# Patient Record
Sex: MALE | Race: WHITE | Employment: OTHER | URBAN - METROPOLITAN AREA
[De-identification: names, ages, dates, MRNs, and addresses within clinical notes are randomized per-mention and may not be internally consistent; named-entity substitution may affect disease eponyms.]

---

## 2017-02-06 ENCOUNTER — OFFICE VISIT (OUTPATIENT)
Dept: PEDIATRICS | Facility: CLINIC | Age: 67
End: 2017-02-06
Payer: COMMERCIAL

## 2017-02-06 VITALS
HEART RATE: 59 BPM | OXYGEN SATURATION: 99 % | DIASTOLIC BLOOD PRESSURE: 64 MMHG | BODY MASS INDEX: 25.09 KG/M2 | TEMPERATURE: 97.5 F | HEIGHT: 71 IN | SYSTOLIC BLOOD PRESSURE: 116 MMHG | WEIGHT: 179.2 LBS

## 2017-02-06 DIAGNOSIS — Z00.00 ROUTINE GENERAL MEDICAL EXAMINATION AT A HEALTH CARE FACILITY: Primary | ICD-10-CM

## 2017-02-06 DIAGNOSIS — I83.93 VARICOSE VEINS OF BOTH LOWER EXTREMITIES: ICD-10-CM

## 2017-02-06 LAB
ANION GAP SERPL CALCULATED.3IONS-SCNC: 9 MMOL/L (ref 3–14)
BUN SERPL-MCNC: 17 MG/DL (ref 7–30)
CALCIUM SERPL-MCNC: 9 MG/DL (ref 8.5–10.1)
CHLORIDE SERPL-SCNC: 105 MMOL/L (ref 94–109)
CHOLEST SERPL-MCNC: 196 MG/DL
CO2 SERPL-SCNC: 26 MMOL/L (ref 20–32)
CREAT SERPL-MCNC: 0.89 MG/DL (ref 0.66–1.25)
GFR SERPL CREATININE-BSD FRML MDRD: 85 ML/MIN/1.7M2
GLUCOSE SERPL-MCNC: 89 MG/DL (ref 70–99)
HCV AB SERPL QL IA: NORMAL
HDLC SERPL-MCNC: 48 MG/DL
LDLC SERPL CALC-MCNC: 136 MG/DL
NONHDLC SERPL-MCNC: 148 MG/DL
POTASSIUM SERPL-SCNC: 4.2 MMOL/L (ref 3.4–5.3)
PSA SERPL-ACNC: 0.89 UG/L (ref 0–4)
SODIUM SERPL-SCNC: 140 MMOL/L (ref 133–144)
TRIGL SERPL-MCNC: 61 MG/DL

## 2017-02-06 PROCEDURE — G0103 PSA SCREENING: HCPCS | Performed by: INTERNAL MEDICINE

## 2017-02-06 PROCEDURE — 36415 COLL VENOUS BLD VENIPUNCTURE: CPT | Performed by: INTERNAL MEDICINE

## 2017-02-06 PROCEDURE — 86803 HEPATITIS C AB TEST: CPT | Performed by: INTERNAL MEDICINE

## 2017-02-06 PROCEDURE — 99397 PER PM REEVAL EST PAT 65+ YR: CPT | Performed by: INTERNAL MEDICINE

## 2017-02-06 PROCEDURE — 80061 LIPID PANEL: CPT | Performed by: INTERNAL MEDICINE

## 2017-02-06 PROCEDURE — 80048 BASIC METABOLIC PNL TOTAL CA: CPT | Performed by: INTERNAL MEDICINE

## 2017-02-06 NOTE — PATIENT INSTRUCTIONS
Preventive Health Recommendations:     Yearly exam:             See your health care provider every year in order to  o   Review health changes.   o   Discuss preventive care.      Every year, have a diabetes test (fasting glucose).    Every 1-2 years, have a cholesterol test.     Have a colonoscopy again in 2021.  Shots:     Get a flu shot each year.     Get a tetanus shot every 10 years.   Nutrition:     Eat at least 5 servings of fruits and vegetables each day.     Eat whole-grain bread, whole-wheat pasta and brown rice instead of white grains and rice.     Get adequate Calcium and Vitamin D.   Lifestyle    Exercise for at least 150 minutes a week (30 minutes a day, 5 days a week). This will help you control your weight and prevent disease.     Limit alcohol to one drink per day.     No smoking.     Wear sunscreen to prevent skin cancer.     See your dentist every six months for an exam and cleaning.     See your eye doctor every 1 to 2 years to screen for conditions such as glaucoma, macular degeneration and cataracts.

## 2017-02-06 NOTE — PROGRESS NOTES
SUBJECTIVE:                                                            Keith Kevin is a 66 year old male who presents for Preventive Visit.  Are you in the first 12 months of your Medicare Part B coverage?  No    Healthy Habits:    Do you get at least three servings of calcium containing foods daily (dairy, green leafy vegetables, etc.)? yes    Amount of exercise or daily activities, outside of work: 76 day(s) per week    Problems taking medications regularly No    Medication side effects: No    Have you had an eye exam in the past two years? yes    Do you see a dentist twice per year? yes    Do you have sleep apnea, excessive snoring or daytime drowsiness?no    COGNITIVE SCREEN  1) Repeat 3 items (Banana, Sunrise, Chair)    2) Clock draw: NORMAL  3) 3 item recall: Recalls 3 objects  Results: NORMAL clock, 3 items recalled: COGNITIVE IMPAIRMENT LESS LIKELY    Mini-CogTM Copyright [employer]. Licensed by the author for use in [facility]; reprinted with permission (kelton@[facility]). All rights reserved.      Hx of performance anxiety. Not much of an issue currently. Has used propranolol in the past prn. Does not need a refill today.    Varicose veins, bilateral LE. Starting to cause sx. Plans to f/u with Dr. Huynh.     All Histories reviewed and updated in [facility] as appropriate.  Social History   Substance Use Topics     Smoking status: Never Smoker      Smokeless tobacco: Never Used     Alcohol Use: 0.0 oz/week     0 Standard drinks or equivalent per week      Comment: 1-2 beers per week       The patient does not drink >3 drinks per day nor >7 drinks per week.    Today's PHQ-2 Score:   PHQ-2 ( 1999 Pfizer) 2/6/2017 2/1/2016   Q1: Little interest or pleasure in doing things 0 0   Q2: Feeling down, depressed or hopeless 0 0   PHQ-2 Score 0 0       Do you feel safe in your environment - Yes    Do you have a Health Care Directive?: Yes: Patient states has Advance Directive and will bring in a copy to  "clinic.    Current providers sharing in care for this patient include:   Patient Care Team:  Abram Villanueva MD as PCP - General      Hearing impairment: No - wears hearing aids & feels they are good    Ability to successfully perform activities of daily living: Yes, no assistance needed     Fall risk:  Fallen 2 or more times in the past year?: No  Any fall with injury in the past year?: No    Home safety:  throw rugs in the hallway      The following health maintenance items are reviewed in Epic and correct as of today:  Health Maintenance   Topic Date Due     HEPATITIS C SCREENING  07/21/1968     INFLUENZA VACCINE (SYSTEM ASSIGNED)  09/01/2016     FALL RISK ASSESSMENT  02/01/2017     ADVANCE DIRECTIVE PLANNING Q5 YRS (NO INBASKET)  02/17/2017     TETANUS Q10 YR  12/03/2017     LIPID MONITORING Q5 YEARS (NO INBASKET)  02/01/2021     COLONOSCOPY Q10 YR INBASKET MESSAGE  04/18/2021     PNEUMOCOCCAL  Completed     AORTIC ANEURYSM SCREENING (SYSTEM ASSIGNED)  Completed             ROS:  Constitutional, HEENT, cardiovascular, pulmonary, gi and gu systems are negative, except as otherwise noted.    Problem list, Medication list, Allergies, and Medical/Social/Surgical histories reviewed in [facility] and updated as appropriate.  Labs reviewed in EPIC  OBJECTIVE:                                                            /64 mmHg  Pulse 59  Temp(Src) 97.5  F (36.4  C) (Tympanic)  Ht 5' 10.5\" (1.791 m)  Wt 179 lb 3.2 oz (81.285 kg)  BMI 25.34 kg/m2  SpO2 99% Estimated body mass index is 25.34 kg/(m^2) as calculated from the following:    Height as of this encounter: 5' 10.5\" (1.791 m).    Weight as of this encounter: 179 lb 3.2 oz (81.285 kg).  EXAM:   GENERAL: healthy, alert and no distress  EYES: Eyes grossly normal to inspection, PERRL and conjunctivae and sclerae normal  HENT: ear canals and TM's normal, nose and mouth without ulcers or lesions  NECK: no adenopathy, no asymmetry, masses, or scars and thyroid " "normal to palpation  RESP: lungs clear to auscultation - no rales, rhonchi or wheezes  CV: regular rate and rhythm, normal S1 S2, no S3 or S4, no murmur, click or rub, no peripheral edema and peripheral pulses strong  ABDOMEN: soft, nontender, no hepatosplenomegaly, no masses and bowel sounds normal  MS: no gross musculoskeletal defects noted, no edema. Varicose veins bilateral LE.   SKIN: no suspicious lesions or rashes  NEURO: Normal strength and tone, mentation intact and speech normal  PSYCH: mentation appears normal, affect normal/bright    ASSESSMENT / PLAN:                                                                ICD-10-CM    1. Routine general medical examination at a health care facility Z00.00 Lipid Profile (Chol, Trig, HDL, LDL calc)     Basic metabolic panel     PSA, screen     Hepatitis C Screen Reflex to HCV RNA Quant and Genotype   2. Varicose veins of both lower extremities I83.93        End of Life Planning:  Patient currently has an advanced directive: Yes.  Practitioner is supportive of decision.    COUNSELING:  Reviewed preventive health counseling, as reflected in patient instructions        Estimated body mass index is 25.34 kg/(m^2) as calculated from the following:    Height as of this encounter: 5' 10.5\" (1.791 m).    Weight as of this encounter: 179 lb 3.2 oz (81.285 kg).     reports that he has never smoked. He has never used smokeless tobacco.      Appropriate preventive services were discussed with this patient, including applicable screening as appropriate for cardiovascular disease, diabetes, osteopenia/osteoporosis, and glaucoma.  As appropriate for age/gender, discussed screening for colorectal cancer, prostate cancer. Checklist reviewing preventive services available has been given to the patient.    Reviewed patients plan of care and provided an AVS. The Basic Care Plan (routine screening as documented in Health Maintenance) for Keith meets the Care Plan requirement. This " Care Plan has been established and reviewed with the Patient.    Counseling Resources:  ATP IV Guidelines  Pooled Cohorts Equation Calculator  Breast Cancer Risk Calculator  FRAX Risk Assessment  ICSI Preventive Guidelines  Dietary Guidelines for Americans, 2010  USDA's MyPlate  ASA Prophylaxis  Lung CA Screening    Abram Villanueva MD  [facility]

## 2017-02-06 NOTE — MR AVS SNAPSHOT
After Visit Summary   2/6/2017    Keith Kevin    MRN: [id number]           Patient Information     Date Of Birth          1950        Visit Information        Provider Department      2/6/2017 8:10 AM Abram Villanueva MD [facility]        Today's Diagnoses     Routine general medical examination at a health care facility    -  1     Varicose veins of both lower extremities         Acute reaction to stress           Care Instructions      Preventive Health Recommendations:     Yearly exam:             See your health care provider every year in order to  o   Review health changes.   o   Discuss preventive care.      Every year, have a diabetes test (fasting glucose).    Every 1-2 years, have a cholesterol test.     Have a colonoscopy again in 2021.  Shots:     Get a flu shot each year.     Get a tetanus shot every 10 years.   Nutrition:     Eat at least 5 servings of fruits and vegetables each day.     Eat whole-grain bread, whole-wheat pasta and brown rice instead of white grains and rice.     Get adequate Calcium and Vitamin D.   Lifestyle    Exercise for at least 150 minutes a week (30 minutes a day, 5 days a week). This will help you control your weight and prevent disease.     Limit alcohol to one drink per day.     No smoking.     Wear sunscreen to prevent skin cancer.     See your dentist every six months for an exam and cleaning.     See your eye doctor every 1 to 2 years to screen for conditions such as glaucoma, macular degeneration and cataracts.        Follow-ups after your visit        Who to contact     If you have questions or need follow up information about today's clinic visit or your schedule please contact [facility] directly at [phone].  Normal or non-critical lab and imaging results will be communicated to you by MyChart, letter or phone within 4 business days after the clinic has received the results. If you do not hear from us within 7 days,  "please contact the clinic through [employer] or phone. If you have a critical or abnormal lab result, we will notify you by phone as soon as possible.  Submit refill requests through [employer] or call your pharmacy and they will forward the refill request to us. Please allow 3 business days for your refill to be completed.          Additional Information About Your Visit        [employer] Information     [employer] gives you secure access to your electronic health record. If you see a primary care provider, you can also send messages to your care team and make appointments. If you have questions, please call your primary care clinic.  If you do not have a primary care provider, please call [phone] and they will assist you.        Care EveryWhere ID     This is your Care EveryWhere ID. This could be used by other organizations to access your [place] medical records  [id number]        Your Vitals Were     Pulse Temperature Height BMI (Body Mass Index) Pulse Oximetry       59 97.5  F (36.4  C) (Tympanic) 5' 10.5\" (1.791 m) 25.34 kg/m2 99%        Blood Pressure from Last 3 Encounters:   02/06/17 116/64   02/01/16 110/60   01/16/15 106/64    Weight from Last 3 Encounters:   02/06/17 179 lb 3.2 oz (81.285 kg)   02/01/16 180 lb (81.647 kg)   01/16/15 175 lb 12.8 oz (79.742 kg)              We Performed the Following     Basic metabolic panel     Lipid Profile (Chol, Trig, HDL, LDL calc)     PSA, screen        Primary Care Provider Office Phone # Fax #    Abram Villanueva [phone]       [facility] [address] [place] [address] [address] [place] [address]        Thank you!     Thank you for choosing [facility]  for your care. Our goal is always to provide you with excellent care. Hearing back from our patients is one way we can continue to improve our services. Please take a few minutes to complete the written survey that you may receive in the mail after your visit with us. Thank you!           "   Your Updated Medication List - Protect others around you: Learn how to safely use, store and throw away your medicines at www.disposemymeds.org.          This list is accurate as of: 2/6/17  8:43 AM.  Always use your most recent med list.                   Brand Name Dispense Instructions for use    aspirin 325 MG EC tablet      1 tab po QD (Once per day)       BL CALCIUM-MAGNESIUM-ZINC 333-133-5 MG Tabs   Generic drug:  Calcium-Magnesium-Zinc      Take  by mouth.       calcium carbonate 500 MG chewable tablet    TUMS    100 tablet    Take 1 tablet by mouth daily.       Fish Oil 1200 MG Caps      None Entered       glucosamine 500 MG Caps      1500 QD       hydrocortisone 2.5 % cream     30 g    Apply  topically 2 times daily as needed.       propranolol 10 MG tablet    INDERAL    30 tablet    Take 1 tablet (10 mg) by mouth 3 times daily as needed       vitamin D 1000 UNITS capsule     100 capsule    Take 1 capsule by mouth daily.

## 2017-02-06 NOTE — NURSING NOTE
"Chief Complaint   Patient presents with     Medicare Visit       Initial /64 mmHg  Pulse 59  Temp(Src) 97.5  F (36.4  C) (Tympanic)  Ht 5' 10.5\" (1.791 m)  Wt 179 lb 3.2 oz (81.285 kg)  BMI 25.34 kg/m2  SpO2 99% Estimated body mass index is 25.34 kg/(m^2) as calculated from the following:    Height as of this encounter: 5' 10.5\" (1.791 m).    Weight as of this encounter: 179 lb 3.2 oz (81.285 kg).  Medication Reconciliation: complete   Rachel Mcmahan, MARICEL      "

## 2017-03-06 ENCOUNTER — TRANSFERRED RECORDS (OUTPATIENT)
Dept: HEALTH INFORMATION MANAGEMENT | Facility: CLINIC | Age: 67
End: 2017-03-06

## 2017-03-06 ENCOUNTER — APPOINTMENT (OUTPATIENT)
Dept: VASCULAR SURGERY | Facility: CLINIC | Age: 67
End: 2017-03-06
Payer: COMMERCIAL

## 2017-03-06 PROCEDURE — 99213 OFFICE O/P EST LOW 20 MIN: CPT | Performed by: SURGERY

## 2017-06-14 ENCOUNTER — DOCUMENTATION ONLY (OUTPATIENT)
Dept: OTHER | Facility: CLINIC | Age: 67
End: 2017-06-14

## 2017-06-14 DIAGNOSIS — Z71.89 ACP (ADVANCE CARE PLANNING): Chronic | ICD-10-CM

## 2017-06-24 DIAGNOSIS — F43.0 ACUTE REACTION TO STRESS: ICD-10-CM

## 2017-06-26 NOTE — TELEPHONE ENCOUNTER
propranolol (INDERAL) 10 MG tablet      Last Written Prescription Date: 1/16/2015  Last Fill Quantity: 30, # refills: 11    Last Office Visit with [facility], [facility] or [facility] prescribing provider:  2/6/2017   Future Office Visit:        BP Readings from Last 3 Encounters:   02/06/17 116/64   02/01/16 110/60   01/16/15 106/64

## 2017-06-28 RX ORDER — PROPRANOLOL HYDROCHLORIDE 10 MG/1
TABLET ORAL
Qty: 30 TABLET | Refills: 11 | Status: SHIPPED | OUTPATIENT
Start: 2017-06-28 | End: 2019-02-12

## 2017-06-28 NOTE — TELEPHONE ENCOUNTER
Routing refill request to provider for review/approval because:  A break in medication, last refill 2015. Please sign if ok.  Uses for anxiety prn.     Keisha Abel, GENEVIEVE  Triage Nurse

## 2017-11-08 ENCOUNTER — TRANSFERRED RECORDS (OUTPATIENT)
Dept: HEALTH INFORMATION MANAGEMENT | Facility: CLINIC | Age: 67
End: 2017-11-08

## 2018-02-07 ENCOUNTER — OFFICE VISIT (OUTPATIENT)
Dept: PEDIATRICS | Facility: CLINIC | Age: 68
End: 2018-02-07
Payer: COMMERCIAL

## 2018-02-07 VITALS
WEIGHT: 180.2 LBS | SYSTOLIC BLOOD PRESSURE: 108 MMHG | OXYGEN SATURATION: 97 % | DIASTOLIC BLOOD PRESSURE: 60 MMHG | HEIGHT: 71 IN | TEMPERATURE: 97.6 F | BODY MASS INDEX: 25.23 KG/M2 | HEART RATE: 59 BPM

## 2018-02-07 DIAGNOSIS — Z12.5 SCREENING FOR PROSTATE CANCER: ICD-10-CM

## 2018-02-07 DIAGNOSIS — Z00.00 ROUTINE GENERAL MEDICAL EXAMINATION AT A HEALTH CARE FACILITY: Primary | ICD-10-CM

## 2018-02-07 LAB
CHOLEST SERPL-MCNC: 218 MG/DL
GLUCOSE SERPL-MCNC: 87 MG/DL (ref 70–99)
HDLC SERPL-MCNC: 67 MG/DL
LDLC SERPL CALC-MCNC: 135 MG/DL
NONHDLC SERPL-MCNC: 151 MG/DL
PSA SERPL-ACNC: 0.75 UG/L (ref 0–4)
TRIGL SERPL-MCNC: 80 MG/DL

## 2018-02-07 PROCEDURE — G0103 PSA SCREENING: HCPCS | Performed by: INTERNAL MEDICINE

## 2018-02-07 PROCEDURE — 36415 COLL VENOUS BLD VENIPUNCTURE: CPT | Performed by: INTERNAL MEDICINE

## 2018-02-07 PROCEDURE — 82947 ASSAY GLUCOSE BLOOD QUANT: CPT | Performed by: INTERNAL MEDICINE

## 2018-02-07 PROCEDURE — G0439 PPPS, SUBSEQ VISIT: HCPCS | Performed by: INTERNAL MEDICINE

## 2018-02-07 PROCEDURE — 80061 LIPID PANEL: CPT | Performed by: INTERNAL MEDICINE

## 2018-02-07 ASSESSMENT — ACTIVITIES OF DAILY LIVING (ADL)
CURRENT_FUNCTION: NO ASSISTANCE NEEDED
I_NEED_ASSISTANCE_FOR_THE_FOLLOWING_DAILY_ACTIVITIES:: NO ASSISTANCE IS NEEDED

## 2018-02-07 NOTE — NURSING NOTE
"Chief Complaint   Patient presents with     Physical       Initial /60  Pulse 59  Temp 97.6  F (36.4  C) (Oral)  Ht 5' 10.87\" (1.8 m)  Wt 180 lb 3.2 oz (81.7 kg)  SpO2 97%  BMI 25.23 kg/m2 Estimated body mass index is 25.23 kg/(m^2) as calculated from the following:    Height as of this encounter: 5' 10.87\" (1.8 m).    Weight as of this encounter: 180 lb 3.2 oz (81.7 kg).  Medication Reconciliation: complete   Sarita Castaneda MA// February 7, 2018 9:43 AM          "

## 2018-02-07 NOTE — PROGRESS NOTES
SUBJECTIVE:   Keith Kevin is a 67 year old male who presents for Preventive Visit.    Are you in the first 12 months of your Medicare coverage?  No    Physical   Annual:     Getting at least 3 servings of Calcium per day::  Yes    Bi-annual eye exam::  Yes    Dental care twice a year::  Yes    Sleep apnea or symptoms of sleep apnea::  None    Diet::  Regular (no restrictions)    Frequency of exercise::  6-7 days/week    Duration of exercise::  Greater than 60 minutes    Taking medications regularly::  Yes    Medication side effects::  Not applicable    Additional concerns today::  YES    Ability to successfully perform activities of daily living: no assistance needed  Home Safety:  Lack of grab bars in the bathroom  Hearing Impairment: difficulty following a conversation in a noisy restaurant or crowded room, feel that people are mumbling or not speaking clearly, difficulty following dialogue in the theater, difficult to understand a speaker at a public meeting or [employer] service, need to ask people to speak up or repeat themselves, find that men's voices are easier to understand than woman's, difficulty understanding soft or whispered speech and difficulty understanding speech on the telephone  - Has hearing aids already, they do help in these situations    Fall risk:  Fallen 2 or more times in the past year?: No  Any fall with injury in the past year?: No    COGNITIVE SCREEN  1) Repeat 3 items (Banana, Sunrise, Chair)    2) Clock draw: NORMAL  3) 3 item recall: Recalls 3 objects  Results: 3 items recalled: COGNITIVE IMPAIRMENT LESS LIKELY    Mini-CogTM Copyright [employer]. Licensed by the author for use in [facility]; reprinted with permission ([email or web address]). All rights reserved.      Has propranolol, uses prn. 30 last at least 1 year.    Reviewed and updated as needed this visit by Provider  Tobacco  Allergies  Meds  Problems  Med Hx  Surg Hx  Fam Hx  Soc Hx         Social History  "  Substance Use Topics     Smoking status: Never Smoker     Smokeless tobacco: Never Used     Alcohol use 0.0 oz/week     0 Standard drinks or equivalent per week      Comment: 1-2 beers per week       Alcohol Use 2/7/2018   If you drink alcohol, do you typically have greater than 3 drinks per day OR greater than 7 drinks per week?   No   No flowsheet data found.      Today's PHQ-2 Score:   PHQ-2 ( 1999 Pfizer) 2/7/2018   Q1: Little interest or pleasure in doing things 0   Q2: Feeling down, depressed or hopeless 0   PHQ-2 Score 0   Q1: Little interest or pleasure in doing things Not at all   Q2: Feeling down, depressed or hopeless Not at all   PHQ-2 Score 0       Do you feel safe in your environment - Yes    Do you have a Health Care Directive?: Yes: Advance Directive has been received and scanned.    Current providers sharing in care for this patient include:   Patient Care Team:  Abram Villanueva MD as PCP - General    The following health maintenance items are reviewed in Epic and correct as of today:  Health Maintenance   Topic Date Due     INFLUENZA VACCINE (SYSTEM ASSIGNED)  09/01/2017     TETANUS Q10 YR  12/03/2017     FALL RISK ASSESSMENT  02/06/2018     COLONOSCOPY Q10 YR  04/18/2021     LIPID MONITORING Q5 YEARS  02/06/2022     ADVANCE DIRECTIVE PLANNING Q5 YRS  06/14/2022     PNEUMOCOCCAL  Completed     AORTIC ANEURYSM SCREENING (SYSTEM ASSIGNED)  Completed     HEPATITIS C SCREENING  Completed     Labs reviewed in EPIC      Review of Systems  Constitutional, HEENT, cardiovascular, pulmonary, gi and gu systems are negative, except as otherwise noted.    OBJECTIVE:   /60  Pulse 59  Temp 97.6  F (36.4  C) (Oral)  Ht 5' 10.87\" (1.8 m)  Wt 180 lb 3.2 oz (81.7 kg)  SpO2 97%  BMI 25.23 kg/m2 Estimated body mass index is 25.23 kg/(m^2) as calculated from the following:    Height as of this encounter: 5' 10.87\" (1.8 m).    Weight as of this encounter: 180 lb 3.2 oz (81.7 kg).  Physical Exam  GENERAL: " "healthy, alert and no distress  EYES: Eyes grossly normal to inspection, PERRL and conjunctivae and sclerae normal  HENT: ear canals and TM's normal, nose and mouth without ulcers or lesions  NECK: no adenopathy, no asymmetry, masses, or scars and thyroid normal to palpation  RESP: lungs clear to auscultation - no rales, rhonchi or wheezes  CV: regular rate and rhythm, normal S1 S2, no S3 or S4, no murmur, click or rub, no peripheral edema and peripheral pulses strong  ABDOMEN: soft, nontender, no hepatosplenomegaly, no masses and bowel sounds normal  MS: no gross musculoskeletal defects noted, no edema  SKIN: no suspicious lesions or rashes  NEURO: Normal strength and tone, mentation intact and speech normal  PSYCH: mentation appears normal, affect normal/bright    ASSESSMENT / PLAN:       ICD-10-CM    1. Routine general medical examination at a health care facility Z00.00 Lipid Profile (Chol, Trig, HDL, LDL calc)     Glucose   2. Screening for prostate cancer Z12.5 PSA, screen       End of Life Planning:  Patient currently has an advanced directive: Yes.  Practitioner is supportive of decision.    COUNSELING:  Reviewed preventive health counseling, as reflected in patient instructions    Estimated body mass index is 25.35 kg/(m^2) as calculated from the following:    Height as of 2/6/17: 5' 10.5\" (1.791 m).    Weight as of 2/6/17: 179 lb 3.2 oz (81.3 kg).     reports that he has never smoked. He has never used smokeless tobacco.      Appropriate preventive services were discussed with this patient, including applicable screening as appropriate for cardiovascular disease, diabetes, osteopenia/osteoporosis, and glaucoma.  As appropriate for age/gender, discussed screening for colorectal cancer, prostate cancer, breast cancer, and cervical cancer. Checklist reviewing preventive services available has been given to the patient.    Reviewed patients plan of care and provided an AVS. The Basic Care Plan (routine " screening as documented in Health Maintenance) for Keith meets the Care Plan requirement. This Care Plan has been established and reviewed with the Patient.    Counseling Resources:  ATP IV Guidelines  Pooled Cohorts Equation Calculator  Breast Cancer Risk Calculator  FRAX Risk Assessment  ICSI Preventive Guidelines  Dietary Guidelines for Americans, 2010  USDA's MyPlate  ASA Prophylaxis  Lung CA Screening    Abram Villanueva MD  [facility]

## 2018-02-07 NOTE — PATIENT INSTRUCTIONS
Preventive Health Recommendations:       Yearly exam:             See your health care provider every year in order to  o   Review health changes.   o   Discuss preventive care.      o   Review your medicines .    Every 1-2 years, have a diabetes test (fasting glucose)..    Every 1-2 years, have a cholesterol test.    Have a colonoscopy again in 2021.    Shots:     Get a flu shot each year.     Get a tetanus shot every 10 years.     Talk to your pharmacist about a shingles vaccine.   Nutrition:     Eat at least 5 servings of fruits and vegetables each day.     Eat whole-grain bread, whole-wheat pasta and brown rice instead of white grains and rice.     Get adequate Calcium and Vitamin D.   Lifestyle    Exercise for at least 150 minutes a week (30 minutes a day, 5 days a week). This will help you control your weight and prevent disease.     Limit alcohol to one drink per day.     No smoking.     Wear sunscreen to prevent skin cancer.     See your dentist every six months for an exam and cleaning.     See your eye doctor every 1 to 2 years to screen for conditions such as glaucoma, macular degeneration, cataracts, etc

## 2018-02-07 NOTE — MR AVS SNAPSHOT
After Visit Summary   2/7/2018    Keith eKvin    MRN: [id number]           Patient Information     Date Of Birth          1950        Visit Information        Provider Department      2/7/2018 9:20 AM Abram Villanueva MD [facility]        Today's Diagnoses     Routine general medical examination at a health care facility    -  1    Screening for prostate cancer          Care Instructions      Preventive Health Recommendations:       Yearly exam:             See your health care provider every year in order to  o   Review health changes.   o   Discuss preventive care.      o   Review your medicines .    Every 1-2 years, have a diabetes test (fasting glucose)..    Every 1-2 years, have a cholesterol test.    Have a colonoscopy again in 2021.    Shots:     Get a flu shot each year.     Get a tetanus shot every 10 years.     Talk to your pharmacist about a shingles vaccine.   Nutrition:     Eat at least 5 servings of fruits and vegetables each day.     Eat whole-grain bread, whole-wheat pasta and brown rice instead of white grains and rice.     Get adequate Calcium and Vitamin D.   Lifestyle    Exercise for at least 150 minutes a week (30 minutes a day, 5 days a week). This will help you control your weight and prevent disease.     Limit alcohol to one drink per day.     No smoking.     Wear sunscreen to prevent skin cancer.     See your dentist every six months for an exam and cleaning.     See your eye doctor every 1 to 2 years to screen for conditions such as glaucoma, macular degeneration, cataracts, etc           Follow-ups after your visit        Who to contact     If you have questions or need follow up information about today's clinic visit or your schedule please contact [facility] directly at [phone].  Normal or non-critical lab and imaging results will be communicated to you by MyChart, letter or phone within 4 business days after the clinic has received the  "results. If you do not hear from us within 7 days, please contact the clinic through [employer] or phone. If you have a critical or abnormal lab result, we will notify you by phone as soon as possible.  Submit refill requests through [employer] or call your pharmacy and they will forward the refill request to us. Please allow 3 business days for your refill to be completed.          Additional Information About Your Visit        [employer] Information     [employer] gives you secure access to your electronic health record. If you see a primary care provider, you can also send messages to your care team and make appointments. If you have questions, please call your primary care clinic.  If you do not have a primary care provider, please call [phone] and they will assist you.        Care EveryWhere ID     This is your Care EveryWhere ID. This could be used by other organizations to access your [place] medical records  [id number]        Your Vitals Were     Pulse Temperature Height Pulse Oximetry BMI (Body Mass Index)       59 97.6  F (36.4  C) (Oral) 5' 10.87\" (1.8 m) 97% 25.23 kg/m2        Blood Pressure from Last 3 Encounters:   02/07/18 108/60   02/06/17 116/64   02/01/16 110/60    Weight from Last 3 Encounters:   02/07/18 180 lb 3.2 oz (81.7 kg)   02/06/17 179 lb 3.2 oz (81.3 kg)   02/01/16 180 lb (81.6 kg)              We Performed the Following     Glucose     Lipid Profile (Chol, Trig, HDL, LDL calc)     PSA, screen        Primary Care Provider Office Phone # Fax #    Abram Villanueva [phone] [address] [place] [address]        Equal Access to Services     [facility] AH: Hadii abbey Sanchez, wafaizada luqadaha, qaybta mindaaleliz dominique. So [facility] [phone].    ATENCIÓN: Si habla español, tiene a ochoa disposición servicios gratuitos de asistencia lingüística. Llame al [phone].    We comply with applicable federal civil " rights laws and [place] laws. We do not discriminate on the basis of race, color, national origin, age, disability, sex, sexual orientation, or gender identity.            Thank you!     Thank you for choosing [facility]  for your care. Our goal is always to provide you with excellent care. Hearing back from our patients is one way we can continue to improve our services. Please take a few minutes to complete the written survey that you may receive in the mail after your visit with us. Thank you!             Your Updated Medication List - Protect others around you: Learn how to safely use, store and throw away your medicines at www.disposemymeds.org.          This list is accurate as of 2/7/18 10:02 AM.  Always use your most recent med list.                   Brand Name Dispense Instructions for use Diagnosis    aspirin 325 MG EC tablet      1 tab po QD (Once per day)        BL CALCIUM-MAGNESIUM-ZINC 333-133-5 MG Tabs per tablet   Generic drug:  Calcium-Magnesium-Zinc      Take  by mouth.        calcium carbonate 500 MG chewable tablet    TUMS    100 tablet    Take 1 tablet by mouth daily.        fish Oil 1200 MG capsule      None Entered        glucosamine 500 MG Caps      1500 QD    Routine general medical examination at a health care facility       hydrocortisone 2.5 % cream     30 g    Apply  topically 2 times daily as needed.    Rash and other nonspecific skin eruption       propranolol 10 MG tablet    INDERAL    30 tablet    TAKE ONE TABLET BY MOUTH THREE TIMES A DAY AS NEEDED    Acute reaction to stress       vitamin D 1000 UNITS capsule     100 capsule    Take 1 capsule by mouth daily.

## 2019-02-12 ENCOUNTER — OFFICE VISIT (OUTPATIENT)
Dept: PEDIATRICS | Facility: CLINIC | Age: 69
End: 2019-02-12
Payer: COMMERCIAL

## 2019-02-12 VITALS
TEMPERATURE: 97.3 F | HEIGHT: 71 IN | BODY MASS INDEX: 25.2 KG/M2 | RESPIRATION RATE: 16 BRPM | HEART RATE: 60 BPM | WEIGHT: 180 LBS | DIASTOLIC BLOOD PRESSURE: 54 MMHG | OXYGEN SATURATION: 97 % | SYSTOLIC BLOOD PRESSURE: 112 MMHG

## 2019-02-12 DIAGNOSIS — Z12.5 SCREENING FOR PROSTATE CANCER: ICD-10-CM

## 2019-02-12 DIAGNOSIS — F43.0 ACUTE REACTION TO STRESS: ICD-10-CM

## 2019-02-12 DIAGNOSIS — Z00.00 ROUTINE GENERAL MEDICAL EXAMINATION AT A HEALTH CARE FACILITY: Primary | ICD-10-CM

## 2019-02-12 LAB
CHOLEST SERPL-MCNC: 203 MG/DL
GLUCOSE SERPL-MCNC: 96 MG/DL (ref 70–99)
HDLC SERPL-MCNC: 54 MG/DL
LDLC SERPL CALC-MCNC: 131 MG/DL
NONHDLC SERPL-MCNC: 149 MG/DL
PSA SERPL-ACNC: 0.83 UG/L (ref 0–4)
TRIGL SERPL-MCNC: 88 MG/DL

## 2019-02-12 PROCEDURE — G0103 PSA SCREENING: HCPCS | Performed by: INTERNAL MEDICINE

## 2019-02-12 PROCEDURE — 82947 ASSAY GLUCOSE BLOOD QUANT: CPT | Performed by: INTERNAL MEDICINE

## 2019-02-12 PROCEDURE — 36415 COLL VENOUS BLD VENIPUNCTURE: CPT | Performed by: INTERNAL MEDICINE

## 2019-02-12 PROCEDURE — G0439 PPPS, SUBSEQ VISIT: HCPCS | Performed by: INTERNAL MEDICINE

## 2019-02-12 PROCEDURE — 80061 LIPID PANEL: CPT | Performed by: INTERNAL MEDICINE

## 2019-02-12 RX ORDER — PROPRANOLOL HYDROCHLORIDE 10 MG/1
TABLET ORAL
Qty: 30 TABLET | Refills: 11 | Status: SHIPPED | OUTPATIENT
Start: 2019-02-12 | End: 2020-02-28

## 2019-02-12 ASSESSMENT — ENCOUNTER SYMPTOMS
SORE THROAT: 0
NAUSEA: 0
COUGH: 0
HEADACHES: 0
HEARTBURN: 0
MYALGIAS: 0
ABDOMINAL PAIN: 0
FREQUENCY: 0
SHORTNESS OF BREATH: 0
ARTHRALGIAS: 0
PARESTHESIAS: 0
CHILLS: 0
CONSTIPATION: 0
HEMATURIA: 0
HEMATOCHEZIA: 0
NERVOUS/ANXIOUS: 0
EYE PAIN: 0
FEVER: 0
WEAKNESS: 0
DIARRHEA: 0
PALPITATIONS: 0
JOINT SWELLING: 0
DIZZINESS: 0

## 2019-02-12 ASSESSMENT — ACTIVITIES OF DAILY LIVING (ADL): CURRENT_FUNCTION: NO ASSISTANCE NEEDED

## 2019-02-12 ASSESSMENT — MIFFLIN-ST. JEOR: SCORE: 1599.91

## 2019-02-12 NOTE — PATIENT INSTRUCTIONS
Preventive Health Recommendations:     See your health care provider every year to    Review health changes.     Discuss preventive care.      Review your medicines.      Every 1-2 years, have a diabetes test (fasting glucose).     At least every 5 years, have a cholesterol test.     Have a colonoscopy again in 2021.    Shots:     Get a flu shot each year.     Get a tetanus shot every 10 years.     Talk to your pharmacist about a shingles vaccine.   Nutrition:     Eat at least 5 servings of fruits and vegetables each day.     Eat whole-grain bread, whole-wheat pasta and brown rice instead of white grains and rice.     Get adequate Calcium and Vitamin D.   Lifestyle    Exercise for at least 150 minutes a week (30 minutes a day, 5 days a week). This will help you control your weight and prevent disease.     Limit alcohol to one drink per day.     No smoking.     Wear sunscreen to prevent skin cancer.    See your dentist every six months for an exam and cleaning.    See your eye doctor every 1 to 2 years to screen for conditions such as glaucoma, macular degeneration, cataracts, etc.    Personalized Prevention Plan  You are due for the preventive services outlined below.  Your care team is available to assist you in scheduling these services.  If you have already completed any of these items, please share that information with your care team to update in your medical record.  Health Maintenance Due   Topic Date Due     Zoster (Shingles) Vaccine (1 of 2) 07/21/1999     FALL RISK ASSESSMENT  02/07/2019     Depression Assessment 2 - yearly  02/07/2019

## 2019-04-29 ENCOUNTER — MYC MEDICAL ADVICE (OUTPATIENT)
Dept: PEDIATRICS | Facility: CLINIC | Age: 69
End: 2019-04-29

## 2019-04-29 ENCOUNTER — TELEPHONE (OUTPATIENT)
Dept: PEDIATRICS | Facility: CLINIC | Age: 69
End: 2019-04-29

## 2019-04-29 DIAGNOSIS — M79.644 PAIN OF FINGER OF RIGHT HAND: Primary | ICD-10-CM

## 2019-04-29 NOTE — TELEPHONE ENCOUNTER
LOV was on 2/12/19. No notes in chart regarding R hand pain/injury.     So, called pt back to get details. He has pain in his R middle finger off & on for couple of years. Planted 100 plats in one day couple of years back which started the pain. Lately he is waking up with pain in that middle finger lasting all day long. Denies swelling in joint, redness, warm to touch, weakness or trouble bending finger. Denies any other injury/work comp.     Requesting us to place a referral to [facility] in [place], there is a head therapist who is specialist in working with musicians' hand therapy.    Placed the referral in place. Advised pt to check with insurance for coverage details. Pt agrees.    Caden, RN  Triage Nurse

## 2019-04-29 NOTE — TELEPHONE ENCOUNTER
Reason for Call: Request for an order or referral:    Order or referral being requested: Hand Therapy - right    Date needed: as soon as possible    Has the patient been seen by the PCP for this problem? YES    Additional comments: Please add referral for hand therapy or call to schedule apt if required    Phone number Patient can be reached at:  Cell number on file:    Telephone Information:   Mobile [phone]       Best Time:      Can we leave a detailed message on this number?  YES    Call taken on 4/29/2019 at 1:00 PM by Olive Gonzalez

## 2019-05-01 ENCOUNTER — THERAPY VISIT (OUTPATIENT)
Dept: OCCUPATIONAL THERAPY | Facility: CLINIC | Age: 69
End: 2019-05-01
Payer: COMMERCIAL

## 2019-05-01 DIAGNOSIS — M79.644 PAIN OF FINGER OF RIGHT HAND: ICD-10-CM

## 2019-05-01 PROCEDURE — 97760 ORTHOTIC MGMT&TRAING 1ST ENC: CPT | Mod: GO | Performed by: OCCUPATIONAL THERAPIST

## 2019-05-01 PROCEDURE — 97110 THERAPEUTIC EXERCISES: CPT | Mod: GO | Performed by: OCCUPATIONAL THERAPIST

## 2019-05-01 PROCEDURE — 97165 OT EVAL LOW COMPLEX 30 MIN: CPT | Mod: GO | Performed by: OCCUPATIONAL THERAPIST

## 2019-05-01 NOTE — PROGRESS NOTES
Hand Therapy Initial Evaluation  Current Date:  5/1/2019    Subjective:  Keith Kevin is a 68 year old right hand dominant male.    Diagnosis:R long finger pain  DOI: July 2017 (MD order date 4/29/19)    Patient reports symptoms of pain of the right long finger which occurred due to digging up plants for landscapping. Since onset symptoms are gradually getting worse. Special tests:  none.  Previous treatment: motrin or advil PRN, exercises found online. General health as reported by patient is excellent.  Pertinent medical history includes: varicose veins.  Medical allergies: none.  Surgical history: none.  Medication history: None.    Occupational Profile Information:  Current occupation is retired pharmacist  Prior functional level:  no limitations  Barriers include:none  Mobility: No difficulty  Transportation: drives  Leisure activities/hobbies: plays oboe    Upper Extremity Functional Index Score:  SCORE:   Column Totals: /80: 71   (A lower score indicates greater disability.)    O:  Pain Level (Scale 0-10):   5/1/2019   At Rest 0   With Use 4     Pain Description:  Date 5/1/2019   Location Dorsal MP joint and P1 and radiates to DIP   Pain Quality Achy in morning, throbbing   Frequency intermittent     Pain is worst  in the morning   Exacerbated by  gripping and downward pressure, snapping fingers, a little pain with oboe playing   Relieved by None, comes and goes randomly, heat or cold helps   Progression Gradually worsening     Sensation:  WNL per pt report    Edema:  Circumference (measured in cm)  Long   Date 5/1/2019 5/1/2019   Side R L   P1 6.9 6.4   IP 6.6 6.2   P2 5.9 5.5     AROM of Fingers AROM (PROM): WNL, but pain present with full composite flexion    Special Tests:  Date 5/1/2019    Side R    Tenderness with palpation of MP of long finger 1-2/10 dorsally; non radially, ulnarly or volarly     Tenderness with palpation of P1 on long finger 0/10    Intrinsic tightness +    PROM MP flexion  Significant tightness felt with limitation of ~75% of full motion    Observation of flexion Slight ulnar shift of extensor tendon ulnarly over MP joint with full flexion      STRENGTH: (Measured in pounds, pain scale 0-10/10)    Date 5/1/2019        Trials Left Right Left Right Left Right Left Right Left Right Left Right   1 80 75             2               3               Avg               Pain                 3 Point Pinch  Date 5/1/2019        Trials Left Right Left Right Left Right Left Right Left Right Left Right   1 22 23             2               3               Avg               Pain                 Lateral Pinch  Date 5/1/2019        Trials Left Right Left Right Left Right Left Right Left Right Left Right   1 20 22             2               3               Avg               Pain                 Assessment/Plan:  Patient presents with symptoms consistent with diagnosis of right long finger pain, with conservative intervention.     Patient's limitations or Problem List includes:  Pain, Increased edema and Weakness of the right long finger which interferes with the patient's ability to perform Self Care Tasks (dressing), Recreational Activities and Household Chores as compared to previous level of function.    Rehab Potential:  Excellent - Return to full activity, no limitations    Patient will benefit from skilled Occupational Therapy to increase flexibility and overall strength and decrease pain and edema to return to previous activity level and resume normal daily tasks and to reach their rehab potential.    Barriers to Learning:  No barrier    Communication Issues:  Patient appears to be able to clearly communicate and understand verbal and written communication and follow directions correctly.    Assessment of Occupational Performance:  3-5 Performance Deficits  Identified Performance Deficits: dressing, home establishment and management and leisure activities      Clinical Decision Making  (Complexity): Low complexity    Treatment Explanation:  The following has been discussed with the patient:  RX ordered/plan of care  Anticipated outcomes  Possible risks and side effects    P: Frequency:  1 X week, once daily  Duration:  for 6 weeks    Treatment Plan:  Modalities:  US and Paraffin  Therapeutic Exercise:  AROM, AAROM, PROM, Tendon Gliding, Blocking, Reverse Blocking, Place and Hold, Extensor Tracking, Isotonics and Isometrics  Neuromuscular re-education:  Nerve Gliding, Coordination/Dexterity, Sensory re-education and Desensitization  Manual Techniques:  Coordination/Dexterity, Joint mobilization, Myofascial release and Manual edema mobilization  Orthotic Fabrication:  Static orthosis     Discharge Plan:  Achieve all LTG.  Independent in home treatment program.  Reach maximal therapeutic benefit.    Home Exercise Program:  Relative motion orthosis  Tendon glides  Passive Hook fist    Next Visit:  Check fit of orthosis  MFR  A/PROM

## 2019-05-06 PROBLEM — M79.644 PAIN OF FINGER OF RIGHT HAND: Status: ACTIVE | Noted: 2019-05-06

## 2019-05-13 ENCOUNTER — THERAPY VISIT (OUTPATIENT)
Dept: OCCUPATIONAL THERAPY | Facility: CLINIC | Age: 69
End: 2019-05-13
Payer: COMMERCIAL

## 2019-05-13 DIAGNOSIS — M79.644 PAIN OF FINGER OF RIGHT HAND: ICD-10-CM

## 2019-05-13 PROCEDURE — 97140 MANUAL THERAPY 1/> REGIONS: CPT | Mod: GO | Performed by: OCCUPATIONAL THERAPIST

## 2019-05-13 PROCEDURE — 97110 THERAPEUTIC EXERCISES: CPT | Mod: GO | Performed by: OCCUPATIONAL THERAPIST

## 2019-05-20 ENCOUNTER — THERAPY VISIT (OUTPATIENT)
Dept: OCCUPATIONAL THERAPY | Facility: CLINIC | Age: 69
End: 2019-05-20
Payer: COMMERCIAL

## 2019-05-20 DIAGNOSIS — M79.644 PAIN OF FINGER OF RIGHT HAND: ICD-10-CM

## 2019-05-20 PROCEDURE — 97140 MANUAL THERAPY 1/> REGIONS: CPT | Mod: GO | Performed by: OCCUPATIONAL THERAPIST

## 2019-05-20 PROCEDURE — 97110 THERAPEUTIC EXERCISES: CPT | Mod: GO | Performed by: OCCUPATIONAL THERAPIST

## 2019-05-28 ENCOUNTER — THERAPY VISIT (OUTPATIENT)
Dept: OCCUPATIONAL THERAPY | Facility: CLINIC | Age: 69
End: 2019-05-28
Payer: COMMERCIAL

## 2019-05-28 DIAGNOSIS — M79.644 PAIN OF FINGER OF RIGHT HAND: ICD-10-CM

## 2019-05-28 PROCEDURE — 97110 THERAPEUTIC EXERCISES: CPT | Mod: GO | Performed by: OCCUPATIONAL THERAPIST

## 2019-05-28 PROCEDURE — 97140 MANUAL THERAPY 1/> REGIONS: CPT | Mod: GO | Performed by: OCCUPATIONAL THERAPIST

## 2019-05-28 NOTE — PROGRESS NOTES
Hand Therapy Progress Note  Current Date:  5/28/2019  Reporting Period: 5/1/19 to 5/28/2019    Subjective:  Keith Kevin is a 68 year old right hand dominant male.    Diagnosis:R long finger pain  DOI: July 2017 (MD order date 4/29/19)    S:  Subjective changes as noted by patient: It's really siff and sore in the morning--wearing the brace vs not wearing it doesn't make a difference.  There are times where it gets really sore during the day.  I can tell I have better ROM.    Functional changes noted by patient: able to do most things     Response to previous treatment:  good  Patient has noted adverse reaction to:   None      O:  Pain Level (Scale 0-10):   5/1/2019 5/28/19   At Rest 0 0   With Use 4 4     Pain Description:  Date 5/1/2019   Location Dorsal MP joint and P1 and radiates to DIP   Pain Quality Achy in morning, throbbing   Frequency intermittent     Pain is worst  in the morning   Exacerbated by  gripping and downward pressure, snapping fingers, a little pain with oboe playing   Relieved by None, comes and goes randomly, heat or cold helps   Progression Gradually worsening     Sensation:  WNL per pt report    Edema:  Circumference (measured in cm)  Long   Date 5/1/2019 5/1/2019 5/28/19   Side R L R   P1 6.9 6.4 6.7   IP 6.6 6.2 6.5   P2 5.9 5.5 5.9     AROM of Fingers AROM (PROM): WNL, but pain present with full composite flexion    Special Tests:  Date 5/1/2019 5/28/19   Side R R   Tenderness with palpation of MP of long finger 1-2/10 dorsally; non radially, ulnarly or volarly  1/10 dorsally   Tenderness with palpation of P1 on long finger 0/10 NT   Intrinsic tightness + +, but lessened   PROM MP flexion Significant tightness felt with limitation of ~75% of full motion WNL   Observation of flexion Slight ulnar shift of extensor tendon ulnarly over MP joint with full flexion WNL     STRENGTH: (Measured in pounds, pain scale 0-10/10)    Date 5/1/2019 5/28/19       Trials Left Right Left Right Left  Right Left Right Left Right Left Right   1 80 75 68 61           2   74 71           3               Avg               Pain                 3 Point Pinch  Date 5/1/2019 5/28/19       Trials Left Right Left Right Left Right Left Right Left Right Left Right   1 22 23 22 21           2               3               Avg               Pain                 Lateral Pinch  Date 5/1/2019        Trials Left Right Left Right Left Right Left Right Left Right Left Right   1 20 22             2               3               Avg               Pain                 Assessment:  Response to therapy has been improvement to:  Flexibility:  tendon gliding is improved, improved excursion of involved muscles and less tightness in involved muscles  Strength:   and pinch  Edema:  edema is more mobile  Pain:  frequency is less, intensity of pain is decreased, duration of pain is decreased and less tender over affected area    Overall Assessment:  Patient's symptoms are resolving.  Patient is ready to progress to more complex exercises.  Patient is ready to progress to next level of protocol.  Patient is becoming more independent in home exercise program  Patient would benefit from continued therapy to achieve rehab potential  STG/LTG:  STGoals have been reviewed;  see goal sheet for details and updates.  LTGoals have been reviewed;  see goal sheet for details and updates.    I have re-evaluated this patient and find that the nature, scope, duration and intensity of the therapy is appropriate for the medical condition of the patient.    P: Frequency:  1 X every other week, once daily  Duration:  for 4 weeks    Treatment Plan:  Modalities:  US and Paraffin  Therapeutic Exercise:  AROM, AAROM, PROM, Tendon Gliding, Blocking, Reverse Blocking, Place and Hold, Extensor Tracking, Isotonics and Isometrics  Neuromuscular re-education:  Nerve Gliding, Coordination/Dexterity, Sensory re-education and Desensitization  Manual Techniques:   Coordination/Dexterity, Joint mobilization, Myofascial release and Manual edema mobilization  Orthotic Fabrication:  Static orthosis     Discharge Plan:  Achieve all LTG.  Independent in home treatment program.  Reach maximal therapeutic benefit.    Home Exercise Program:  Relative motion orthosis  Tendon glides  Passive Hook fist    Pinch    Next Visit:  Check fit of orthosis  MFR  A/PROM

## 2019-06-11 ENCOUNTER — THERAPY VISIT (OUTPATIENT)
Dept: OCCUPATIONAL THERAPY | Facility: CLINIC | Age: 69
End: 2019-06-11
Payer: COMMERCIAL

## 2019-06-11 DIAGNOSIS — M79.644 PAIN OF FINGER OF RIGHT HAND: ICD-10-CM

## 2019-06-11 PROCEDURE — 97140 MANUAL THERAPY 1/> REGIONS: CPT | Mod: GO | Performed by: OCCUPATIONAL THERAPIST

## 2019-06-11 PROCEDURE — 97110 THERAPEUTIC EXERCISES: CPT | Mod: GO | Performed by: OCCUPATIONAL THERAPIST

## 2019-07-02 ENCOUNTER — THERAPY VISIT (OUTPATIENT)
Dept: OCCUPATIONAL THERAPY | Facility: CLINIC | Age: 69
End: 2019-07-02
Payer: COMMERCIAL

## 2019-07-02 DIAGNOSIS — M79.644 PAIN OF FINGER OF RIGHT HAND: ICD-10-CM

## 2019-07-02 PROCEDURE — 97140 MANUAL THERAPY 1/> REGIONS: CPT | Mod: GO | Performed by: OCCUPATIONAL THERAPIST

## 2019-07-02 PROCEDURE — 97110 THERAPEUTIC EXERCISES: CPT | Mod: GO | Performed by: OCCUPATIONAL THERAPIST

## 2019-07-02 NOTE — PROGRESS NOTES
Hand Therapy Progress Note  Current Date:  7/2/2019  Reporting Period: 5/28/19 to 7/2/2019    Subjective:  Keith Kevin is a 68 year old right hand dominant male.    Diagnosis:R long finger pain  DOI: July 2017 (MD order date 4/29/19)    S:  Subjective changes as noted by patient: The top of my finger (P1) doesn't hurt anymore, but it does hurt here (just proximal to MP joint and just distal to PIP joint).  Functional changes noted by patient: able to do most things, able to play oboe.     Response to previous treatment:  good  Patient has noted adverse reaction to:   None      O:  Pain Level (Scale 0-10):   5/1/2019 5/28/19 7/2/19   At Rest 0 0 0   With Use 4 4 4-5     Pain Description:  Date 5/1/2019 7/2/19   Location Dorsal MP joint and P1 and radiates to DIP Just proximal to MP joint and just distal to PIP joint   Pain Quality Achy in morning, throbbing Achy, throbbing   Frequency intermittent   intermittent   Pain is worst  in the morning In the morning   Exacerbated by  Gripping and downward pressure, snapping fingers, a little pain with oboe playing Snapping fingers   Relieved by None, comes and goes randomly, heat or cold helps nothing   Progression Gradually worsening Gradually improving     Sensation:  WNL per pt report    Edema:  Circumference (measured in cm)  Long   Date 5/1/2019 5/1/2019 5/28/19 7/2/19   Side R L R R   P1 6.9 6.4 6.7 6.6   IP 6.6 6.2 6.5 6.5   P2 5.9 5.5 5.9 5.9     AROM of Fingers AROM (PROM): WNL, but pain present with full composite flexion    Special Tests:  Date 5/1/2019 5/28/19 7/2/19   Side R R R   Tenderness with palpation of MP of long finger 1-2/10 dorsally; non radially, ulnarly or volarly  1/10 dorsally 1/10   Tenderness with palpation of P1 on long finger 0/10 NT NT   Intrinsic tightness + +, but lessened +   PROM MP flexion Significant tightness felt with limitation of ~75% of full motion WNL NT   Observation of flexion Slight ulnar shift of extensor tendon ulnarly  over MP joint with full flexion WNL NT     STRENGTH: (Measured in pounds, pain scale 0-10/10)    Date 5/1/2019 5/28/19 7/2/19      Trials Left Right Left Right Left Right Left Right Left Right Left Right   1 80 75 68 61 79 72         2   74 71           3               Avg               Pain                 3 Point Pinch  Date 5/1/2019 5/28/19 7/2/19      Trials Left Right Left Right Left Right Left Right Left Right Left Right   1 22 23 22 21 24 20         2               3               Avg               Pain                 Lateral Pinch  Date 5/1/2019        Trials Left Right Left Right Left Right Left Right Left Right Left Right   1 20 22             2               3               Avg               Pain                 Assessment:  Response to therapy has been improvement to:  Flexibility:  tendon gliding is improved, improved excursion of involved muscles and less tightness in involved muscles  Strength:   and pinch  Edema:  edema is more mobile  Pain:  frequency is less, intensity of pain is decreased, duration of pain is decreased and less tender over affected area    Overall Assessment:  Patient's symptoms are resolving.  Patient is ready to progress to more complex exercises.  Patient is ready to progress to next level of protocol.  Patient is becoming more independent in home exercise program  Patient would benefit from continued therapy to achieve rehab potential  STG/LTG:  STGoals have been reviewed;  see goal sheet for details and updates.  LTGoals have been reviewed;  see goal sheet for details and updates.    I have re-evaluated this patient and find that the nature, scope, duration and intensity of the therapy is appropriate for the medical condition of the patient.    P: Frequency:  1 X every other week, once daily  Duration:  for 4 weeks    Treatment Plan:  Modalities:  US and Paraffin  Therapeutic Exercise:  AROM, AAROM, PROM, Tendon Gliding, Blocking, Reverse Blocking, Place and Hold,  Extensor Tracking, Isotonics and Isometrics  Neuromuscular re-education:  Nerve Gliding, Coordination/Dexterity, Sensory re-education and Desensitization  Manual Techniques:  Coordination/Dexterity, Joint mobilization, Myofascial release and Manual edema mobilization  Orthotic Fabrication:  Static orthosis     Discharge Plan:  Achieve all LTG.  Independent in home treatment program.  Reach maximal therapeutic benefit.    Home Exercise Program:  Relative motion orthosis  Tendon glides  Passive Hook fist    Pinch    Next Visit:  Check fit of orthosis  MFR  A/PROM

## 2019-07-16 ENCOUNTER — THERAPY VISIT (OUTPATIENT)
Dept: OCCUPATIONAL THERAPY | Facility: CLINIC | Age: 69
End: 2019-07-16
Payer: COMMERCIAL

## 2019-07-16 DIAGNOSIS — M79.644 PAIN OF FINGER OF RIGHT HAND: ICD-10-CM

## 2019-07-16 PROCEDURE — 97110 THERAPEUTIC EXERCISES: CPT | Mod: GO | Performed by: OCCUPATIONAL THERAPIST

## 2019-07-16 PROCEDURE — 97140 MANUAL THERAPY 1/> REGIONS: CPT | Mod: GO | Performed by: OCCUPATIONAL THERAPIST

## 2019-07-17 PROBLEM — M79.644 PAIN OF FINGER OF RIGHT HAND: Status: RESOLVED | Noted: 2019-05-06 | Resolved: 2019-07-17

## 2019-07-17 NOTE — PROGRESS NOTES
Hand Therapy Discharge Note  Current Date:  7/16/2019  Reporting Period: 7/2/19 to 7/16/2019    Subjective:  Keith Kevin is a 68 year old right hand dominant male.    Diagnosis:R long finger pain  DOI: July 2017 (MD order date 4/29/19)    S:  Subjective changes as noted by patient:  It is noticeably better than when I started.  I feel like I can manage it now.  This exercise (passive hook) I would like to review.  The pain is pretty much gone.    Functional changes noted by patient: able to do all daily tasks     Response to previous treatment:  good  Patient has noted adverse reaction to:   None      O:  All objective data taken at last visit, so did not re-assess  Pain Level (Scale 0-10):   5/1/2019 5/28/19 7/2/19 7/16/19   At Rest 0 0 0 0   With Use 4 4 4-5 0-1     Pain Description:  Date 5/1/2019 7/2/19   Location Dorsal MP joint and P1 and radiates to DIP Just proximal to MP joint and just distal to PIP joint   Pain Quality Achy in morning, throbbing Achy, throbbing   Frequency intermittent   intermittent   Pain is worst  in the morning In the morning   Exacerbated by  Gripping and downward pressure, snapping fingers, a little pain with oboe playing Snapping fingers   Relieved by None, comes and goes randomly, heat or cold helps nothing   Progression Gradually worsening Gradually improving     Sensation:  WNL per pt report    Edema:  Circumference (measured in cm)  Long   Date 5/1/2019 5/1/2019 5/28/19 7/2/19   Side R L R R   P1 6.9 6.4 6.7 6.6   IP 6.6 6.2 6.5 6.5   P2 5.9 5.5 5.9 5.9     AROM of Fingers AROM (PROM): WNL, but pain present with full composite flexion    Special Tests:  Date 5/1/2019 5/28/19 7/2/19   Side R R R   Tenderness with palpation of MP of long finger 1-2/10 dorsally; non radially, ulnarly or volarly  1/10 dorsally 1/10   Tenderness with palpation of P1 on long finger 0/10 NT NT   Intrinsic tightness + +, but lessened +   PROM MP flexion Significant tightness felt with limitation of  ~75% of full motion WNL NT   Observation of flexion Slight ulnar shift of extensor tendon ulnarly over MP joint with full flexion WNL NT     STRENGTH: (Measured in pounds, pain scale 0-10/10)    Date 5/1/2019 5/28/19 7/2/19      Trials Left Right Left Right Left Right Left Right Left Right Left Right   1 80 75 68 61 79 72         2   74 71           3               Avg               Pain                 3 Point Pinch  Date 5/1/2019 5/28/19 7/2/19      Trials Left Right Left Right Left Right Left Right Left Right Left Right   1 22 23 22 21 24 20         2               3               Avg               Pain                 Lateral Pinch  Date 5/1/2019        Trials Left Right Left Right Left Right Left Right Left Right Left Right   1 20 22             2               3               Avg               Pain                 A: Patient's symptoms are resolving.  Patient is progressing well.  Patient is independent in home exercise program.  Patient has met Short and Long Term Treatment Goals.  Patient is ready to be discharged from therapy and continue their home treatment program.    P:  Discharge from Hand Therapy; continue home program.  Patient to contact MD for tx orders should further issues arise.    Discharge Plan:  Achieve all LTG.  Independent in home treatment program.  Reach maximal therapeutic benefit.    Home Exercise Program:  Relative motion orthosis  Tendon glides  Passive Hook fist    Pinch

## 2020-02-27 ENCOUNTER — PRE VISIT (OUTPATIENT)
Dept: PEDIATRICS | Facility: CLINIC | Age: 70
End: 2020-02-27

## 2020-02-27 NOTE — TELEPHONE ENCOUNTER
Pre-Visit Planning     Future Appointments   Date Time Provider Department Center   2/28/2020 10:05 AM Abram Villanueva MD [facility]     Arrival Time for this Appointment:    Appointment Notes for this encounter:   Data Unavailable    Questionnaires Reviewed/Assigned  No additional questionnaires are needed        Patient preferred phone number: [phone]    Unable to reach. Left message with spouse confirming appointment.

## 2020-02-28 ENCOUNTER — OFFICE VISIT (OUTPATIENT)
Dept: PEDIATRICS | Facility: CLINIC | Age: 70
End: 2020-02-28
Payer: COMMERCIAL

## 2020-02-28 VITALS
BODY MASS INDEX: 24.92 KG/M2 | RESPIRATION RATE: 16 BRPM | WEIGHT: 178 LBS | HEART RATE: 59 BPM | OXYGEN SATURATION: 99 % | TEMPERATURE: 96.5 F | HEIGHT: 71 IN | DIASTOLIC BLOOD PRESSURE: 60 MMHG | SYSTOLIC BLOOD PRESSURE: 124 MMHG

## 2020-02-28 DIAGNOSIS — F43.0 ACUTE REACTION TO STRESS: ICD-10-CM

## 2020-02-28 DIAGNOSIS — I83.93 VARICOSE VEINS OF BOTH LOWER EXTREMITIES, UNSPECIFIED WHETHER COMPLICATED: ICD-10-CM

## 2020-02-28 DIAGNOSIS — Z00.00 ENCOUNTER FOR MEDICARE ANNUAL WELLNESS EXAM: Primary | ICD-10-CM

## 2020-02-28 DIAGNOSIS — Z12.5 SCREENING FOR PROSTATE CANCER: ICD-10-CM

## 2020-02-28 LAB
CHOLEST SERPL-MCNC: 229 MG/DL
GLUCOSE SERPL-MCNC: 86 MG/DL (ref 70–99)
HDLC SERPL-MCNC: 62 MG/DL
LDLC SERPL CALC-MCNC: 152 MG/DL
NONHDLC SERPL-MCNC: 167 MG/DL
PSA SERPL-ACNC: 1.04 UG/L (ref 0–4)
TRIGL SERPL-MCNC: 74 MG/DL

## 2020-02-28 PROCEDURE — 36415 COLL VENOUS BLD VENIPUNCTURE: CPT | Performed by: INTERNAL MEDICINE

## 2020-02-28 PROCEDURE — 82947 ASSAY GLUCOSE BLOOD QUANT: CPT | Performed by: INTERNAL MEDICINE

## 2020-02-28 PROCEDURE — 80061 LIPID PANEL: CPT | Performed by: INTERNAL MEDICINE

## 2020-02-28 PROCEDURE — 99397 PER PM REEVAL EST PAT 65+ YR: CPT | Performed by: INTERNAL MEDICINE

## 2020-02-28 PROCEDURE — G0103 PSA SCREENING: HCPCS | Performed by: INTERNAL MEDICINE

## 2020-02-28 RX ORDER — PROPRANOLOL HYDROCHLORIDE 10 MG/1
TABLET ORAL
Qty: 30 TABLET | Refills: 11 | Status: SHIPPED | OUTPATIENT
Start: 2020-02-28 | End: 2021-04-26

## 2020-02-28 SDOH — HEALTH STABILITY: MENTAL HEALTH: HOW OFTEN DO YOU HAVE A DRINK CONTAINING ALCOHOL?: MONTHLY OR LESS

## 2020-02-28 SDOH — HEALTH STABILITY: MENTAL HEALTH: HOW MANY STANDARD DRINKS CONTAINING ALCOHOL DO YOU HAVE ON A TYPICAL DAY?: 1 OR 2

## 2020-02-28 SDOH — HEALTH STABILITY: MENTAL HEALTH: HOW OFTEN DO YOU HAVE SIX OR MORE DRINKS ON ONE OCCASION?: NEVER

## 2020-02-28 SDOH — HEALTH STABILITY: MENTAL HEALTH: HOW MANY DRINKS CONTAINING ALCOHOL DO YOU HAVE ON A TYPICAL DAY WHEN YOU ARE DRINKING?: 1 OR 2

## 2020-02-28 SDOH — HEALTH STABILITY: MENTAL HEALTH: HOW OFTEN DO YOU HAVE 6 OR MORE DRINKS ON ONE OCCASION?: NEVER

## 2020-02-28 ASSESSMENT — ENCOUNTER SYMPTOMS
WEAKNESS: 0
ABDOMINAL PAIN: 0
PARESTHESIAS: 0
FREQUENCY: 0
FEVER: 0
EYE PAIN: 0
CONSTIPATION: 0
HEMATOCHEZIA: 0
HEADACHES: 0
DIARRHEA: 0
JOINT SWELLING: 0
HEARTBURN: 0
DIZZINESS: 0
ARTHRALGIAS: 0
PALPITATIONS: 0
NAUSEA: 0
HEMATURIA: 0
SHORTNESS OF BREATH: 0
COUGH: 0
SORE THROAT: 0
NERVOUS/ANXIOUS: 0
CHILLS: 0
DYSURIA: 0
MYALGIAS: 0

## 2020-02-28 ASSESSMENT — ACTIVITIES OF DAILY LIVING (ADL): CURRENT_FUNCTION: NO ASSISTANCE NEEDED

## 2020-02-28 ASSESSMENT — MIFFLIN-ST. JEOR: SCORE: 1589.53

## 2020-02-28 NOTE — PROGRESS NOTES
"SUBJECTIVE:   Keith Kevin is a 69 year old male who presents for Preventive Visit.    Are you in the first 12 months of your Medicare coverage?  No    Healthy Habits:     In general, how would you rate your overall health?  Excellent    Frequency of exercise:  6-7 days/week    Duration of exercise:  Greater than 60 minutes    Do you usually eat at least 4 servings of fruit and vegetables a day, include whole grains    & fiber and avoid regularly eating high fat or \"junk\" foods?  Yes    Taking medications regularly:  Yes    Medication side effects:  None    Ability to successfully perform activities of daily living:  No assistance needed    Home Safety:  Lack of grab bars in the bathroom    Hearing Impairment:  Difficulty following a conversation in a noisy restaurant or crowded room, feel that people are mumbling or not speaking clearly, difficulty following dialogue in the theater, difficult to understand a speaker at a public meeting or [employer] service, need to ask people to speak up or repeat themselves, find that men's voices are easier to understand than woman's, difficulty understanding soft or whispered speech and difficulty understanding speech on the telephone    In the past 6 months, have you been bothered by leaking of urine?  No    In general, how would you rate your overall mental or emotional health?  Excellent      PHQ-2 Total Score: 0    Additional concerns today:  No    Do you feel safe in your environment? Yes    Have you ever done Advance Care Planning? (For example, a Health Directive, POLST, or a discussion with a medical provider or your loved ones about your wishes): Yes, advance care planning is on file.      Fall risk  Fallen 2 or more times in the past year?: No  Any fall with injury in the past year?: No    Cognitive Screening   1) Repeat 3 items (Leader, Season, Table)    2) Clock draw: NORMAL  3) 3 item recall: Recalls 3 objects  Results: 3 items recalled: COGNITIVE IMPAIRMENT " LESS LIKELY    Mini-CogTM Copyright FAUSTINO Read. Licensed by the author for use in [facility]; reprinted with permission (kelton@.[facility]). All rights reserved.      Do you have sleep apnea, excessive snoring or daytime drowsiness?: no    Reviewed and updated as needed this visit by Provider  Tobacco  Allergies  Meds  Problems  Med Hx  Surg Hx  Fam Hx        Social History     Tobacco Use     Smoking status: Never Smoker     Smokeless tobacco: Never Used   Substance Use Topics     Alcohol use: Yes     Alcohol/week: 0.0 standard drinks     Frequency: Monthly or less     Drinks per session: 1 or 2     Binge frequency: Never     If you drink alcohol do you typically have >3 drinks per day or >7 drinks per week? No    Alcohol Use 2/28/2020   Prescreen: >3 drinks/day or >7 drinks/week? No   Prescreen: >3 drinks/day or >7 drinks/week? -       Current providers sharing in care for this patient include:   Patient Care Team:  Abram Villanueva MD as PCP - General  Abram Villanueva MD as Assigned PCP    The following health maintenance items are reviewed in Epic and correct as of today:  Health Maintenance   Topic Date Due     ANNUAL REVIEW OF HM ORDERS  1950     ZOSTER IMMUNIZATION (1 of 2) 07/21/1999     PHQ-2  01/01/2020     FALL RISK ASSESSMENT  02/12/2020     MEDICARE ANNUAL WELLNESS VISIT  02/12/2020     COLONOSCOPY  04/18/2021     ADVANCE CARE PLANNING  06/14/2022     LIPID  02/12/2024     DTAP/TDAP/TD IMMUNIZATION (3 - Td) 02/07/2028     HEPATITIS C SCREENING  Completed     INFLUENZA VACCINE  Completed     PNEUMOCOCCAL IMMUNIZATION 65+ LOW/MEDIUM RISK  Completed     AORTIC ANEURYSM SCREENING (SYSTEM ASSIGNED)  Completed     IPV IMMUNIZATION  Aged Out     MENINGITIS IMMUNIZATION  Aged Out       Review of Systems   Constitutional: Negative for chills and fever.   HENT: Negative for congestion, ear pain, hearing loss and sore throat.    Eyes: Negative for pain and visual disturbance.   Respiratory:  "Negative for cough and shortness of breath.    Cardiovascular: Negative for chest pain, palpitations and peripheral edema.   Gastrointestinal: Negative for abdominal pain, constipation, diarrhea, heartburn, hematochezia and nausea.   Genitourinary: Negative for discharge, dysuria, frequency, genital sores, hematuria, impotence and urgency.   Musculoskeletal: Negative for arthralgias, joint swelling and myalgias.   Skin: Negative for rash.   Neurological: Negative for dizziness, weakness, headaches and paresthesias.   Psychiatric/Behavioral: Negative for mood changes. The patient is not nervous/anxious.        OBJECTIVE:   /60 (BP Location: Right arm, Patient Position: Sitting, Cuff Size: Adult Regular)   Pulse 59   Temp 96.5  F (35.8  C) (Tympanic)   Resp 16   Ht 1.803 m (5' 11\")   Wt 80.7 kg (178 lb)   SpO2 99%   BMI 24.83 kg/m   Estimated body mass index is 24.83 kg/m  as calculated from the following:    Height as of this encounter: 1.803 m (5' 11\").    Weight as of this encounter: 80.7 kg (178 lb).  Physical Exam  GENERAL: healthy, alert and no distress  EYES: Eyes grossly normal to inspection, PERRL and conjunctivae and sclerae normal  HENT: ear canals and TM's normal, nose and mouth without ulcers or lesions  NECK: no adenopathy, no asymmetry, masses, or scars and thyroid normal to palpation  RESP: lungs clear to auscultation - no rales, rhonchi or wheezes  CV: regular rate and rhythm, normal S1 S2, no S3 or S4, no murmur, click or rub, no peripheral edema and peripheral pulses strong  ABDOMEN: soft, nontender, no hepatosplenomegaly, no masses and bowel sounds normal  MS: no gross musculoskeletal defects noted, no edema. Varicose veins car legs.   SKIN: no suspicious lesions or rashes  NEURO: Normal strength and tone, mentation intact and speech normal  PSYCH: mentation appears normal, affect normal/bright    ASSESSMENT / PLAN:       ICD-10-CM    1. Encounter for Medicare annual wellness exam " "Z00.00 Glucose     Lipid Profile   2. Acute reaction to stress F43.0 propranolol (INDERAL) 10 MG tablet    uses Propranolol prn   3. Screening for prostate cancer Z12.5 Prostate spec antigen screen   4. Varicose veins of both lower extremities, unspecified whether complicated I83.93        COUNSELING:  Reviewed preventive health counseling, as reflected in patient instructions    Estimated body mass index is 24.83 kg/m  as calculated from the following:    Height as of this encounter: 1.803 m (5' 11\").    Weight as of this encounter: 80.7 kg (178 lb).     reports that he has never smoked. He has never used smokeless tobacco.    Appropriate preventive services were discussed with this patient, including applicable screening as appropriate for cardiovascular disease, diabetes, osteopenia/osteoporosis, and glaucoma.  As appropriate for age/gender, discussed screening for colorectal cancer, prostate cancer. Checklist reviewing preventive services available has been given to the patient.    Reviewed patients plan of care and provided an AVS. The Basic Care Plan (routine screening as documented in Health Maintenance) for Keith meets the Care Plan requirement. This Care Plan has been established and reviewed with the Patient.    Counseling Resources:  ATP IV Guidelines  Pooled Cohorts Equation Calculator  Breast Cancer Risk Calculator  FRAX Risk Assessment  ICSI Preventive Guidelines  Dietary Guidelines for Americans, 2010  AAMPP's MyPlate  ASA Prophylaxis  Lung CA Screening    Abram Villanueva MD  [facility]    Identified Health Risks:  "

## 2020-02-28 NOTE — PATIENT INSTRUCTIONS
Patient Education   Personalized Prevention Plan  You are due for the preventive services outlined below.  Your care team is available to assist you in scheduling these services.  If you have already completed any of these items, please share that information with your care team to update in your medical record.  Health Maintenance Due   Topic Date Due     ANNUAL REVIEW OF HM ORDERS  1950     Zoster (Shingles) Vaccine (1 of 2) 07/21/1999     PHQ-2  01/01/2020     FALL RISK ASSESSMENT  02/12/2020     Annual Wellness Visit  02/12/2020

## 2020-07-27 ENCOUNTER — TELEPHONE (OUTPATIENT)
Dept: SURGERY | Facility: CLINIC | Age: 70
End: 2020-07-27

## 2020-07-27 ENCOUNTER — OFFICE VISIT (OUTPATIENT)
Dept: SURGERY | Facility: CLINIC | Age: 70
End: 2020-07-27
Payer: COMMERCIAL

## 2020-07-27 ENCOUNTER — PREP FOR PROCEDURE (OUTPATIENT)
Dept: SURGERY | Facility: CLINIC | Age: 70
End: 2020-07-27

## 2020-07-27 VITALS
HEIGHT: 71 IN | SYSTOLIC BLOOD PRESSURE: 118 MMHG | DIASTOLIC BLOOD PRESSURE: 64 MMHG | WEIGHT: 175 LBS | OXYGEN SATURATION: 98 % | RESPIRATION RATE: 16 BRPM | HEART RATE: 72 BPM | BODY MASS INDEX: 24.5 KG/M2

## 2020-07-27 DIAGNOSIS — Z11.59 ENCOUNTER FOR SCREENING FOR OTHER VIRAL DISEASES: Primary | ICD-10-CM

## 2020-07-27 DIAGNOSIS — K40.90 LEFT INGUINAL HERNIA: Primary | ICD-10-CM

## 2020-07-27 PROCEDURE — 99203 OFFICE O/P NEW LOW 30 MIN: CPT | Performed by: SURGERY

## 2020-07-27 RX ORDER — TAMSULOSIN HYDROCHLORIDE 0.4 MG/1
0.4 CAPSULE ORAL DAILY
Qty: 7 CAPSULE | Refills: 0 | Status: SHIPPED | OUTPATIENT
Start: 2020-07-27 | End: 2020-08-12

## 2020-07-27 ASSESSMENT — MIFFLIN-ST. JEOR: SCORE: 1570.92

## 2020-07-27 NOTE — PROGRESS NOTES
HPI:  Keith is a 70 year old male who presents for evaluation of left groin bulging.  The patient is known to me from a right inguinal hernia repair done in 2002.  Over the last several weeks, the patient has noticed bulging in the left groin.  He notices occasional twinges of discomfort on the right, but no obvious bulge.  The patient does have significant known varicose veins on the left.  He reports that he has had multiple procedures for that.  He also reports having had an appendectomy as a young adult through a paramedian incision.    Past Medical History:   has a past medical history of Varicose veins of lower extremities with inflammation.    Past Surgical History:  Past Surgical History:   Procedure Laterality Date     APPENDECTOMY  Age 20     C LAP,HERNIA REPAIR PROC,UNLIST  2002     C NONSPECIFIC PROCEDURE  2001    vein stripping     LIGATE VEIN      Varicose veins, several times     TONSILLECTOMY & ADENOIDECTOMY  Approx age 10        Social History:  Social History     Socioeconomic History     Marital status:      Spouse name: Mary     Number of children: 2     Years of education: Not on file     Highest education level: Not on file   Occupational History     Employer: RETIRED     Comment: Worked as a pharmacist   Social Needs     Financial resource strain: Not on file     Food insecurity     Worry: Not on file     Inability: Not on file     Transportation needs     Medical: Not on file     Non-medical: Not on file   Tobacco Use     Smoking status: Never Smoker     Smokeless tobacco: Never Used   Substance and Sexual Activity     Alcohol use: Yes     Alcohol/week: 0.0 standard drinks     Frequency: Monthly or less     Drinks per session: 1 or 2     Binge frequency: Never     Drug use: No     Sexual activity: Yes     Partners: Female   Lifestyle     Physical activity     Days per week: Not on file     Minutes per session: Not on file     Stress: Not on file   Relationships     Social  connections     Talks on phone: Not on file     Gets together: Not on file     Attends Hinduism service: Not on file     Active member of club or organization: Not on file     Attends meetings of clubs or organizations: Not on file     Relationship status: Not on file     Intimate partner violence     Fear of current or ex partner: Not on file     Emotionally abused: Not on file     Physically abused: Not on file     Forced sexual activity: Not on file   Other Topics Concern      Service Not Asked     Blood Transfusions Not Asked     Caffeine Concern Not Asked     Occupational Exposure Not Asked     Hobby Hazards Not Asked     Sleep Concern Not Asked     Stress Concern Not Asked     Weight Concern Not Asked     Special Diet Not Asked     Back Care No     Exercise Yes     Comment: daily walk and ride bike     Bike Helmet Yes     Seat Belt Yes     Self-Exams Not Asked     Parent/sibling w/ CABG, MI or angioplasty before 65F 55M? Not Asked   Social History Narrative     Not on file        Family History:  Family History   Problem Relation Age of Onset     Eye Disorder Mother         glaucoma     Cancer Sister         breast Ca, diagnosed age 49     Cardiovascular Father         Abdominal aortic aneurysm     C.A.D. No family hx of      Cancer - colorectal No family hx of      Prostate Cancer No family hx of          ROS:  The 10 point review of systems is negative other than noted in the HPI and above.    PE:    General- Well-developed, well-nourished, patient able to get up on table without difficulty.  HEENT- Normocephalic and atraumatic. Pupils equal and round.  Mucous membranes moist.  Sclera are nonicteric.  Neck- No lymphadenopathy or masses   Respirations- are regular and non labored  Abdomen is abdomen is soft without significant tenderness, masses, organomegaly or guarding  Hernia- Left inguinal hernia is present with valsalva.  This is easily reducible.              Right inguinal hernia is not  present with valsalva   Umbilical hernia is not present.              External genitalia are normal               Assessment: left inguinal hernia    Plan: I have recommended robotic assisted repair of the patient's left inguinal hernia with mesh.  We discussed the possibility that is previous surgery might require conversion to an open technique.  We will also take a look at the right side and be sure there is no obvious recurrence there.  We have discussed observation, reduction techniques and importance, incarceration and strangulation signs, symptoms and importance as well as need to seek emergency treatment.    We have discussed surgery in detail, including risk, benefits, complications, mesh, infection, nerve and cord damage and their sequelae including chronic pain and testicular loss, lifting and activity limits after surgery. He has been given literature to review. We will schedule surgery at patient's convenience.      Keshav Mehta MD    Please route or send letter to:  Primary Care Provider (PCP)

## 2020-07-27 NOTE — TELEPHONE ENCOUNTER
Type of surgery: ROBOTIC ASSISTED LEFT INGUINAL HERNIA REPAIR WITH MESH   Location of surgery: [facility]  Date and time of surgery: 8-18-20, 10 AM   Surgeon: DR. MATTHEWS   Pre-Op Appt Date: PATIENT TO SCHEDULE   Post-Op Appt Date: PATIENT TO SCHEDULE    Packet sent out: GIVEN TO PATIENT   Pre-cert/Authorization completed:  Not Applicable  Date: 7-27-20       ROBOTIC ASSISTED LEFT INGUINAL HERNIA REPAIR WITH MESH   GENERAL   PT INST TO HAVE H&P WITH DR. OMER  90 MINS REQ  PA ASSIST DFB   ALW

## 2020-07-28 ENCOUNTER — TELEPHONE (OUTPATIENT)
Dept: PEDIATRICS | Facility: CLINIC | Age: 70
End: 2020-07-28

## 2020-07-28 NOTE — TELEPHONE ENCOUNTER
Called and spoke with patient.  Patient is scheduled with Dr. Villanueva 08/12/20 at 1:00 pm.    Madyson Domingo

## 2020-07-28 NOTE — TELEPHONE ENCOUNTER
General Call:   Who is calling:  Harshil  Reason for Call:  appointment  What are your questions or concerns:  Pre op 08/18/20 Hernia  Date of last appointment with provider: selene  Okay to leave a detailed message:Yes at Cell number on file:    Telephone Information:   Mobile [phone]

## 2020-08-12 ENCOUNTER — OFFICE VISIT (OUTPATIENT)
Dept: PEDIATRICS | Facility: CLINIC | Age: 70
End: 2020-08-12
Payer: COMMERCIAL

## 2020-08-12 VITALS
OXYGEN SATURATION: 99 % | DIASTOLIC BLOOD PRESSURE: 52 MMHG | SYSTOLIC BLOOD PRESSURE: 120 MMHG | WEIGHT: 177 LBS | RESPIRATION RATE: 16 BRPM | HEART RATE: 69 BPM | TEMPERATURE: 97.4 F | BODY MASS INDEX: 24.78 KG/M2 | HEIGHT: 71 IN

## 2020-08-12 DIAGNOSIS — Z01.818 PREOP GENERAL PHYSICAL EXAM: Primary | ICD-10-CM

## 2020-08-12 DIAGNOSIS — K40.90 LEFT INGUINAL HERNIA: ICD-10-CM

## 2020-08-12 DIAGNOSIS — N52.9 ERECTILE DYSFUNCTION, UNSPECIFIED ERECTILE DYSFUNCTION TYPE: ICD-10-CM

## 2020-08-12 PROCEDURE — 93000 ELECTROCARDIOGRAM COMPLETE: CPT | Performed by: INTERNAL MEDICINE

## 2020-08-12 PROCEDURE — 99214 OFFICE O/P EST MOD 30 MIN: CPT | Performed by: INTERNAL MEDICINE

## 2020-08-12 RX ORDER — SILDENAFIL 100 MG/1
100 TABLET, FILM COATED ORAL DAILY PRN
Qty: 6 TABLET | Refills: 12 | Status: SHIPPED | OUTPATIENT
Start: 2020-08-12 | End: 2021-11-30

## 2020-08-12 ASSESSMENT — MIFFLIN-ST. JEOR: SCORE: 1576.6

## 2020-08-12 NOTE — PATIENT INSTRUCTIONS
Call your surgeon if there is any change in your health. This includes signs of a cold or flu (such as a sore throat, runny nose, cough, rash or fever).    Do not smoke, drink alcohol or take over the counter medicine (unless your surgeon or primary care doctor tells you to) for the 24 hours before and after surgery.    Stop all routine medications through the time of your surgery    Eating and drinking prior to surgery: follow the instructions from your surgeon

## 2020-08-12 NOTE — PROGRESS NOTES
[facility]  [address]  [address]  [place] [id number] [phone]    PRE-OP EVALUATION:  Today's date: 2020    Keith Kevin (: 1950) presents for pre-operative evaluation assessment as requested by Dr. Mehta.  He requires evaluation and anesthesia risk assessment prior to undergoing surgery/procedure for treatment of inguinal hernia.    Proposed Surgery/ Procedure: inguinal hernia repair   Date of Surgery/ Procedure: 2020  Time of Surgery/ Procedure: 10:00  Hospital/Surgical Facility: [facility]  Primary Physician: Abram Villanueva  Type of Anesthesia Anticipated: General    Preoperative Questionnaire:   No - Have you ever had a heart attack or stroke?  yes - Have you ever had surgery on your heart or blood vessels, such as a stent, coronary (heart) bypass, or surgery on an artery in the head, neck, heart, or legs?  No - Do you have chest pain when you are physically active?  No - Do you have a history of heart failure?  No - Do you currently have a cold, bronchitis, or symptoms of other respiratory (head and chest) infections?  No - Do you have a cough, shortness of breath, or wheezing?  No - Do you or anyone in your family have a history of blood clots?  No - Do you or anyone in your family have a serious bleeding problem, such as long-lasting bleeding after surgeries or cuts?  No - Have you ever had anemia or been told to take iron pills?  No - Have you had any abnormal blood loss such as black, tarry or bloody stools, or abnormal vaginal bleeding?  No - Have you ever had a blood transfusion?  Yes - Are you willing to have a blood transfusion if it is medically needed before, during, or after your surgery?  No - Have you or anyone in your family ever had problems with anesthesia (sedation for surgery)?  No - Do you have sleep apnea, excessive snoring, or daytime drowsiness?   No - Do you have any artifical heart valves or other implanted medical  devices, such as a pacemaker, defibrillator, or continuous glucose monitor?  No - Do you have any artifical joints?  No - Are you allergic to latex?  No - Is there any chance that you may be pregnant?    Patient has a Health Care Directive or Living Will:  YES     HPI:     HPI related to upcoming procedure: Left inguinal hernia. Plan for surgical closure.    ED. New dx. Interested in medications to help. Discussed options.     MEDICAL HISTORY:     Patient Active Problem List    Diagnosis Date Noted     Left inguinal hernia 07/27/2020     Acute reaction to stress 02/25/2013     uses propranolol prn       External hemorrhoids 02/09/2011     Varicose veins of both lower extremities 02/09/2011     Hyperlipidemia LDL goal <130 11/17/2008      Past Medical History:   Diagnosis Date     Varicose veins of lower extremities with inflammation      Past Surgical History:   Procedure Laterality Date     APPENDECTOMY  Age 20     C LAP,HERNIA REPAIR PROC,UNLIST  2002     C NONSPECIFIC PROCEDURE  2001    vein stripping     LIGATE VEIN      Varicose veins, several times     TONSILLECTOMY & ADENOIDECTOMY  Approx age 10     Current Outpatient Medications   Medication Sig Dispense Refill     ASPIRIN 325 MG OR TBEC 1 tab po QD (Once per day)       calcium carbonate (TUMS) 500 MG chewable tablet Take 1 tablet by mouth daily. 100 tablet 3     Calcium-Magnesium-Zinc (BL CALCIUM-MAGNESIUM-ZINC) 333-133-5 MG TABS Take by mouth daily        Cholecalciferol (VITAMIN D) 1000 UNIT capsule Take 1 capsule by mouth daily. 100 capsule 12     FISH OIL 1200 MG OR CAPS Take by mouth daily        GLUCOSAMINE 500 MG OR CAPS 1500 QD  0     propranolol (INDERAL) 10 MG tablet TAKE ONE TABLET BY MOUTH THREE TIMES A DAY AS NEEDED 30 tablet 11     tamsulosin (FLOMAX) 0.4 MG capsule Take 1 capsule (0.4 mg) by mouth daily for 7 days Begin 4 days before surgery, including morning of surgery. 7 capsule 0       Allergies   Allergen Reactions     No Known Drug  "Allergies       Latex Allergy: NO    Social History     Tobacco Use     Smoking status: Never Smoker     Smokeless tobacco: Never Used   Substance Use Topics     Alcohol use: Yes     Alcohol/week: 0.0 standard drinks     Frequency: Monthly or less     Drinks per session: 1 or 2     Binge frequency: Never     Comment: 1 drink per week     History   Drug Use No       REVIEW OF SYSTEMS:   Constitutional, neuro, ENT, endocrine, pulmonary, cardiac, gastrointestinal, genitourinary, musculoskeletal, integument and psychiatric systems are negative, except as otherwise noted.    EXAM:   /52 (BP Location: Right arm, Patient Position: Sitting, Cuff Size: Adult Regular)   Pulse 69   Temp 97.4  F (36.3  C) (Tympanic)   Resp 16   Ht 1.798 m (5' 10.79\")   Wt 80.3 kg (177 lb)   SpO2 99%   BMI 24.84 kg/m      GENERAL APPEARANCE: healthy, alert and no distress     EYES: EOMI,  PERRL     HENT: ear canals and TM's normal      NECK: no adenopathy, no asymmetry, masses, or scars and thyroid normal to palpation     RESP: lungs clear to auscultation - no rales, rhonchi or wheezes     CV: regular rates and rhythm, normal S1 S2, no S3 or S4 and no murmur, click or rub     ABDOMEN:  soft, nontender,bowel sounds normal     MS: extremities normal- no gross deformities noted, no evidence of inflammation in joints, FROM in all extremities.     SKIN: no suspicious lesions or rashes     NEURO: Normal strength and tone, sensory exam grossly normal, mentation intact and speech normal     PSYCH: mentation appears normal. and affect normal/bright     LYMPHATICS: No cervical adenopathy    DIAGNOSTICS:   EKG: Normal Sinus Rhythm, normal axis, normal intervals, no acute ST/T changes c/w ischemia, no LVH by voltage criteria, unchanged from previous tracings    Recent Labs   Lab Test 02/06/17  0845 02/01/16  0853    142   POTASSIUM 4.2 4.1   CR 0.89 0.83        IMPRESSION:   Reason for surgery/procedure: Left inguinal hernia     The " proposed surgical procedure is considered LOW risk.    REVISED CARDIAC RISK INDEX  The patient has the following serious cardiovascular risks for perioperative complications such as (MI, PE, VFib and 3  AV Block):  No serious cardiac risks      ICD-10-CM    1. Preop general physical exam  Z01.818 EKG 12-lead complete w/read - Clinics   2. Left inguinal hernia  K40.90 EKG 12-lead complete w/read - Clinics   3. Erectile dysfunction, unspecified erectile dysfunction type  N52.9 sildenafil (VIAGRA) 100 MG tablet       RECOMMENDATIONS:     APPROVAL GIVEN to proceed with proposed procedure, without further diagnostic evaluation       Signed Electronically by: Abram Villanueva MD

## 2020-08-15 DIAGNOSIS — Z11.59 ENCOUNTER FOR SCREENING FOR OTHER VIRAL DISEASES: ICD-10-CM

## 2020-08-15 LAB
SARS-COV-2 RNA SPEC QL NAA+PROBE: NORMAL
SPECIMEN SOURCE: NORMAL

## 2020-08-15 PROCEDURE — U0003 INFECTIOUS AGENT DETECTION BY NUCLEIC ACID (DNA OR RNA); SEVERE ACUTE RESPIRATORY SYNDROME CORONAVIRUS 2 (SARS-COV-2) (CORONAVIRUS DISEASE [COVID-19]), AMPLIFIED PROBE TECHNIQUE, MAKING USE OF HIGH THROUGHPUT TECHNOLOGIES AS DESCRIBED BY CMS-2020-01-R: HCPCS | Performed by: FAMILY MEDICINE

## 2020-08-16 LAB
LABORATORY COMMENT REPORT: NORMAL
SARS-COV-2 RNA SPEC QL NAA+PROBE: NEGATIVE
SPECIMEN SOURCE: NORMAL

## 2020-08-18 ENCOUNTER — ANESTHESIA EVENT (OUTPATIENT)
Dept: SURGERY | Facility: CLINIC | Age: 70
End: 2020-08-18
Payer: COMMERCIAL

## 2020-08-18 ENCOUNTER — APPOINTMENT (OUTPATIENT)
Dept: SURGERY | Facility: PHYSICIAN GROUP | Age: 70
End: 2020-08-18
Payer: COMMERCIAL

## 2020-08-18 ENCOUNTER — HOSPITAL ENCOUNTER (OUTPATIENT)
Facility: CLINIC | Age: 70
Discharge: HOME | End: 2020-08-18
Attending: SURGERY | Admitting: SURGERY
Payer: COMMERCIAL

## 2020-08-18 ENCOUNTER — SURGERY (OUTPATIENT)
Age: 70
End: 2020-08-18
Payer: COMMERCIAL

## 2020-08-18 ENCOUNTER — ANESTHESIA (OUTPATIENT)
Dept: SURGERY | Facility: CLINIC | Age: 70
End: 2020-08-18
Payer: COMMERCIAL

## 2020-08-18 VITALS
TEMPERATURE: 97.2 F | SYSTOLIC BLOOD PRESSURE: 163 MMHG | HEART RATE: 76 BPM | WEIGHT: 174.8 LBS | HEIGHT: 71 IN | BODY MASS INDEX: 24.47 KG/M2 | DIASTOLIC BLOOD PRESSURE: 77 MMHG | RESPIRATION RATE: 16 BRPM | OXYGEN SATURATION: 100 %

## 2020-08-18 DIAGNOSIS — K40.90 LEFT INGUINAL HERNIA: ICD-10-CM

## 2020-08-18 PROCEDURE — C1781 MESH (IMPLANTABLE): HCPCS | Performed by: SURGERY

## 2020-08-18 PROCEDURE — 25000128 H RX IP 250 OP 636: Performed by: SURGERY

## 2020-08-18 PROCEDURE — 25000125 ZZHC RX 250: Performed by: SURGERY

## 2020-08-18 PROCEDURE — 37000008 ZZH ANESTHESIA TECHNICAL FEE, 1ST 30 MIN: Performed by: SURGERY

## 2020-08-18 PROCEDURE — S2900 ROBOTIC SURGICAL SYSTEM: HCPCS | Performed by: PHYSICIAN ASSISTANT

## 2020-08-18 PROCEDURE — 25000128 H RX IP 250 OP 636: Performed by: NURSE ANESTHETIST, CERTIFIED REGISTERED

## 2020-08-18 PROCEDURE — 36000087 ZZH SURGERY LEVEL 8 EA 15 ADDTL MIN: Performed by: SURGERY

## 2020-08-18 PROCEDURE — 37000009 ZZH ANESTHESIA TECHNICAL FEE, EACH ADDTL 15 MIN: Performed by: SURGERY

## 2020-08-18 PROCEDURE — 49650 LAP ING HERNIA REPAIR INIT: CPT | Mod: LT | Performed by: SURGERY

## 2020-08-18 PROCEDURE — 27210794 ZZH OR GENERAL SUPPLY STERILE: Performed by: SURGERY

## 2020-08-18 PROCEDURE — 40000306 ZZH STATISTIC PRE PROC ASSESS II: Performed by: SURGERY

## 2020-08-18 PROCEDURE — 36000085 ZZH SURGERY LEVEL 8 1ST 30 MIN: Performed by: SURGERY

## 2020-08-18 PROCEDURE — S2900 ROBOTIC SURGICAL SYSTEM: HCPCS | Performed by: SURGERY

## 2020-08-18 PROCEDURE — 71000012 ZZH RECOVERY PHASE 1 LEVEL 1 FIRST HR: Performed by: SURGERY

## 2020-08-18 PROCEDURE — 71000027 ZZH RECOVERY PHASE 2 EACH 15 MINS: Performed by: SURGERY

## 2020-08-18 PROCEDURE — 25000128 H RX IP 250 OP 636: Performed by: ANESTHESIOLOGY

## 2020-08-18 PROCEDURE — 25000132 ZZH RX MED GY IP 250 OP 250 PS 637: Performed by: SURGERY

## 2020-08-18 PROCEDURE — 25800030 ZZH RX IP 258 OP 636: Performed by: ANESTHESIOLOGY

## 2020-08-18 PROCEDURE — 71000013 ZZH RECOVERY PHASE 1 LEVEL 1 EA ADDTL HR: Performed by: SURGERY

## 2020-08-18 PROCEDURE — 25000125 ZZHC RX 250: Performed by: NURSE ANESTHETIST, CERTIFIED REGISTERED

## 2020-08-18 PROCEDURE — 49650 LAP ING HERNIA REPAIR INIT: CPT | Mod: LT | Performed by: PHYSICIAN ASSISTANT

## 2020-08-18 DEVICE — MESH PROGRIP LAPAROSCOPIC 5.9X3.9" PARIETEX SELF-FIX LPG1510: Type: IMPLANTABLE DEVICE | Site: ABDOMEN | Status: FUNCTIONAL

## 2020-08-18 RX ORDER — LABETALOL 20 MG/4 ML (5 MG/ML) INTRAVENOUS SYRINGE
10
Status: DISCONTINUED | OUTPATIENT
Start: 2020-08-18 | End: 2020-08-18 | Stop reason: HOSPADM

## 2020-08-18 RX ORDER — FENTANYL CITRATE 50 UG/ML
25-50 INJECTION, SOLUTION INTRAMUSCULAR; INTRAVENOUS
Status: DISCONTINUED | OUTPATIENT
Start: 2020-08-18 | End: 2020-08-18 | Stop reason: HOSPADM

## 2020-08-18 RX ORDER — OXYCODONE HYDROCHLORIDE 5 MG/1
5-10 TABLET ORAL EVERY 4 HOURS PRN
Qty: 12 TABLET | Refills: 0 | Status: SHIPPED | OUTPATIENT
Start: 2020-08-18 | End: 2021-08-03

## 2020-08-18 RX ORDER — OXYCODONE HYDROCHLORIDE 5 MG/1
5 TABLET ORAL
Status: COMPLETED | OUTPATIENT
Start: 2020-08-18 | End: 2020-08-18

## 2020-08-18 RX ORDER — SODIUM CHLORIDE, SODIUM LACTATE, POTASSIUM CHLORIDE, CALCIUM CHLORIDE 600; 310; 30; 20 MG/100ML; MG/100ML; MG/100ML; MG/100ML
INJECTION, SOLUTION INTRAVENOUS CONTINUOUS
Status: DISCONTINUED | OUTPATIENT
Start: 2020-08-18 | End: 2020-08-18 | Stop reason: HOSPADM

## 2020-08-18 RX ORDER — PROPOFOL 10 MG/ML
INJECTION, EMULSION INTRAVENOUS CONTINUOUS PRN
Status: DISCONTINUED | OUTPATIENT
Start: 2020-08-18 | End: 2020-08-18

## 2020-08-18 RX ORDER — MEPERIDINE HYDROCHLORIDE 25 MG/ML
12.5 INJECTION INTRAMUSCULAR; INTRAVENOUS; SUBCUTANEOUS
Status: DISCONTINUED | OUTPATIENT
Start: 2020-08-18 | End: 2020-08-18 | Stop reason: HOSPADM

## 2020-08-18 RX ORDER — HYDRALAZINE HYDROCHLORIDE 20 MG/ML
2.5-5 INJECTION INTRAMUSCULAR; INTRAVENOUS EVERY 10 MIN PRN
Status: DISCONTINUED | OUTPATIENT
Start: 2020-08-18 | End: 2020-08-18 | Stop reason: HOSPADM

## 2020-08-18 RX ORDER — ONDANSETRON 4 MG/1
4 TABLET, ORALLY DISINTEGRATING ORAL EVERY 30 MIN PRN
Status: DISCONTINUED | OUTPATIENT
Start: 2020-08-18 | End: 2020-08-18 | Stop reason: HOSPADM

## 2020-08-18 RX ORDER — BUPIVACAINE HYDROCHLORIDE AND EPINEPHRINE 5; 5 MG/ML; UG/ML
INJECTION, SOLUTION EPIDURAL; INTRACAUDAL; PERINEURAL PRN
Status: DISCONTINUED | OUTPATIENT
Start: 2020-08-18 | End: 2020-08-18 | Stop reason: HOSPADM

## 2020-08-18 RX ORDER — ONDANSETRON 2 MG/ML
INJECTION INTRAMUSCULAR; INTRAVENOUS PRN
Status: DISCONTINUED | OUTPATIENT
Start: 2020-08-18 | End: 2020-08-18

## 2020-08-18 RX ORDER — TAMSULOSIN HYDROCHLORIDE 0.4 MG/1
0.4 CAPSULE ORAL
Status: DISCONTINUED | OUTPATIENT
Start: 2020-08-18 | End: 2020-08-18 | Stop reason: HOSPADM

## 2020-08-18 RX ORDER — LIDOCAINE HYDROCHLORIDE 10 MG/ML
INJECTION, SOLUTION INFILTRATION; PERINEURAL PRN
Status: DISCONTINUED | OUTPATIENT
Start: 2020-08-18 | End: 2020-08-18

## 2020-08-18 RX ORDER — LABETALOL 20 MG/4 ML (5 MG/ML) INTRAVENOUS SYRINGE
PRN
Status: DISCONTINUED | OUTPATIENT
Start: 2020-08-18 | End: 2020-08-18

## 2020-08-18 RX ORDER — NEOSTIGMINE METHYLSULFATE 1 MG/ML
VIAL (ML) INJECTION PRN
Status: DISCONTINUED | OUTPATIENT
Start: 2020-08-18 | End: 2020-08-18

## 2020-08-18 RX ORDER — CEFAZOLIN SODIUM 2 G/100ML
2 INJECTION, SOLUTION INTRAVENOUS
Status: COMPLETED | OUTPATIENT
Start: 2020-08-18 | End: 2020-08-18

## 2020-08-18 RX ORDER — HYDROMORPHONE HYDROCHLORIDE 1 MG/ML
.3-.5 INJECTION, SOLUTION INTRAMUSCULAR; INTRAVENOUS; SUBCUTANEOUS EVERY 10 MIN PRN
Status: DISCONTINUED | OUTPATIENT
Start: 2020-08-18 | End: 2020-08-18 | Stop reason: HOSPADM

## 2020-08-18 RX ORDER — GLYCOPYRROLATE 0.2 MG/ML
INJECTION, SOLUTION INTRAMUSCULAR; INTRAVENOUS PRN
Status: DISCONTINUED | OUTPATIENT
Start: 2020-08-18 | End: 2020-08-18

## 2020-08-18 RX ORDER — NALOXONE HYDROCHLORIDE 0.4 MG/ML
.1-.4 INJECTION, SOLUTION INTRAMUSCULAR; INTRAVENOUS; SUBCUTANEOUS
Status: DISCONTINUED | OUTPATIENT
Start: 2020-08-18 | End: 2020-08-18 | Stop reason: HOSPADM

## 2020-08-18 RX ORDER — CEFAZOLIN SODIUM 1 G/3ML
1 INJECTION, POWDER, FOR SOLUTION INTRAMUSCULAR; INTRAVENOUS SEE ADMIN INSTRUCTIONS
Status: DISCONTINUED | OUTPATIENT
Start: 2020-08-18 | End: 2020-08-18 | Stop reason: HOSPADM

## 2020-08-18 RX ORDER — DIMENHYDRINATE 50 MG/ML
25 INJECTION, SOLUTION INTRAMUSCULAR; INTRAVENOUS
Status: DISCONTINUED | OUTPATIENT
Start: 2020-08-18 | End: 2020-08-18 | Stop reason: HOSPADM

## 2020-08-18 RX ORDER — PROPOFOL 10 MG/ML
INJECTION, EMULSION INTRAVENOUS PRN
Status: DISCONTINUED | OUTPATIENT
Start: 2020-08-18 | End: 2020-08-18

## 2020-08-18 RX ORDER — ONDANSETRON 2 MG/ML
4 INJECTION INTRAMUSCULAR; INTRAVENOUS EVERY 30 MIN PRN
Status: DISCONTINUED | OUTPATIENT
Start: 2020-08-18 | End: 2020-08-18 | Stop reason: HOSPADM

## 2020-08-18 RX ORDER — DEXAMETHASONE SODIUM PHOSPHATE 4 MG/ML
INJECTION, SOLUTION INTRA-ARTICULAR; INTRALESIONAL; INTRAMUSCULAR; INTRAVENOUS; SOFT TISSUE PRN
Status: DISCONTINUED | OUTPATIENT
Start: 2020-08-18 | End: 2020-08-18

## 2020-08-18 RX ORDER — FENTANYL CITRATE 50 UG/ML
INJECTION, SOLUTION INTRAMUSCULAR; INTRAVENOUS PRN
Status: DISCONTINUED | OUTPATIENT
Start: 2020-08-18 | End: 2020-08-18

## 2020-08-18 RX ORDER — LIDOCAINE 40 MG/G
CREAM TOPICAL
Status: DISCONTINUED | OUTPATIENT
Start: 2020-08-18 | End: 2020-08-18 | Stop reason: HOSPADM

## 2020-08-18 RX ADMIN — OXYCODONE HYDROCHLORIDE 5 MG: 5 TABLET ORAL at 12:52

## 2020-08-18 RX ADMIN — HYDROMORPHONE HYDROCHLORIDE 0.5 MG: 1 INJECTION, SOLUTION INTRAMUSCULAR; INTRAVENOUS; SUBCUTANEOUS at 10:52

## 2020-08-18 RX ADMIN — HYDROMORPHONE HYDROCHLORIDE 0.5 MG: 1 INJECTION, SOLUTION INTRAMUSCULAR; INTRAVENOUS; SUBCUTANEOUS at 10:42

## 2020-08-18 RX ADMIN — BUPIVACAINE HYDROCHLORIDE AND EPINEPHRINE BITARTRATE 15 ML: 5; .005 INJECTION, SOLUTION EPIDURAL; INTRACAUDAL; PERINEURAL at 11:34

## 2020-08-18 RX ADMIN — GLYCOPYRROLATE 0.4 MG: 0.2 INJECTION, SOLUTION INTRAMUSCULAR; INTRAVENOUS at 11:27

## 2020-08-18 RX ADMIN — PROPOFOL 75 MCG/KG/MIN: 10 INJECTION, EMULSION INTRAVENOUS at 10:30

## 2020-08-18 RX ADMIN — CEFAZOLIN SODIUM 2 G: 2 INJECTION, SOLUTION INTRAVENOUS at 10:33

## 2020-08-18 RX ADMIN — ROCURONIUM BROMIDE 35 MG: 10 INJECTION INTRAVENOUS at 10:26

## 2020-08-18 RX ADMIN — FENTANYL CITRATE 100 MCG: 50 INJECTION, SOLUTION INTRAMUSCULAR; INTRAVENOUS at 10:26

## 2020-08-18 RX ADMIN — GLYCOPYRROLATE 0.2 MG: 0.2 INJECTION, SOLUTION INTRAMUSCULAR; INTRAVENOUS at 10:26

## 2020-08-18 RX ADMIN — ONDANSETRON HYDROCHLORIDE 4 MG: 2 INJECTION, SOLUTION INTRAVENOUS at 10:26

## 2020-08-18 RX ADMIN — FENTANYL CITRATE 50 MCG: 50 INJECTION, SOLUTION INTRAMUSCULAR; INTRAVENOUS at 12:22

## 2020-08-18 RX ADMIN — ROCURONIUM BROMIDE 15 MG: 10 INJECTION INTRAVENOUS at 10:43

## 2020-08-18 RX ADMIN — DEXAMETHASONE SODIUM PHOSPHATE 4 MG: 4 INJECTION, SOLUTION INTRA-ARTICULAR; INTRALESIONAL; INTRAMUSCULAR; INTRAVENOUS; SOFT TISSUE at 10:26

## 2020-08-18 RX ADMIN — ONDANSETRON 4 MG: 2 INJECTION INTRAMUSCULAR; INTRAVENOUS at 12:23

## 2020-08-18 RX ADMIN — Medication 3 MG: at 11:27

## 2020-08-18 RX ADMIN — PROPOFOL 200 MG: 10 INJECTION, EMULSION INTRAVENOUS at 10:26

## 2020-08-18 RX ADMIN — LIDOCAINE HYDROCHLORIDE 50 MG: 10 INJECTION, SOLUTION INFILTRATION; PERINEURAL at 10:26

## 2020-08-18 RX ADMIN — LABETALOL 20 MG/4 ML (5 MG/ML) INTRAVENOUS SYRINGE 5 MG: at 10:58

## 2020-08-18 RX ADMIN — FENTANYL CITRATE 50 MCG: 50 INJECTION, SOLUTION INTRAMUSCULAR; INTRAVENOUS at 12:38

## 2020-08-18 RX ADMIN — SODIUM CHLORIDE, POTASSIUM CHLORIDE, SODIUM LACTATE AND CALCIUM CHLORIDE: 600; 310; 30; 20 INJECTION, SOLUTION INTRAVENOUS at 09:30

## 2020-08-18 RX ADMIN — SODIUM CHLORIDE, POTASSIUM CHLORIDE, SODIUM LACTATE AND CALCIUM CHLORIDE: 600; 310; 30; 20 INJECTION, SOLUTION INTRAVENOUS at 12:21

## 2020-08-18 ASSESSMENT — LIFESTYLE VARIABLES: TOBACCO_USE: 0

## 2020-08-18 ASSESSMENT — ENCOUNTER SYMPTOMS
DYSRHYTHMIAS: 0
SEIZURES: 0
STRIDOR: 0

## 2020-08-18 ASSESSMENT — COPD QUESTIONNAIRES: COPD: 0

## 2020-08-18 ASSESSMENT — MIFFLIN-ST. JEOR: SCORE: 1570.02

## 2020-08-18 NOTE — DISCHARGE INSTRUCTIONS
HOME CARE FOLLOWING ABDOMINAL SURGERY  BRAYDEN Johnson, MEY Kulkarni R. O'Donnell &  ULYSSES Ashley      INCISIONAL CARE:  Replace the bandage over your incision (or incisions) until all drainage stops, or if more comfortable to have in place.  If present, leave the steri-strips (white paper tapes) in place for 14 days after surgery.  If you have staples in your incision at the time of discharge, they will be removed at your follow-up appointment.  If Dermabond (a type of skin glue) is present, leave in place until it wears/flakes off.     BATHING:  Avoid baths for 1 week after surgery.  Showers are okay.  You may wash your hair at any time.  Gently pat your incision dry after bathing.    ACTIVITY:  Light Activity -- you may immediately be up and about as tolerated.  Driving -- you may drive when comfortable and off narcotic pain medications.  Light Work -- resume when comfortable off pain medications.  (If you can drive, you probably can work.)  Strenuous Work/Activity -- limit lifting to 20 pounds for 4 weeks.  Then, progressively increase with time.  Active Sports (running, biking, etc.) -- cautiously resume after 6 weeks.    DISCOMFORT:  Use pain medications as prescribed by your surgeon.  Take the pain medication with some food, when possible, to minimize side effects.  Expect gradual improvement.    DIET:  Return to diet you were on before surgery, unless you are given specific diet instructions.  Drink plenty of fluids.  While taking pain medications, increase dietary fiber or add a fiber supplementation like Metamucil or Citrucel to help prevent constipation - a possible side effect of pain medications.      NAUSEA:  If nauseated from the anesthetic/pain meds; rest in bed, get up cautiously with assistance, and drink clear liquids (juice, tea, broth).    RETURN APPOINTMENT:  Schedule a follow-up visit 2-3 weeks after discharge from the hospital.  Office Phone:   [phone]    CONTACT US IF THE FOLLOWING DEVELOPS:  1.  A fever that is above 101    2. If there is a large amount of drainage, bleeding, or swelling.  3.  Severe pain that is not relieved by your prescription.  4.  Drainage that is thick, cloudy, yellow, green or white.  5. Any other questions not answered by  Frequently Asked Questions  sheet.       FREQUENTLY ASKED QUESTIONS AFTER SURGERY  Deven Mehta, BRAYDEN Johnson, NAOMIE Jacome, MEY Moncada, AARON Chun  & ULYSSES Ashley      Q:  How should my incision look?    A:  Normally your incision will appear slightly swollen with light redness directly along the incision itself as it heals.  It may feel like a bump or ridge as the healing/scarring happens, and over time (3-4 months) this bump or ridge feeling should slowly go away.  In general, clear or pink watery drainage can be normal at first as your incision heals, but should decrease over time.    Q:  How do I know if my incision is infected?  A:  Look at your incision for signs of infection, like redness around the incision spreading to surrounding skin, or drainage of cloudy or foul-smelling drainage.  If you feel warm, check your temperature to see if you are running a fever.    **If any of these things occur, please notify the nurse at our office.  We may need you to come into the office for an incision check.      Q:  How do I take care of my incision?  A:  If you have a dressing in place - Starting the day after surgery, replace the dressing 1-2 times a day until there is no further drainage from the incision.  At that time, a dressing is no longer needed.  Try to minimize tape on the skin if irritation is occurring at the tape sites.  If you have significant irritation from tape on the skin, please call the office to discuss other method of dressing your incision.    Small pieces of tape called  steri-strips  may be present directly overlying your incision; these may be removed 10 days after surgery unless  otherwise specified by your surgeon.  If these tapes start to loosen at the ends, you may trim them back until they fall off or are removed.    A:  If you had  Dermabond  tissue glue used as a dressing (this causes your incision to look shiny with a clear covering over it) - This type of dressing wears off with time and does not require more dressings over the top unless it is draining around the glue as it wears off.  Do not apply ointments or lotions over the incisions until the glue has completely worn off.    Q:  There is a piece of tape or a sticky  lead  still on my skin.  Can I remove this?  A:  Sometimes the sticky  leads  used for monitoring during surgery or for evaluation in the emergency department are not all removed while you are in the hospital.  These sometimes have a tab or metal dot on them.  You can easily remove these on your own, like taking off a band-aid.  If there is a gel substance under the  lead , simply wipe/clean it off with a washcloth or paper towel.      Q:  What can I do to minimize constipation (very hard stools, or lack of stools)?  A:  Stay well hydrated.  Increase your dietary fiber intake or take a fiber supplement -with plenty of water.  Walk around frequently.  You may consider an over-the-counter stool-softener.  Your Pharmacist can assist you with choosing one that is stocked at your pharmacy.  Constipation is also one of the most common side effects of pain medication.  If you are using pain medication, be pro-active and try to PREVENT problems with constipation by taking the steps above BEFORE constipation becomes a problem.    Q:  What do I do if I need more pain medications?  A:  Call the office to receive refills.  Be aware that certain pain meds cannot be called into a pharmacy and actually require a paper prescription.  A change may be made in your pain med as you progress thru your recovery period or if you have side effects to certain meds.    --Pain meds are NOT  refilled after 5pm on weekdays, and NOT AT ALL on the weekends, so please look ahead to prevent problems.                  Q:  Why am I having a hard time sleeping now that I am at home?  A:  Many medications you receive while you are in the hospital can impact your sleep for a number of days after your surgery/hospitalization.  Decreased level of activity and naps during the day may also make sleeping at night difficult.  Try to minimize day-time naps, and get up frequently during the day to walk around your home during your recovery time.  Sleep aides may be of some help, but are not recommended for long-term use.      Q:  I am having some back discomfort.  What should I do?  A:  This may be related to certain positioning that was required for your surgery, extended periods of time in bed, or other changes in your overall activity level.  You may try ice, heat, acetaminophen, or ibuprofen to treat this temporarily.  Note that many pain medications have acetaminophen in them and would state this on the prescription bottle.  Be sure not to exceed the maximum of 4000mg per day of acetaminophen.     **If the pain you are having does not resolve, is severe, or is a flare of back pain you have had on other occasions prior to surgery, please contact your primary physician for further recommendations or for an appointment to be examined at their office.    Q:  Why am I having headaches?  A:  Headaches can be caused by many things:  caffeine withdrawal, use of pain meds, dehydration, high blood pressure, lack of sleep, over-activity/exhaustion, flare-up of usual migraine headaches.  If you feel this is related to muscle tension (a band-like feeling around the head, or a pressure at the low-back of the head) you may try ice or heat to this area.  You may need to drink more fluids (try electrolyte drink like Gatorade), rest, or take your usual migraine medications.   **If your headaches do not resolve, worsen, are  accompanied by other symptoms, or if your blood pressure is high, please call your primary physician for recommendation and/or examination.    Q:  I am unable to urinate.  What do I do?  A:  A small percentage of people can have difficulty urinating initially after surgery.  This includes being able to urinate only a very small amount at a time and feeling discomfort or pressure in the very low abdomen.  This is called  urinary retention , and is actually an urgent situation.  Proceed to your nearest Emergency department for evaluation (not an Urgent Care Center).  Sometimes the bladder does not work correctly after certain medications you receive during surgery, or related to certain procedures.  You may need to have a catheter placed until your bladder recovers.  When planning to go to an Emergency department, it may help to call the ER to let them know you are coming in for this problem after a surgery.  This may help you get in quicker to be evaluated.  **If you have symptoms of a urinary tract infection, please contact your primary physician for the proper evaluation and treatment.              If you have other questions, please call the office Monday thru Friday between 8am and 5pm to discuss with the nurse or physician assistant.  #[phone]    There is a surgeon ON CALL on weekday evenings and over the weekend in case of urgent need only, and may be contacted at the same number.    If you are having an emergency, call 911 or proceed to your nearest emergency department.      GENERAL ANESTHESIA OR SEDATION ADULT DISCHARGE INSTRUCTIONS   SPECIAL PRECAUTIONS FOR 24 HOURS AFTER SURGERY    IT IS NOT UNUSUAL TO FEEL LIGHT-HEADED OR FAINT, UP TO 24 HOURS AFTER SURGERY OR WHILE TAKING PAIN MEDICATION.  IF YOU HAVE THESE SYMPTOMS; SIT FOR A FEW MINUTES BEFORE STANDING AND HAVE SOMEONE ASSIST YOU WHEN YOU GET UP TO WALK OR USE THE BATHROOM.    YOU SHOULD REST AND RELAX FOR THE NEXT 24 HOURS AND YOU MUST MAKE  ARRANGEMENTS TO HAVE SOMEONE STAY WITH YOU FOR AT LEAST 24 HOURS AFTER YOUR DISCHARGE.  AVOID HAZARDOUS AND STRENUOUS ACTIVITIES.  DO NOT MAKE IMPORTANT DECISIONS FOR 24 HOURS.    DO NOT DRIVE ANY VEHICLE OR OPERATE MECHANICAL EQUIPMENT FOR 24 HOURS FOLLOWING THE END OF YOUR SURGERY.  EVEN THOUGH YOU MAY FEEL NORMAL, YOUR REACTIONS MAY BE AFFECTED BY THE MEDICATION YOU HAVE RECEIVED.    DO NOT DRINK ALCOHOLIC BEVERAGES FOR 24 HOURS FOLLOWING YOUR SURGERY.    DRINK CLEAR LIQUIDS (APPLE JUICE, GINGER ALE, 7-UP, BROTH, ETC.).  PROGRESS TO YOUR REGULAR DIET AS YOU FEEL ABLE.    YOU MAY HAVE A DRY MOUTH, A SORE THROAT, MUSCLES ACHES OR TROUBLE SLEEPING.  THESE SHOULD GO AWAY AFTER 24 HOURS.    CALL YOUR DOCTOR FOR ANY OF THE FOLLOWING:  SIGNS OF INFECTION (FEVER, GROWING TENDERNESS AT THE SURGERY SITE, A LARGE AMOUNT OF DRAINAGE OR BLEEDING, SEVERE PAIN, FOUL-SMELLING DRAINAGE, REDNESS OR SWELLING.    IT HAS BEEN OVER 8 TO 10 HOURS SINCE SURGERY AND YOU ARE STILL NOT ABLE TO URINATE (PASS WATER).

## 2020-08-18 NOTE — ANESTHESIA PREPROCEDURE EVALUATION
Anesthesia Pre-Procedure Evaluation    Patient: Keith Kevin   MRN: [id number] : 1950          Preoperative Diagnosis: Left inguinal hernia [K40.90]    Procedure(s):  ROBOTIC ASSISTED LEFT INGUINAL HERNIA REPAIR WITH MESH    Past Medical History:   Diagnosis Date     Varicose veins of lower extremities with inflammation      Past Surgical History:   Procedure Laterality Date     APPENDECTOMY  Age 20     C LAP,HERNIA REPAIR PROC,UNLIST       C NONSPECIFIC PROCEDURE      vein stripping     LIGATE VEIN      Varicose veins, several times     TONSILLECTOMY & ADENOIDECTOMY  Approx age 10     Anesthesia Evaluation     . Pt has had prior anesthetic. Type: General    No history of anesthetic complications          ROS/MED HX    ENT/Pulmonary:  - neg pulmonary ROS    (-) tobacco use, asthma, COPD, DIANNA risk factors and recent URI   Neurologic:  - neg neurologic ROS    (-) seizures and CVA   Cardiovascular:     (+) Dyslipidemia, ----. : . . . :. . Previous cardiac testing date:results:date: results:ECG reviewed date: results:NSR date: results:         (-) hypertension, CAD, arrhythmias and valvular problems/murmurs   METS/Exercise Tolerance:     Hematologic: Comments: No lab results found.   Lab Test        02/28/20     02/12/19     02/07/18     02/06/17     02/01/16     01/16/15                       1010          0821          1000          0845          0853          0929          NA            --           --           --          140          142          140           POTASSIUM     --           --           --          4.2          4.1          3.9           CHLORIDE      --           --           --          105          108          105           CO2           --           --           --          26           27           29            BUN           --           --           --          17           17           19            CR            --           --           --          0.89         0.83          0.90          ANIONGAP      --           --           --          9            7            6             STEPHEN           --           --           --          9.0          9.3          9.0           GLC          86           96           87           89           88           88            - neg hematologic  ROS       Musculoskeletal:  - neg musculoskeletal ROS       GI/Hepatic:  - neg GI/hepatic ROS      (-) GERD   Renal/Genitourinary:  - ROS Renal section negative       Endo:  - neg endo ROS    (-) Type I DM, Type II DM, thyroid disease, chronic steroid usage and obesity   Psychiatric:  - neg psychiatric ROS       Infectious Disease:  - neg infectious disease ROS       Malignancy:      - no malignancy   Other:    - neg other ROS                      Physical Exam  Normal systems: cardiovascular, pulmonary and dental    Airway   Mallampati: II  TM distance: >3 FB  Neck ROM: full    Dental     Cardiovascular   Rhythm and rate: regular and normal  (-) no friction rub, no systolic click and no murmur    Pulmonary    breath sounds clear to auscultation(-) no rhonchi, no decreased breath sounds, no wheezes, no rales and no stridor            Lab Results   Component Value Date    HGB 14.9 11/09/2005     02/06/2017    POTASSIUM 4.2 02/06/2017    CHLORIDE 105 02/06/2017    CO2 26 02/06/2017    BUN 17 02/06/2017    CR 0.89 02/06/2017    GLC 86 02/28/2020    STEPHEN 9.0 02/06/2017    ALBUMIN 3.9 02/08/2010    PROTTOTAL 6.9 02/08/2010    ALT 18 02/08/2010    AST 32 02/08/2010    ALKPHOS 56 02/08/2010    BILITOTAL 0.9 02/08/2010       Preop Vitals  BP Readings from Last 3 Encounters:   08/12/20 120/52   07/27/20 118/64   02/28/20 124/60    Pulse Readings from Last 3 Encounters:   08/12/20 69   07/27/20 72   02/28/20 59      Resp Readings from Last 3 Encounters:   08/12/20 16   07/27/20 16   02/28/20 16    SpO2 Readings from Last 3 Encounters:   08/12/20 99%   07/27/20 98%   02/28/20 99%      Temp Readings from Last 1  "Encounters:   08/12/20 97.4  F (36.3  C) (Tympanic)    Ht Readings from Last 1 Encounters:   08/12/20 1.798 m (5' 10.79\")      Wt Readings from Last 1 Encounters:   08/12/20 80.3 kg (177 lb)    Estimated body mass index is 24.84 kg/m  as calculated from the following:    Height as of 8/12/20: 1.798 m (5' 10.79\").    Weight as of 8/12/20: 80.3 kg (177 lb).       Anesthesia Plan      History & Physical Review  History and physical reviewed and following examination; no interval change.    ASA Status:  2 .    NPO Status:  > 8 hours    Plan for General with Intravenous induction. Maintenance will be Balanced.    PONV prophylaxis:  Ondansetron (or other 5HT-3) and Dexamethasone or Solumedrol         Postoperative Care  Postoperative pain management:  IV analgesics.      Consents  Anesthetic plan, risks, benefits and alternatives discussed with:  Patient or representative, Patient and Spouse..                 Sebas Browne MD                    .  "

## 2020-08-18 NOTE — ANESTHESIA CARE TRANSFER NOTE
Patient: Keith Kevin    Procedure(s):  ROBOTIC ASSISTED LEFT INGUINAL HERNIA REPAIR WITH MESH    Diagnosis: Left inguinal hernia [K40.90]  Diagnosis Additional Information: No value filed.    Anesthesia Type:   General     Note:  Airway :Face Mask  Patient transferred to:PACU  Comments: Patient oral suctioned. Patient with spontaneous respirations and adequate tidal volumes. Patient awake and responsive. Extubated in OR to 6L facemask. To PACU ventilating well. VSS. Report given.Handoff Report: Identifed the Patient, Identified the Reponsible Provider, Reviewed the pertinent medical history, Discussed the surgical course, Reviewed Intra-OP anesthesia mangement and issues during anesthesia, Set expectations for post-procedure period and Allowed opportunity for questions and acknowledgement of understanding      Vitals: (Last set prior to Anesthesia Care Transfer)    CRNA VITALS  8/18/2020 1114 - 8/18/2020 1152      8/18/2020             Pulse:  71    SpO2:  100 %                Electronically Signed By: CLYDE Quarles CRNA  August 18, 2020  11:52 AM

## 2020-08-18 NOTE — ANESTHESIA POSTPROCEDURE EVALUATION
Patient: Keith Kevin    Procedure(s):  ROBOTIC ASSISTED LEFT INGUINAL HERNIA REPAIR WITH MESH    Diagnosis:Left inguinal hernia [K40.90]  Diagnosis Additional Information: No value filed.    Anesthesia Type:  General    Note:  Anesthesia Post Evaluation    Patient location during evaluation: PACU  Patient participation: Able to fully participate in evaluation  Level of consciousness: awake  Pain management: adequate  Airway patency: patent  Cardiovascular status: acceptable  Respiratory status: acceptable  Hydration status: acceptable  PONV: controlled     Anesthetic complications: None          Last vitals:  Vitals:    08/18/20 1300 08/18/20 1333 08/18/20 1427   BP: (!) 143/74 (!) 176/89 (!) 154/72   Pulse: 76     Resp: 14 14 18   Temp: 97.1  F (36.2  C) 96.2  F (35.7  C)    SpO2: 100% 100% 100%         Electronically Signed By: Sebas Browne MD  August 18, 2020  2:33 PM

## 2020-08-18 NOTE — OP NOTE
General Surgery Operative Note    Pre-operative diagnosis:  Left inguinal hernia [K40.90]   Post-operative diagnosis:  Same   Procedure:  Robotic assisted left inguinal hernia repair   Surgeon: Keshav Mehta MD   Assistant(s): Radha Velázquez PA-C - the physician assistant was medically necessary to assist in prepping, positioning, camera operation, retraction/exposure and instrument exchange.   Anesthesia: General    Estimated blood loss: 5 cc's   Drains placed: None   Complications:  None   Findings:   Indirect left inguinal hernia.  Repair was achieved using preperitoneal pro- mesh.  There was no evidence of right sided recurrence.     Indications for operation: This is a 70-year-old gentleman who presented with a left inguinal hernia.  He had previously had the right side repaired.  I recommended a robotic approach, we discussed the procedure, along with its risks and complications, in detail.  The patient agreed to proceed.    Details of the operation: After informed consent, the patient was taken to the operating room where he underwent satisfactory induction of general anesthesia.  The patient was sterilely prepped and draped and a supraumbilical skin incision was made.  Dissection was carried bluntly down to the fascia, which was opened very slightly using electrocautery.  The peritoneum was entered bluntly and the robotic camera port was placed.  Pneumoperitoneum was achieved using CO2 insufflation and, under direct visualization, 2 robotic 8 mm ports were placed, one on either side of the abdomen.  Some adhesions near the patient's old paramedian incision was taken down using blunt dissection and electrocautery.  Inspection of the inguinal regions revealed no evidence of a right-sided recurrence.  The left side revealed an obvious indirect hernia.  The peritoneum was scored above the level of the ASIS and the preperitoneal space was then entered and developed.  The hernia sac was completely  reduced, as was a very small cord lipoma.  Dissection was carried well down below the pubis.  Once the space was fully developed and the hernia reduced, a piece of pro- mesh was brought onto the field.  This was deployed in the preperitoneal space so that it lay smoothly.  The mesh was centered over the internal ring.  The peritoneum was now closed using a running 3-0 V lock suture.  Pneumoperitoneum was then released and the trochars were removed.  The supraumbilical fascia was closed using interrupted 0 Vicryl sutures.  Skin incisions were closed using 4-0 subcuticular Vicryl followed by Steri-Strips.    The patient tolerated the procedure well and was transferred to the recovery room in satisfactory condition.  Sponge and needle counts were correct at the close of the case.    Specimens: * No specimens in log *        Keshav Mehta MD

## 2020-09-08 ENCOUNTER — MYC MEDICAL ADVICE (OUTPATIENT)
Dept: SURGERY | Facility: CLINIC | Age: 70
End: 2020-09-08

## 2020-09-08 NOTE — TELEPHONE ENCOUNTER
Attempted to call patient for post op check.  No answer.  Message was left for patient to call back if they had any questions of concerns. [employer] message was sent as well.    Radha Velázquez PA-C

## 2020-11-14 ENCOUNTER — HEALTH MAINTENANCE LETTER (OUTPATIENT)
Age: 70
End: 2020-11-14

## 2021-03-28 ENCOUNTER — HEALTH MAINTENANCE LETTER (OUTPATIENT)
Age: 71
End: 2021-03-28

## 2021-04-24 DIAGNOSIS — F43.0 ACUTE REACTION TO STRESS: ICD-10-CM

## 2021-04-26 RX ORDER — PROPRANOLOL HYDROCHLORIDE 10 MG/1
TABLET ORAL
Qty: 30 TABLET | Refills: 5 | Status: SHIPPED | OUTPATIENT
Start: 2021-04-26 | End: 2021-08-03

## 2021-04-26 NOTE — TELEPHONE ENCOUNTER
Routing refill request to provider for review/approval because:  BP not in parameters for [facility] protocol for RN to refill

## 2021-07-07 ENCOUNTER — TELEPHONE (OUTPATIENT)
Dept: PEDIATRICS | Facility: CLINIC | Age: 71
End: 2021-07-07

## 2021-07-07 NOTE — TELEPHONE ENCOUNTER
Received call from pt  He had an EKG today right before his colonoscopy  Pt was told that he has A-fib, so they were unable to complete his colonoscopy    Pt has a copy of his EKG  Pt does not having any symptoms- no chest pain, SOB   Appt made for 7/7/21 with PCP    Advised to pt that if her develops CP or SOB he needs to be seen in the ER    Pt verbalized understanding and agrees to the plan    Thank you  Heather Fisher RN on 7/7/2021 at 10:44 AM

## 2021-07-08 ENCOUNTER — OFFICE VISIT (OUTPATIENT)
Dept: PEDIATRICS | Facility: CLINIC | Age: 71
End: 2021-07-08
Payer: COMMERCIAL

## 2021-07-08 VITALS
WEIGHT: 172.3 LBS | TEMPERATURE: 97.7 F | BODY MASS INDEX: 24.12 KG/M2 | SYSTOLIC BLOOD PRESSURE: 124 MMHG | DIASTOLIC BLOOD PRESSURE: 60 MMHG | RESPIRATION RATE: 14 BRPM | HEIGHT: 71 IN | HEART RATE: 80 BPM

## 2021-07-08 DIAGNOSIS — I48.91 ATRIAL FIBRILLATION, UNSPECIFIED TYPE (H): Primary | ICD-10-CM

## 2021-07-08 LAB
ERYTHROCYTE [DISTWIDTH] IN BLOOD BY AUTOMATED COUNT: 12.9 % (ref 10–15)
HCT VFR BLD AUTO: 46 % (ref 40–53)
HGB BLD-MCNC: 15.7 G/DL (ref 13.3–17.7)
MCH RBC QN AUTO: 32.7 PG (ref 26.5–33)
MCHC RBC AUTO-ENTMCNC: 34.1 G/DL (ref 31.5–36.5)
MCV RBC AUTO: 96 FL (ref 78–100)
PLATELET # BLD AUTO: 255 10E9/L (ref 150–450)
RBC # BLD AUTO: 4.8 10E12/L (ref 4.4–5.9)
WBC # BLD AUTO: 7.1 10E9/L (ref 4–11)

## 2021-07-08 PROCEDURE — 85027 COMPLETE CBC AUTOMATED: CPT | Performed by: INTERNAL MEDICINE

## 2021-07-08 PROCEDURE — 99214 OFFICE O/P EST MOD 30 MIN: CPT | Performed by: INTERNAL MEDICINE

## 2021-07-08 PROCEDURE — 80048 BASIC METABOLIC PNL TOTAL CA: CPT | Performed by: INTERNAL MEDICINE

## 2021-07-08 PROCEDURE — 84443 ASSAY THYROID STIM HORMONE: CPT | Performed by: INTERNAL MEDICINE

## 2021-07-08 PROCEDURE — 36415 COLL VENOUS BLD VENIPUNCTURE: CPT | Performed by: INTERNAL MEDICINE

## 2021-07-08 PROCEDURE — 93000 ELECTROCARDIOGRAM COMPLETE: CPT | Performed by: INTERNAL MEDICINE

## 2021-07-08 RX ORDER — PROPRANOLOL HCL 60 MG
60 CAPSULE, EXTENDED RELEASE 24HR ORAL DAILY
Qty: 30 CAPSULE | Refills: 2 | Status: SHIPPED | OUTPATIENT
Start: 2021-07-08 | End: 2021-08-16

## 2021-07-08 ASSESSMENT — MIFFLIN-ST. JEOR: SCORE: 1550.74

## 2021-07-08 NOTE — PROGRESS NOTES
Assessment & Plan       ICD-10-CM    1. Atrial fibrillation, unspecified type (H)  I48.91 Basic metabolic panel     TSH with free T4 reflex     CBC with platelets     EKG 12-lead complete w/read - Clinics     propranolol ER (INDERAL LA) 60 MG 24 hr capsule     Echocardiogram Complete     CARDIOLOGY EVAL ADULT REFERRAL     New diagnosis of atrial fibrillation, initially made at his colonoscopy appointment this week.  Overall not having symptoms related to a-fib - no palpitations, chest pains, dyspnea.  Unclear what duration he has been in a-fib.  Is currently rate controlled. Taking  mg daily.   Discussed management options.    Will start Inderal 60 mg daily (has taken propranolol 10 mg prn in the past for anxiety treatment and has tolerated well).  Check labwork as noted above.  Echocardiogram ordered.  Discussed the option of anticoagulation vs continue with ASA. For the near future, will continue with ASA. Likely will need full anticoagulation, but pt is also interested in cardiology consultation.     Abram Villanueva MD  [facility] ARIELA Chua is a 70 year old who presents for the following health issues       HPI     F/U on abnormal EKG per [facility]  Pt was at [facility] yesterday for a scheduled colonoscopy.  As part of his monitoring, found to be in atrial fibrillation.  No prior dx of a-fib.  Had EKG 8/12/20 with sinus rhythm.  No cardiac sx such as CP, palpitations, PND, orthopnea, LOPEZ or peripheral edema.  He does note abnormal pulse in the past day when he checks.  Chronically taking  mg daily.  Has taken propranolol 10 mg prn in the past for control of anxiety sx.    CHADS2-VASc - 2.2% risk annually    Reviewed possible causes for a-fib, stroke risk, medication options to treat.         Objective    /60 (BP Location: Right arm, Patient Position: Sitting, Cuff Size: Adult Regular)   Pulse 80   Temp 97.7  F (36.5  C) (Tympanic)   Resp 14    "Ht 1.791 m (5' 10.5\")   Wt 78.2 kg (172 lb 4.8 oz)   BMI 24.37 kg/m    Body mass index is 24.37 kg/m .  Physical Exam   GEN: No distress  SKIN: No rashes  NECK: Supple. No LAD or TM.  LUNGS: Clear to auscultation bilaterally. No rhonchi, rales, wheezes or retractions.  CV: Irregular. Normal rate. Normal S1, S2. No M.  EXTR: No edema    EKG showed:  Atrial fibrillation, HR 87. No acute ST or T-wave changes. Normal EKG.            "

## 2021-07-09 LAB
ANION GAP SERPL CALCULATED.3IONS-SCNC: 8 MMOL/L (ref 3–14)
BUN SERPL-MCNC: 21 MG/DL (ref 7–30)
CALCIUM SERPL-MCNC: 9.1 MG/DL (ref 8.5–10.1)
CHLORIDE SERPL-SCNC: 108 MMOL/L (ref 94–109)
CO2 SERPL-SCNC: 22 MMOL/L (ref 20–32)
CREAT SERPL-MCNC: 0.97 MG/DL (ref 0.66–1.25)
GFR SERPL CREATININE-BSD FRML MDRD: 78 ML/MIN/{1.73_M2}
GLUCOSE SERPL-MCNC: 118 MG/DL (ref 70–99)
POTASSIUM SERPL-SCNC: 4 MMOL/L (ref 3.4–5.3)
SODIUM SERPL-SCNC: 138 MMOL/L (ref 133–144)
TSH SERPL DL<=0.005 MIU/L-ACNC: 1.04 MU/L (ref 0.4–4)

## 2021-07-29 ENCOUNTER — HOSPITAL ENCOUNTER (OUTPATIENT)
Dept: CARDIOLOGY | Facility: CLINIC | Age: 71
Discharge: HOME | End: 2021-07-29
Attending: INTERNAL MEDICINE | Admitting: INTERNAL MEDICINE
Payer: COMMERCIAL

## 2021-07-29 DIAGNOSIS — I48.91 ATRIAL FIBRILLATION, UNSPECIFIED TYPE (H): ICD-10-CM

## 2021-07-29 LAB — LVEF ECHO: NORMAL

## 2021-07-29 PROCEDURE — 93306 TTE W/DOPPLER COMPLETE: CPT | Mod: 26 | Performed by: INTERNAL MEDICINE

## 2021-07-29 PROCEDURE — 93306 TTE W/DOPPLER COMPLETE: CPT

## 2021-08-03 ENCOUNTER — OFFICE VISIT (OUTPATIENT)
Dept: CARDIOLOGY | Facility: CLINIC | Age: 71
End: 2021-08-03
Attending: INTERNAL MEDICINE
Payer: COMMERCIAL

## 2021-08-03 VITALS
HEART RATE: 54 BPM | WEIGHT: 174 LBS | HEIGHT: 71 IN | BODY MASS INDEX: 24.36 KG/M2 | SYSTOLIC BLOOD PRESSURE: 134 MMHG | DIASTOLIC BLOOD PRESSURE: 73 MMHG

## 2021-08-03 DIAGNOSIS — I48.91 ATRIAL FIBRILLATION, UNSPECIFIED TYPE (H): ICD-10-CM

## 2021-08-03 DIAGNOSIS — I35.1 AORTIC VALVE INSUFFICIENCY, ETIOLOGY OF CARDIAC VALVE DISEASE UNSPECIFIED: Primary | ICD-10-CM

## 2021-08-03 PROCEDURE — 99204 OFFICE O/P NEW MOD 45 MIN: CPT | Mod: 25 | Performed by: INTERNAL MEDICINE

## 2021-08-03 PROCEDURE — 93000 ELECTROCARDIOGRAM COMPLETE: CPT | Performed by: INTERNAL MEDICINE

## 2021-08-03 ASSESSMENT — MIFFLIN-ST. JEOR: SCORE: 1553.64

## 2021-08-03 NOTE — PROGRESS NOTES
HPI and Plan:   See dictation    Orders Placed This Encounter   Procedures     Follow-Up with Cardiac Advanced Practice Provider     EKG 12-lead complete w/read - Clinics (performed today)     EKG 12-lead complete w/read (Future)- to be scheduled     Leadless EKG Monitor 8 to 14 Days     Echocardiogram Complete       No orders of the defined types were placed in this encounter.      Medications Discontinued During This Encounter   Medication Reason     propranolol (INDERAL) 10 MG tablet      oxyCODONE (ROXICODONE) 5 MG tablet          Encounter Diagnoses   Name Primary?     Atrial fibrillation, unspecified type (H)      Aortic valve insufficiency, etiology of cardiac valve disease unspecified Yes       CURRENT MEDICATIONS:  Current Outpatient Medications   Medication Sig Dispense Refill     ASPIRIN 325 MG OR TBEC 1 tab po QD (Once per day)       calcium carbonate (TUMS) 500 MG chewable tablet Take 1 tablet by mouth daily. 100 tablet 3     Calcium-Magnesium-Zinc (BL CALCIUM-MAGNESIUM-ZINC) 333-133-5 MG TABS Take by mouth daily        Cholecalciferol (VITAMIN D) 1000 UNIT capsule Take 1 capsule by mouth daily. 100 capsule 12     FISH OIL 1200 MG OR CAPS Take by mouth daily        GLUCOSAMINE 500 MG OR CAPS 1500 QD  0     propranolol ER (INDERAL LA) 60 MG 24 hr capsule Take 1 capsule (60 mg) by mouth daily 30 capsule 2     sildenafil (VIAGRA) 100 MG tablet Take 1 tablet (100 mg) by mouth daily as needed (ED) 6 tablet 12       ALLERGIES     Allergies   Allergen Reactions     No Known Drug Allergies        PAST MEDICAL HISTORY:  Past Medical History:   Diagnosis Date     Aortic regurgitation     moderate     New onset atrial fibrillation (H)      Varicose veins of lower extremities with inflammation        PAST SURGICAL HISTORY:  Past Surgical History:   Procedure Laterality Date     APPENDECTOMY  Age 20     C LAP,HERNIA REPAIR PROC,UNLIST  2002     DAVINCI HERNIORRHAPHY INGUINAL Left 8/18/2020    Procedure: ROBOTIC  ASSISTED LEFT INGUINAL HERNIA REPAIR WITH MESH;  Surgeon: Keshav Mehta MD;  Location: [facility] OR     LIGATE VEIN      Varicose veins, several times     TONSILLECTOMY & ADENOIDECTOMY  Approx age 10     ZZC NONSPECIFIC PROCEDURE  2001    vein stripping       FAMILY HISTORY:  Family History   Problem Relation Age of Onset     Eye Disorder Mother         glaucoma     Cancer Sister         breast Ca, diagnosed age 49     Cardiovascular Father         Abdominal aortic aneurysm     C.A.D. No family hx of      Cancer - colorectal No family hx of      Prostate Cancer No family hx of        SOCIAL HISTORY:  Social History     Socioeconomic History     Marital status:      Spouse name: Mary     Number of children: 2     Years of education: None     Highest education level: None   Occupational History     Employer: RETIRED     Comment: Worked as a pharmacist   Tobacco Use     Smoking status: Never Smoker     Smokeless tobacco: Never Used   Substance and Sexual Activity     Alcohol use: Yes     Alcohol/week: 0.0 standard drinks     Comment: 1 drink per week     Drug use: No     Sexual activity: Yes     Partners: Female     Birth control/protection: None   Other Topics Concern      Service Not Asked     Blood Transfusions Not Asked     Caffeine Concern Not Asked     Occupational Exposure Not Asked     Hobby Hazards Not Asked     Sleep Concern Not Asked     Stress Concern Not Asked     Weight Concern Not Asked     Special Diet Not Asked     Back Care No     Exercise Yes     Comment: daily walk and ride bike     Bike Helmet Yes     Seat Belt Yes     Self-Exams Not Asked     Parent/sibling w/ CABG, MI or angioplasty before 65F 55M? Not Asked   Social History Narrative     None     Social Determinants of Health     Financial Resource Strain:      Difficulty of Paying Living Expenses:    Food Insecurity:      Worried About Running Out of Food in the Last Year:      Ran Out of Food in the Last Year:   "  Transportation Needs:      Lack of Transportation (Medical):      Lack of Transportation (Non-Medical):    Physical Activity:      Days of Exercise per Week:      Minutes of Exercise per Session:    Stress:      Feeling of Stress :    Social Connections:      Frequency of Communication with Friends and Family:      Frequency of Social Gatherings with Friends and Family:      Attends [employer] Services:      Active Member of Clubs or Organizations:      Attends Club or Organization Meetings:      Marital Status:    Intimate Partner Violence:      Fear of Current or Ex-Partner:      Emotionally Abused:      Physically Abused:      Sexually Abused:        Review of Systems:  Skin:  Negative       Eyes:  Negative      ENT:  Negative      Respiratory:  Positive for dyspnea on exertion     Cardiovascular:  Negative for;palpitations;chest pain;fatigue Positive for;lightheadedness    Gastroenterology: Negative      Genitourinary:  Negative      Musculoskeletal:  Negative      Neurologic:  Negative      Psychiatric:  Negative      Heme/Lymph/Imm:  Negative      Endocrine:  Negative        Physical Exam:  Vitals: /73   Pulse 54   Ht 1.791 m (5' 10.51\")   Wt 78.9 kg (174 lb)   BMI 24.61 kg/m      Constitutional:  cooperative, alert and oriented, well developed, well nourished, in no acute distress        Skin:  warm and dry to the touch, no apparent skin lesions or masses noted          Head:  normocephalic, no masses or lesions        Eyes:  pupils equal and round, conjunctivae and lids unremarkable, sclera white, no xanthalasma, EOMS intact, no nystagmus        Lymph:No Cervical lymphadenopathy present     ENT:  no pallor or cyanosis, dentition good        Neck:  carotid pulses are full and equal bilaterally, JVP normal, no carotid bruit        Respiratory:  normal breath sounds, clear to auscultation, normal A-P diameter, normal symmetry, normal respiratory excursion, no use of accessory muscles         Cardiac: " regular rhythm, normal S1/S2, no S3 or S4, apical impulse not displaced, no murmurs, gallops or rubs                                                         GI:  abdomen soft, non-tender, BS normoactive, no mass, no HSM, no bruits        Extremities and Muscular Skeletal:  no deformities, clubbing, cyanosis, erythema observed              Neurological:  no gross motor deficits        Psych:  Alert and Oriented x 3        CC  Abram Villanueva MD  [address] [address]

## 2021-08-03 NOTE — LETTER
8/3/2021    Abram Villanueva MD  [address] [place] [address]    RE: Keith Kevin       Dear Colleague,    I had the pleasure of seeing Keith Kevin in the [facility].    HPI and Plan:   See dictation    Orders Placed This Encounter   Procedures     Follow-Up with Cardiac Advanced Practice Provider     EKG 12-lead complete w/read - Clinics (performed today)     EKG 12-lead complete w/read (Future)- to be scheduled     Leadless EKG Monitor 8 to 14 Days     Echocardiogram Complete       No orders of the defined types were placed in this encounter.      Medications Discontinued During This Encounter   Medication Reason     propranolol (INDERAL) 10 MG tablet      oxyCODONE (ROXICODONE) 5 MG tablet          Encounter Diagnoses   Name Primary?     Atrial fibrillation, unspecified type (H)      Aortic valve insufficiency, etiology of cardiac valve disease unspecified Yes       CURRENT MEDICATIONS:  Current Outpatient Medications   Medication Sig Dispense Refill     ASPIRIN 325 MG OR TBEC 1 tab po QD (Once per day)       calcium carbonate (TUMS) 500 MG chewable tablet Take 1 tablet by mouth daily. 100 tablet 3     Calcium-Magnesium-Zinc (BL CALCIUM-MAGNESIUM-ZINC) 333-133-5 MG TABS Take by mouth daily        Cholecalciferol (VITAMIN D) 1000 UNIT capsule Take 1 capsule by mouth daily. 100 capsule 12     FISH OIL 1200 MG OR CAPS Take by mouth daily        GLUCOSAMINE 500 MG OR CAPS 1500 QD  0     propranolol ER (INDERAL LA) 60 MG 24 hr capsule Take 1 capsule (60 mg) by mouth daily 30 capsule 2     sildenafil (VIAGRA) 100 MG tablet Take 1 tablet (100 mg) by mouth daily as needed (ED) 6 tablet 12       ALLERGIES     Allergies   Allergen Reactions     No Known Drug Allergies        PAST MEDICAL HISTORY:  Past Medical History:   Diagnosis Date     Aortic regurgitation     moderate     New onset atrial fibrillation (H)      Varicose veins of lower  extremities with inflammation        PAST SURGICAL HISTORY:  Past Surgical History:   Procedure Laterality Date     APPENDECTOMY  Age 20     C LAP,HERNIA REPAIR PROC,UNLIST  2002     DAVINCI HERNIORRHAPHY INGUINAL Left 8/18/2020    Procedure: ROBOTIC ASSISTED LEFT INGUINAL HERNIA REPAIR WITH MESH;  Surgeon: Keshav Mehta MD;  Location: [facility] OR     LIGATE VEIN      Varicose veins, several times     TONSILLECTOMY & ADENOIDECTOMY  Approx age 10     ZZC NONSPECIFIC PROCEDURE  2001    vein stripping       FAMILY HISTORY:  Family History   Problem Relation Age of Onset     Eye Disorder Mother         glaucoma     Cancer Sister         breast Ca, diagnosed age 49     Cardiovascular Father         Abdominal aortic aneurysm     C.A.D. No family hx of      Cancer - colorectal No family hx of      Prostate Cancer No family hx of        SOCIAL HISTORY:  Social History     Socioeconomic History     Marital status:      Spouse name: Mary     Number of children: 2     Years of education: None     Highest education level: None   Occupational History     Employer: RETIRED     Comment: Worked as a pharmacist   Tobacco Use     Smoking status: Never Smoker     Smokeless tobacco: Never Used   Substance and Sexual Activity     Alcohol use: Yes     Alcohol/week: 0.0 standard drinks     Comment: 1 drink per week     Drug use: No     Sexual activity: Yes     Partners: Female     Birth control/protection: None   Other Topics Concern      Service Not Asked     Blood Transfusions Not Asked     Caffeine Concern Not Asked     Occupational Exposure Not Asked     Hobby Hazards Not Asked     Sleep Concern Not Asked     Stress Concern Not Asked     Weight Concern Not Asked     Special Diet Not Asked     Back Care No     Exercise Yes     Comment: daily walk and ride bike     Bike Helmet Yes     Seat Belt Yes     Self-Exams Not Asked     Parent/sibling w/ CABG, MI or angioplasty before 65F 55M? Not Asked   Social History  "Narrative     None     Social Determinants of Health     Financial Resource Strain:      Difficulty of Paying Living Expenses:    Food Insecurity:      Worried About Running Out of Food in the Last Year:      Ran Out of Food in the Last Year:    Transportation Needs:      Lack of Transportation (Medical):      Lack of Transportation (Non-Medical):    Physical Activity:      Days of Exercise per Week:      Minutes of Exercise per Session:    Stress:      Feeling of Stress :    Social Connections:      Frequency of Communication with Friends and Family:      Frequency of Social Gatherings with Friends and Family:      Attends Mu-ism Services:      Active Member of Clubs or Organizations:      Attends Club or Organization Meetings:      Marital Status:    Intimate Partner Violence:      Fear of Current or Ex-Partner:      Emotionally Abused:      Physically Abused:      Sexually Abused:        Review of Systems:  Skin:  Negative       Eyes:  Negative      ENT:  Negative      Respiratory:  Positive for dyspnea on exertion     Cardiovascular:  Negative for;palpitations;chest pain;fatigue Positive for;lightheadedness    Gastroenterology: Negative      Genitourinary:  Negative      Musculoskeletal:  Negative      Neurologic:  Negative      Psychiatric:  Negative      Heme/Lymph/Imm:  Negative      Endocrine:  Negative        Physical Exam:  Vitals: /73   Pulse 54   Ht 1.791 m (5' 10.51\")   Wt 78.9 kg (174 lb)   BMI 24.61 kg/m      Constitutional:  cooperative, alert and oriented, well developed, well nourished, in no acute distress        Skin:  warm and dry to the touch, no apparent skin lesions or masses noted          Head:  normocephalic, no masses or lesions        Eyes:  pupils equal and round, conjunctivae and lids unremarkable, sclera white, no xanthalasma, EOMS intact, no nystagmus        Lymph:No Cervical lymphadenopathy present     ENT:  no pallor or cyanosis, dentition good        Neck:  carotid " pulses are full and equal bilaterally, JVP normal, no carotid bruit        Respiratory:  normal breath sounds, clear to auscultation, normal A-P diameter, normal symmetry, normal respiratory excursion, no use of accessory muscles         Cardiac: regular rhythm, normal S1/S2, no S3 or S4, apical impulse not displaced, no murmurs, gallops or rubs                                                         GI:  abdomen soft, non-tender, BS normoactive, no mass, no HSM, no bruits        Extremities and Muscular Skeletal:  no deformities, clubbing, cyanosis, erythema observed              Neurological:  no gross motor deficits        Psych:  Alert and Oriented x 3        CC  Abram Villanueva MD  [address] [place] [address] [place] [address]                  Thank you for allowing me to participate in the care of your patient.      Sincerely,     Francisco Dubon MD     [facility]  cc:   Abram Villanueva MD  [address] [place] [address] [place] [address]

## 2021-08-06 ENCOUNTER — HOSPITAL ENCOUNTER (OUTPATIENT)
Dept: CARDIOLOGY | Facility: CLINIC | Age: 71
Discharge: HOME | End: 2021-08-06
Attending: INTERNAL MEDICINE | Admitting: INTERNAL MEDICINE
Payer: COMMERCIAL

## 2021-08-06 DIAGNOSIS — I35.1 AORTIC VALVE INSUFFICIENCY, ETIOLOGY OF CARDIAC VALVE DISEASE UNSPECIFIED: ICD-10-CM

## 2021-08-06 PROCEDURE — 93248 EXT ECG>7D<15D REV&INTERPJ: CPT | Performed by: INTERNAL MEDICINE

## 2021-08-06 PROCEDURE — 93246 EXT ECG>7D<15D RECORDING: CPT

## 2021-08-16 ENCOUNTER — TELEPHONE (OUTPATIENT)
Dept: CARDIOLOGY | Facility: CLINIC | Age: 71
End: 2021-08-16

## 2021-08-16 DIAGNOSIS — I48.0 PAROXYSMAL ATRIAL FIBRILLATION (H): Primary | ICD-10-CM

## 2021-08-16 RX ORDER — FLECAINIDE ACETATE 100 MG/1
100 TABLET ORAL 2 TIMES DAILY
Qty: 60 TABLET | Refills: 4 | Status: SHIPPED | OUTPATIENT
Start: 2021-08-16 | End: 2021-12-14

## 2021-08-16 NOTE — TELEPHONE ENCOUNTER
PT EKG reviewed by Dr Dubon A Fib rate 75 bpm.  Pt is to start Eliquis 5 mg bid and stop the Propranolol. Pt to have EKG on Friday at 0930.  Pt mentioned that he  plays an Oboe and noticed that he can take a breath, but he can not release all of his breath.  Pt notes that he and wife rode bikes and walked with no issue. Pt admits that he does not notice the A Fib when active, but when he is laying on his left side or quiet he does. The A Fib makes pt a bit anxious.  Pt states that they are heading out east to go hiking in the [facility] in a couple weeks and are worried while away. Discussed this all with Dr Dubon who recommended starting Flecainide 100 mg bid and have an EKG prior to leaving town. Pt will come in on Friday at have an EKG at 0930.  Escripts have been sent and Eliquis 30 day free card has been included with prescription.  Pt will stop the  mg and again will stop the Propanolol.  Pt to call with any questions. Yi

## 2021-08-16 NOTE — PROGRESS NOTES
Pt was in clinic this morning as an Ambulatory EKG follow up.  Results were given to Kirsty FRENCH RN to review.  Pt was seen by RN and sent home afterwards.    OLENA Simpson  08/16/2021

## 2021-08-16 NOTE — TELEPHONE ENCOUNTER
Spoke to pt who was in A Fib since Saturday and states that his rate is 80-90's. Pt states that he is symptomatic-lightheaded, nauseous. Pt did resume his propranolol.  Pt normal heart rate is in the 60's per patient.   Discussed pt ZP that was placed on 8/6, per pt that his ZP fell off  on 8/7 and he called and spoke to someone on Monday 8/9. Explained that we had not heard anything of the ZP falling off.  Pt will come in for an EKG at 1030. Will then see if pt should wear another ZP.  Yi

## 2021-08-20 ENCOUNTER — TELEPHONE (OUTPATIENT)
Dept: CARDIOLOGY | Facility: CLINIC | Age: 71
End: 2021-08-20

## 2021-08-20 DIAGNOSIS — I48.0 PAROXYSMAL ATRIAL FIBRILLATION (H): Primary | ICD-10-CM

## 2021-08-20 PROCEDURE — 93000 ELECTROCARDIOGRAM COMPLETE: CPT

## 2021-08-20 NOTE — TELEPHONE ENCOUNTER
ECG looks fine.  May stop anticoagulation when the repeat Ziopatch does not show AF while on flecainide.

## 2021-08-20 NOTE — TELEPHONE ENCOUNTER
Patient here for EKG after initiation of Flecainide on 8/16. Newly diagnosed with AF during recent colonoscopy.  Had several questions regarding AF.  Asking how long does he have to be on blood thinner.  Currently wearing leadless monitor.  Will have Dr Dubon review EKG.  GENEVIEVE Ortega

## 2021-08-20 NOTE — TELEPHONE ENCOUNTER
Spoke to patient regarding EKG looking good.  Patient reports he is NOT  wearing the monitor as it was defective and fell off after day two.  Will reach out to let Dr Dubon know and to see if he wants patient to wear another one.  GENEVIEVE Ortega

## 2021-08-20 NOTE — TELEPHONE ENCOUNTER
Understand.  Suggest checking his pulse rate daily on the regular basis. Ok to stop anticoagulation if there is no evidence of AF.

## 2021-08-25 NOTE — TELEPHONE ENCOUNTER
Spoke to patient regarding recommendations per Dr Dubon:  ----Suggest checking his pulse rate daily on the regular basis. Ok to stop anticoagulation if there is no evidence of AF.   Patient aware when he is in AF.  He will check HR daily and contact clinic if he goes into AF.  Medication list updated in epic.  Patient provided verbal understanding regarding above.  GENEVIEVE Ortega

## 2021-08-27 NOTE — TELEPHONE ENCOUNTER
Spoke to patient regarding recommendations per Dr Dubon:  ---May consider a Zio for 2 weeks before consideration of restarting anticoagulation.     Orders placed in epic.  Patient provided verbal understanding regarding above.  Will have scheduling call patient on Monday to schedule.  Patient did report he will be out of town through 9/12.  GENEVIEVE Ortega

## 2021-08-27 NOTE — TELEPHONE ENCOUNTER
Patient called has only been off of AC for one day.  Yesterday had AF episode lasting short period of time converted on his own.  Called to see if he should resume his AC.  Will update Dr Maude OrtegaRN

## 2021-09-12 ENCOUNTER — HEALTH MAINTENANCE LETTER (OUTPATIENT)
Age: 71
End: 2021-09-12

## 2021-09-15 ENCOUNTER — HOSPITAL ENCOUNTER (OUTPATIENT)
Dept: CARDIOLOGY | Facility: CLINIC | Age: 71
Discharge: HOME | End: 2021-09-15
Attending: INTERNAL MEDICINE | Admitting: INTERNAL MEDICINE
Payer: COMMERCIAL

## 2021-09-15 DIAGNOSIS — I48.0 PAROXYSMAL ATRIAL FIBRILLATION (H): ICD-10-CM

## 2021-09-15 PROCEDURE — 93246 EXT ECG>7D<15D RECORDING: CPT

## 2021-09-15 PROCEDURE — 93248 EXT ECG>7D<15D REV&INTERPJ: CPT | Performed by: INTERNAL MEDICINE

## 2021-09-21 ENCOUNTER — VIRTUAL VISIT (OUTPATIENT)
Dept: PEDIATRICS | Facility: CLINIC | Age: 71
End: 2021-09-21
Payer: COMMERCIAL

## 2021-09-21 DIAGNOSIS — F43.0 ACUTE REACTION TO STRESS: Primary | ICD-10-CM

## 2021-09-21 DIAGNOSIS — I48.91 ATRIAL FIBRILLATION, UNSPECIFIED TYPE (H): ICD-10-CM

## 2021-09-21 PROCEDURE — 99441 PR PHYSICIAN TELEPHONE EVALUATION 5-10 MIN: CPT | Mod: 95 | Performed by: INTERNAL MEDICINE

## 2021-09-21 RX ORDER — HYDROXYZINE HYDROCHLORIDE 25 MG/1
25 TABLET, FILM COATED ORAL 3 TIMES DAILY PRN
Qty: 30 TABLET | Refills: 2 | Status: SHIPPED | OUTPATIENT
Start: 2021-09-21 | End: 2022-10-20

## 2021-09-21 RX ORDER — PROPRANOLOL HYDROCHLORIDE 10 MG/1
TABLET ORAL
Qty: 30 TABLET | Refills: 11 | Status: SHIPPED | OUTPATIENT
Start: 2021-09-21 | End: 2022-03-22 | Stop reason: ALTCHOICE

## 2021-09-21 NOTE — PROGRESS NOTES
Harshil is a 71 year old who is being evaluated via a billable telephone visit.      What phone number would you like to be contacted at? [phone]  How would you like to obtain your AVS? MyChart    Assessment & Plan       ICD-10-CM    1. Acute reaction to stress  F43.0 hydrOXYzine (ATARAX) 25 MG tablet     propranolol (INDERAL) 10 MG tablet   2. Atrial fibrillation, unspecified type (H)  I48.91      Stress reaction / anxiety. Having intermittent sx since stopping propranolol.  Discussed options for treatment.  Will send rx for hydroxyzine, can take prn.     Questions for Dr. Dubon:  Change back to propranolol   Or add prn 10 mg propranolol to flecainide   Per his response, OK To try prn propranolol, will need to monitor HR     Rx sent in for this as well.    Abram Villanueva MD  [facility]      Subjective    Harshil is a 71 year old who presents for the following health issues     HPI     Anxiety due to Afib diagnosis    Patient would like to discuss going back onto Propranolol for the anxiety.       PHQ 9/21/2021   PHQ-9 Total Score 0   Q9: Thoughts of better off dead/self-harm past 2 weeks Not at all     MERLENE-7 SCORE 9/21/2021   Total Score 4 (minimal anxiety)   Total Score 4     Atrial fibrillation and anxiety.  Recent dx of a-fib.  Initially treated with propranolol.   Evaluated by Dr. Dubon from cardiology.   Heart monitor done, noted intermittent bradycardia w/ HR into the 50's.   Last month, changed from propranolol to flecainide.  Since the med change, Harshil has noted increased anxiety sx.  Would like to review treatment options.  In the past prn propranolol worked very well for sx control.         Objective         Vitals:  No vitals were obtained today due to virtual visit.    Physical Exam   GEN: No distress  PSYCH: Alert, affect is normal  RESP: No cough, no audible wheezing, able to talk in full sentences  Remainder of exam unable to be completed due to telephone visit            Phone call  duration: 8 minutes

## 2021-10-26 ENCOUNTER — MYC MEDICAL ADVICE (OUTPATIENT)
Dept: PEDIATRICS | Facility: CLINIC | Age: 71
End: 2021-10-26

## 2021-10-26 ENCOUNTER — E-VISIT (OUTPATIENT)
Dept: URGENT CARE | Facility: CLINIC | Age: 71
End: 2021-10-26
Payer: COMMERCIAL

## 2021-10-26 ENCOUNTER — LAB (OUTPATIENT)
Dept: URGENT CARE | Facility: URGENT CARE | Age: 71
End: 2021-10-26
Attending: EMERGENCY MEDICINE
Payer: COMMERCIAL

## 2021-10-26 DIAGNOSIS — Z20.822 SUSPECTED COVID-19 VIRUS INFECTION: ICD-10-CM

## 2021-10-26 DIAGNOSIS — Z20.822 SUSPECTED COVID-19 VIRUS INFECTION: Primary | ICD-10-CM

## 2021-10-26 PROCEDURE — U0005 INFEC AGEN DETEC AMPLI PROBE: HCPCS

## 2021-10-26 PROCEDURE — U0003 INFECTIOUS AGENT DETECTION BY NUCLEIC ACID (DNA OR RNA); SEVERE ACUTE RESPIRATORY SYNDROME CORONAVIRUS 2 (SARS-COV-2) (CORONAVIRUS DISEASE [COVID-19]), AMPLIFIED PROBE TECHNIQUE, MAKING USE OF HIGH THROUGHPUT TECHNOLOGIES AS DESCRIBED BY CMS-2020-01-R: HCPCS

## 2021-10-26 PROCEDURE — 99421 OL DIG E/M SVC 5-10 MIN: CPT | Performed by: EMERGENCY MEDICINE

## 2021-10-26 NOTE — PATIENT INSTRUCTIONS
Dear Keith Kevin,    Your symptoms show that you may have coronavirus (COVID-19). This illness can cause fever, cough and trouble breathing. Many people get a mild case and get better on their own. Some people can get very sick.    Will I be tested for COVID-19?  We would like to test you for Covid-19 virus. I have placed orders for this test.     To schedule: go to your [facility] home page and scroll down to the section that says  You have an appointment that needs to be scheduled  and click the large green button that says  Schedule Now  and follow the steps to find the next available openings.    If you are unable to complete these [facility] scheduling steps, please call [phone] to schedule your testing.     Return to work/school/ guidance:  Please let your workplace manager and staffing office know when your quarantine ends     We can t give you an exact date as it depends on the above. You can calculate this on your own or work with your manager/staffing office to calculate this. (For example if you were exposed on 10/4, you would have to quarantine for 14 full days. That would be through 10/18. You could return on 10/19.)      If you receive a positive COVID-19 test result, follow the guidance of the those who are giving you the results. Usually the return to work is 10 (or in some cases 20 days from symptom onset.) If you work at [facility], you must also be cleared by Employee Occupational Health and Safety to return to work.        If you receive a negative COVID-19 test result and did not have a high risk exposure to someone with a known positive COVID-19 test, you can return to work once you're free of fever for 24 hours without fever-reducing medication and your symptoms are improving or resolved.      If you receive a negative COVID-19 test and If you had a high risk exposure to someone who has tested positive for COVID-19 then you can return to work 14 days after your last contact  with the positive individual    Note: If you have ongoing exposure to the covid positive person, this quarantine period may be more than 14 days. (For example, if you are continued to be exposed to your child who tested positive and cannot isolate from them, then the quarantine of 7-14 days can't start until your child is no longer contagious. This is typically 10 days from onset of the child's symptoms. So the total duration may be 17-24 days in this case.)    Sign up for [employer].   We know it's scary to hear that you might have COVID-19. We want to track your symptoms to make sure you're okay over the next 2 weeks. Please look for an email from [employer]--this is a free, online program that we'll use to keep in touch. To sign up, follow the link in the email you will receive. Learn more at [email or web address]    How can I take care of myself?    Get lots of rest. Drink extra fluids (unless a doctor has told you not to)    Take Tylenol (acetaminophen) or ibuprofen for fever or pain. If you have liver or kidney problems, ask your family doctor if it's okay to take Tylenol o ibuprofen    If you have other health problems (like cancer, heart failure, an organ transplant or severe kidney disease): Call your specialty clinic if you don't feel better in the next 2 days.    Know when to call 911. Emergency warning signs include:  o Trouble breathing or shortness of breath  o Pain or pressure in the chest that doesn't go away  o Feeling confused like you haven't felt before, or not being able to wake up  o Bluish-colored lips or face    Where can I get more information?  [email or web address] [place] - About COVID-19:   [email or web address]    CDC - What to Do If You're Sick:   www.cdc.gov/coronavirus/2019-ncov/about/steps-when-sick.html

## 2021-10-28 LAB — SARS-COV-2 RNA RESP QL NAA+PROBE: POSITIVE

## 2021-11-29 DIAGNOSIS — N52.9 ERECTILE DYSFUNCTION, UNSPECIFIED ERECTILE DYSFUNCTION TYPE: ICD-10-CM

## 2021-11-30 RX ORDER — SILDENAFIL 100 MG/1
TABLET, FILM COATED ORAL
Qty: 6 TABLET | Refills: 11 | Status: SHIPPED | OUTPATIENT
Start: 2021-11-30 | End: 2022-12-13

## 2021-11-30 NOTE — TELEPHONE ENCOUNTER
Prescription approved per [facility] Refill Protocol.    Sussy Fuentes RN on 11/30/2021 at 12:03 PM

## 2021-12-14 DIAGNOSIS — I48.0 PAROXYSMAL ATRIAL FIBRILLATION (H): ICD-10-CM

## 2021-12-14 RX ORDER — FLECAINIDE ACETATE 100 MG/1
100 TABLET ORAL 2 TIMES DAILY
Qty: 180 TABLET | Refills: 2 | Status: SHIPPED | OUTPATIENT
Start: 2021-12-14 | End: 2022-03-09

## 2022-03-09 DIAGNOSIS — I48.0 PAROXYSMAL ATRIAL FIBRILLATION (H): ICD-10-CM

## 2022-03-09 RX ORDER — FLECAINIDE ACETATE 100 MG/1
100 TABLET ORAL 2 TIMES DAILY
Qty: 180 TABLET | Refills: 1 | Status: SHIPPED | OUTPATIENT
Start: 2022-03-09 | End: 2022-08-16

## 2022-03-15 SDOH — HEALTH STABILITY: PHYSICAL HEALTH: ON AVERAGE, HOW MANY DAYS PER WEEK DO YOU ENGAGE IN MODERATE TO STRENUOUS EXERCISE (LIKE A BRISK WALK)?: 7 DAYS

## 2022-03-15 SDOH — ECONOMIC STABILITY: INCOME INSECURITY: IN THE LAST 12 MONTHS, WAS THERE A TIME WHEN YOU WERE NOT ABLE TO PAY THE MORTGAGE OR RENT ON TIME?: NO

## 2022-03-15 SDOH — ECONOMIC STABILITY: INCOME INSECURITY: HOW HARD IS IT FOR YOU TO PAY FOR THE VERY BASICS LIKE FOOD, HOUSING, MEDICAL CARE, AND HEATING?: NOT HARD AT ALL

## 2022-03-15 SDOH — ECONOMIC STABILITY: FOOD INSECURITY: WITHIN THE PAST 12 MONTHS, YOU WORRIED THAT YOUR FOOD WOULD RUN OUT BEFORE YOU GOT MONEY TO BUY MORE.: NEVER TRUE

## 2022-03-15 SDOH — ECONOMIC STABILITY: TRANSPORTATION INSECURITY
IN THE PAST 12 MONTHS, HAS THE LACK OF TRANSPORTATION KEPT YOU FROM MEDICAL APPOINTMENTS OR FROM GETTING MEDICATIONS?: NO

## 2022-03-15 SDOH — HEALTH STABILITY: PHYSICAL HEALTH: ON AVERAGE, HOW MANY MINUTES DO YOU ENGAGE IN EXERCISE AT THIS LEVEL?: 150+ MIN

## 2022-03-15 SDOH — ECONOMIC STABILITY: FOOD INSECURITY: WITHIN THE PAST 12 MONTHS, THE FOOD YOU BOUGHT JUST DIDN'T LAST AND YOU DIDN'T HAVE MONEY TO GET MORE.: NEVER TRUE

## 2022-03-15 SDOH — ECONOMIC STABILITY: TRANSPORTATION INSECURITY
IN THE PAST 12 MONTHS, HAS LACK OF TRANSPORTATION KEPT YOU FROM MEETINGS, WORK, OR FROM GETTING THINGS NEEDED FOR DAILY LIVING?: NO

## 2022-03-15 ASSESSMENT — ENCOUNTER SYMPTOMS
CHILLS: 0
DYSURIA: 0
NAUSEA: 0
DIARRHEA: 0
HEMATURIA: 0
HEADACHES: 0
FEVER: 0
COUGH: 0
MYALGIAS: 0
HEARTBURN: 0
SHORTNESS OF BREATH: 1
NERVOUS/ANXIOUS: 1
PARESTHESIAS: 0
SORE THROAT: 0
PALPITATIONS: 0
ABDOMINAL PAIN: 0
HEMATOCHEZIA: 0
JOINT SWELLING: 0
FREQUENCY: 0
ARTHRALGIAS: 0
WEAKNESS: 0
CONSTIPATION: 0
EYE PAIN: 0
DIZZINESS: 1

## 2022-03-15 ASSESSMENT — SOCIAL DETERMINANTS OF HEALTH (SDOH)
DO YOU BELONG TO ANY CLUBS OR ORGANIZATIONS SUCH AS CHURCH GROUPS UNIONS, FRATERNAL OR ATHLETIC GROUPS, OR SCHOOL GROUPS?: YES
IN A TYPICAL WEEK, HOW MANY TIMES DO YOU TALK ON THE PHONE WITH FAMILY, FRIENDS, OR NEIGHBORS?: ONCE A WEEK
HOW OFTEN DO YOU ATTEND CHURCH OR RELIGIOUS SERVICES?: MORE THAN 4 TIMES PER YEAR
HOW OFTEN DO YOU GET TOGETHER WITH FRIENDS OR RELATIVES?: THREE TIMES A WEEK

## 2022-03-15 ASSESSMENT — LIFESTYLE VARIABLES
HOW MANY STANDARD DRINKS CONTAINING ALCOHOL DO YOU HAVE ON A TYPICAL DAY: 1 OR 2
HOW OFTEN DO YOU HAVE A DRINK CONTAINING ALCOHOL: 2-4 TIMES A MONTH
HOW OFTEN DO YOU HAVE SIX OR MORE DRINKS ON ONE OCCASION: NEVER

## 2022-03-15 ASSESSMENT — ACTIVITIES OF DAILY LIVING (ADL): CURRENT_FUNCTION: NO ASSISTANCE NEEDED

## 2022-03-18 ENCOUNTER — OFFICE VISIT (OUTPATIENT)
Dept: PEDIATRICS | Facility: CLINIC | Age: 72
End: 2022-03-18
Payer: COMMERCIAL

## 2022-03-18 VITALS
SYSTOLIC BLOOD PRESSURE: 120 MMHG | OXYGEN SATURATION: 99 % | HEART RATE: 76 BPM | BODY MASS INDEX: 25.2 KG/M2 | RESPIRATION RATE: 16 BRPM | TEMPERATURE: 98.2 F | HEIGHT: 71 IN | WEIGHT: 180 LBS | DIASTOLIC BLOOD PRESSURE: 58 MMHG

## 2022-03-18 DIAGNOSIS — Z00.00 ROUTINE GENERAL MEDICAL EXAMINATION AT A HEALTH CARE FACILITY: Primary | ICD-10-CM

## 2022-03-18 DIAGNOSIS — R06.02 SHORTNESS OF BREATH: ICD-10-CM

## 2022-03-18 DIAGNOSIS — Z12.11 SCREEN FOR COLON CANCER: ICD-10-CM

## 2022-03-18 DIAGNOSIS — I48.0 PAROXYSMAL ATRIAL FIBRILLATION (H): ICD-10-CM

## 2022-03-18 PROCEDURE — G0439 PPPS, SUBSEQ VISIT: HCPCS | Performed by: INTERNAL MEDICINE

## 2022-03-18 ASSESSMENT — ENCOUNTER SYMPTOMS
FEVER: 0
HEMATURIA: 0
HEADACHES: 0
DYSURIA: 0
HEMATOCHEZIA: 0
ARTHRALGIAS: 0
FREQUENCY: 0
NAUSEA: 0
PARESTHESIAS: 0
PALPITATIONS: 0
JOINT SWELLING: 0
SORE THROAT: 0
SHORTNESS OF BREATH: 1
DIARRHEA: 0
WEAKNESS: 0
MYALGIAS: 0
CONSTIPATION: 0
COUGH: 0
EYE PAIN: 0
CHILLS: 0
ABDOMINAL PAIN: 0
HEARTBURN: 0
DIZZINESS: 1
NERVOUS/ANXIOUS: 1

## 2022-03-18 ASSESSMENT — ACTIVITIES OF DAILY LIVING (ADL): CURRENT_FUNCTION: NO ASSISTANCE NEEDED

## 2022-03-18 NOTE — PROGRESS NOTES
"SUBJECTIVE:   Keith Kevin is a 71 year old male who presents for Preventive Visit.    Are you in the first 12 months of your Medicare coverage?  No    Healthy Habits:     In general, how would you rate your overall health?  Good    Frequency of exercise:  6-7 days/week    Duration of exercise:  Greater than 60 minutes    Do you usually eat at least 4 servings of fruit and vegetables a day, include whole grains    & fiber and avoid regularly eating high fat or \"junk\" foods?  Yes    Taking medications regularly:  No    Barriers to taking medications:  None    Medication side effects:  None    Ability to successfully perform activities of daily living:  No assistance needed    Home Safety:  Lack of grab bars in the bathroom    Hearing Impairment:  Difficulty following a conversation in a noisy restaurant or crowded room, feel that people are mumbling or not speaking clearly, difficulty following dialogue in the theater, difficult to understand a speaker at a public meeting or Yazidism service, need to ask people to speak up or repeat themselves, find that men's voices are easier to understand than woman's, difficulty understanding soft or whispered speech and difficulty understanding speech on the telephone    In the past 6 months, have you been bothered by leaking of urine?  No    In general, how would you rate your overall mental or emotional health?  Excellent      PHQ-2 Total Score: 0    Additional concerns today:  Yes    Do you feel safe in your environment? Yes    Have you ever done Advance Care Planning? (For example, a Health Directive, POLST, or a discussion with a medical provider or your loved ones about your wishes): Yes, advance care planning is on file.    Atrial fibrillation. Onset last year.  Taking flecainide, started by cardiology.    Had a COVID infection last fall.  Having ongoing intermittent symptoms.   Will have a sudden feeling of being warm, internal vibration. Sx will last for minutes, " then fox. Will feel tired the remainder of that day.  Some days feels well, some days feels poorly through the day.     Is continuing to experience shortness of breath.  Unclear if related to COVID vs flecainide.     He will f/u with Dr. Dubon to see if medication changes are possible for his a-fib.     Not anticoagulated since he has symptoms when he goes into a-fib and is not in a-fib the vast majority of the time.      He is reluctant to have another colonoscopy as this was the onset of a-fib. Is willing to do Cologuard.     Fall risk  Fallen 2 or more times in the past year?: No  Any fall with injury in the past year?: No    Cognitive Screening   1) Repeat 3 items (Leader, Season, Table)    2) Clock draw: NORMAL  3) 3 item recall: Recalls 3 objects  Results: 3 items recalled: COGNITIVE IMPAIRMENT LESS LIKELY    Mini-CogTM Copyright [employer]. Licensed by the author for use in [facility]; reprinted with permission ([email or web address]). All rights reserved.      Do you have sleep apnea, excessive snoring or daytime drowsiness?: no    Social History     Tobacco Use     Smoking status: Never Smoker     Smokeless tobacco: Never Used   Substance Use Topics     Alcohol use: Yes     Alcohol/week: 0.0 standard drinks     Comment: 1 drink per week     If you drink alcohol do you typically have >3 drinks per day or >7 drinks per week? No    Alcohol Use 3/18/2022   Prescreen: >3 drinks/day or >7 drinks/week? -   Prescreen: >3 drinks/day or >7 drinks/week? No       Current providers sharing in care for this patient include:   Patient Care Team:  Abram Villanueva MD as PCP - General  Abram Villanueva MD as Assigned PCP  Francisco Dubon MD as Assigned Heart and Vascular Provider    The following health maintenance items are reviewed in Epic and correct as of today:  Health Maintenance Due   Topic Date Due     ANNUAL REVIEW OF  ORDERS  Never done     ZOSTER IMMUNIZATION (1 of 2) Never done     FALL RISK ASSESSMENT  02/28/2021  "    COLORECTAL CANCER SCREENING  04/18/2021           Review of Systems   Constitutional: Negative for chills and fever.   HENT: Positive for hearing loss. Negative for congestion, ear pain and sore throat.    Eyes: Negative for pain and visual disturbance.   Respiratory: Positive for shortness of breath. Negative for cough.    Cardiovascular: Negative for chest pain, palpitations and peripheral edema.   Gastrointestinal: Negative for abdominal pain, constipation, diarrhea, heartburn, hematochezia and nausea.   Genitourinary: Positive for impotence. Negative for dysuria, frequency, hematuria, penile discharge and urgency.   Musculoskeletal: Negative for arthralgias, joint swelling and myalgias.   Skin: Negative for rash.   Neurological: Positive for dizziness. Negative for weakness, headaches and paresthesias.   Psychiatric/Behavioral: Negative for mood changes. The patient is nervous/anxious.          OBJECTIVE:   /58   Pulse 76   Temp 98.2  F (36.8  C)   Resp 16   Ht 1.791 m (5' 10.5\")   Wt 81.6 kg (180 lb)   SpO2 99%   BMI 25.46 kg/m   Estimated body mass index is 25.46 kg/m  as calculated from the following:    Height as of this encounter: 1.791 m (5' 10.5\").    Weight as of this encounter: 81.6 kg (180 lb).  Physical Exam  GENERAL: healthy, alert and no distress  EYES: PERRL, EOMI  HENT: ear canals and TM's normal  NECK: no adenopathy  RESP: lungs clear to auscultation - no rales, rhonchi or wheezes  CV: regular rate and rhythm, normal S1 S2, no murmur, no peripheral edema and peripheral pulses strong  ABDOMEN: soft, nontender, bowel sounds normal  MS: no gross musculoskeletal defects noted, no edema  SKIN: no suspicious lesions or rashes  NEURO: Normal strength and tone  PSYCH: mentation appears normal, affect normal/bright         ASSESSMENT / PLAN:       ICD-10-CM    1. Routine general medical examination at a health care facility  Z00.00    2. Paroxysmal atrial fibrillation (H)  I48.0    3. " "Shortness of breath  R06.02    4. Screen for colon cancer  Z12.11 COLOGUARD([employer])     SOB - unclear etiology. May be related to prior COVID infection. Cannot r/o flecainide. He will reach out to Dr. Dubon.     COUNSELING:  Reviewed preventive health counseling, as reflected in patient instructions    Estimated body mass index is 25.46 kg/m  as calculated from the following:    Height as of this encounter: 1.791 m (5' 10.5\").    Weight as of this encounter: 81.6 kg (180 lb).        He reports that he has never smoked. He has never used smokeless tobacco.      Appropriate preventive services were discussed with this patient, including applicable screening as appropriate for cardiovascular disease, diabetes, osteopenia/osteoporosis, and glaucoma.  As appropriate for age/gender, discussed screening for colorectal cancer, prostate cancer. Checklist reviewing preventive services available has been given to the patient.    Reviewed patients plan of care and provided an AVS. The Basic Care Plan (routine screening as documented in Health Maintenance) for Keith meets the Care Plan requirement. This Care Plan has been established and reviewed with the Patient.    Counseling Resources:  ATP IV Guidelines  Pooled Cohorts Equation Calculator  Breast Cancer Risk Calculator  Breast Cancer: Medication to Reduce Risk  FRAX Risk Assessment  ICSI Preventive Guidelines  Dietary Guidelines for Americans, 2010  USDA's MyPlate  ASA Prophylaxis  Lung CA Screening    Abram Villanueva MD  [facility]    Identified Health Risks:  "

## 2022-04-11 LAB — COLOGUARD-ABSTRACT: NEGATIVE

## 2022-05-12 ENCOUNTER — MYC MEDICAL ADVICE (OUTPATIENT)
Dept: CARDIOLOGY | Facility: CLINIC | Age: 72
End: 2022-05-12
Payer: COMMERCIAL

## 2022-07-12 ENCOUNTER — HOSPITAL ENCOUNTER (OUTPATIENT)
Dept: CARDIOLOGY | Facility: CLINIC | Age: 72
Discharge: HOME | End: 2022-07-12
Attending: INTERNAL MEDICINE | Admitting: INTERNAL MEDICINE
Payer: COMMERCIAL

## 2022-07-12 DIAGNOSIS — I35.1 AORTIC VALVE INSUFFICIENCY, ETIOLOGY OF CARDIAC VALVE DISEASE UNSPECIFIED: ICD-10-CM

## 2022-07-12 LAB — LVEF ECHO: NORMAL

## 2022-07-12 PROCEDURE — 93306 TTE W/DOPPLER COMPLETE: CPT | Mod: 26 | Performed by: INTERNAL MEDICINE

## 2022-07-12 PROCEDURE — 93306 TTE W/DOPPLER COMPLETE: CPT

## 2022-08-15 DIAGNOSIS — F43.0 ACUTE REACTION TO STRESS: Primary | ICD-10-CM

## 2022-08-15 RX ORDER — PROPRANOLOL HYDROCHLORIDE 10 MG/1
10 TABLET ORAL 3 TIMES DAILY PRN
Qty: 30 TABLET | Refills: 5 | Status: SHIPPED | OUTPATIENT
Start: 2022-08-15 | End: 2023-10-20

## 2022-08-16 DIAGNOSIS — I48.0 PAROXYSMAL ATRIAL FIBRILLATION (H): ICD-10-CM

## 2022-08-16 RX ORDER — FLECAINIDE ACETATE 100 MG/1
100 TABLET ORAL 2 TIMES DAILY
Qty: 180 TABLET | Refills: 0 | Status: SHIPPED | OUTPATIENT
Start: 2022-08-16 | End: 2022-10-18

## 2022-08-18 ENCOUNTER — OFFICE VISIT (OUTPATIENT)
Dept: PODIATRY | Facility: CLINIC | Age: 72
End: 2022-08-18
Payer: COMMERCIAL

## 2022-08-18 VITALS
WEIGHT: 181.8 LBS | HEIGHT: 71 IN | BODY MASS INDEX: 25.45 KG/M2 | SYSTOLIC BLOOD PRESSURE: 116 MMHG | DIASTOLIC BLOOD PRESSURE: 62 MMHG

## 2022-08-18 DIAGNOSIS — M72.2 PLANTAR FASCIITIS: Primary | ICD-10-CM

## 2022-08-18 PROCEDURE — 99203 OFFICE O/P NEW LOW 30 MIN: CPT | Performed by: PODIATRIST

## 2022-08-18 NOTE — LETTER
"    8/18/2022         RE: Keith Kevin  [address] [place] [address]        Dear Colleague,    Thank you for referring your patient, Keith Kevin, to the [facility]. Please see a copy of my visit note below.    Foot & Ankle Surgery  August 18, 2022    CC: \"Heel pain\"    I was asked to see Keith Kevin regarding the chief complaint by: Self    HPI:  Pt is a 72 year old male who presents with above complaint.  Right heel pain x6 weeks.  He states this started after wearing an old pair of shoes on his walk.  No specific injury noted.  He points to the posterior plantar right heel.  Pain 8 out of 10 \"daily\", worse with \"standing up or driving\".  He got a heel insert from [employer] that has helped a bit but insufficiently    ROS:   Pos for CC.  The patient denies current nausea, vomiting, chills, fevers, belly pain, calf pain, chest pain or SOB.  Complete remainder of ROS is otherwise neg.    VITALS:    Vitals:    08/18/22 0848   BP: 116/62   Weight: 82.5 kg (181 lb 12.8 oz)   Height: 1.791 m (5' 10.5\")       PMH:    Past Medical History:   Diagnosis Date     Aortic regurgitation     moderate     New onset atrial fibrillation (H)      Varicose veins of lower extremities with inflammation        SXHX:    Past Surgical History:   Procedure Laterality Date     APPENDECTOMY  Age 20     DAVINCI HERNIORRHAPHY INGUINAL Left 8/18/2020    Procedure: ROBOTIC ASSISTED LEFT INGUINAL HERNIA REPAIR WITH MESH;  Surgeon: Keshav Mehta MD;  Location: [facility] OR     LIGATE VEIN      Varicose veins, several times     TONSILLECTOMY & ADENOIDECTOMY  Approx age 10     [facility] LAP,HERNIA REPAIR PROC,UNLIST  2002     [facility] NONSPECIFIC PROCEDURE  2001    vein stripping        MEDS:    Current Outpatient Medications   Medication     calcium carbonate (TUMS) 500 MG chewable tablet     Calcium-Magnesium-Zinc (BL CALCIUM-MAGNESIUM-ZINC) 333-133-5 MG TABS     Cholecalciferol (VITAMIN D) 1000 UNIT capsule "     FISH OIL 1200 MG OR CAPS     flecainide (TAMBOCOR) 100 MG tablet     GLUCOSAMINE 500 MG OR CAPS     hydrOXYzine (ATARAX) 25 MG tablet     propranolol (INDERAL) 10 MG tablet     sildenafil (VIAGRA) 100 MG tablet     No current facility-administered medications for this visit.       ALL:     Allergies   Allergen Reactions     No Known Drug Allergies        FMH:    Family History   Problem Relation Age of Onset     Eye Disorder Mother         glaucoma     Cancer Sister         breast Ca, diagnosed age 49     Cardiovascular Father         Abdominal aortic aneurysm     C.A.D. No family hx of      Cancer - colorectal No family hx of      Prostate Cancer No family hx of        SocHx:    Social History     Socioeconomic History     Marital status:      Spouse name: Mary     Number of children: 2     Years of education: Not on file     Highest education level: Not on file   Occupational History     Employer: RETIRED     Comment: Worked as a pharmacist   Tobacco Use     Smoking status: Never Smoker     Smokeless tobacco: Never Used   Substance and Sexual Activity     Alcohol use: Yes     Alcohol/week: 0.0 standard drinks     Comment: 1 drink per week     Drug use: No     Sexual activity: Yes     Partners: Female     Birth control/protection: None   Other Topics Concern      Service Not Asked     Blood Transfusions Not Asked     Caffeine Concern Not Asked     Occupational Exposure Not Asked     Hobby Hazards Not Asked     Sleep Concern Not Asked     Stress Concern Not Asked     Weight Concern Not Asked     Special Diet Not Asked     Back Care No     Exercise Yes     Comment: daily walk and ride bike     Bike Helmet Yes     Seat Belt Yes     Self-Exams Not Asked     Parent/sibling w/ CABG, MI or angioplasty before 65F 55M? Not Asked   Social History Narrative     Not on file     Social Determinants of Health     Financial Resource Strain: Low Risk      Difficulty of Paying Living Expenses: Not hard at all    Food Insecurity: No Food Insecurity     Worried About Running Out of Food in the Last Year: Never true     Ran Out of Food in the Last Year: Never true   Transportation Needs: No Transportation Needs     Lack of Transportation (Medical): No     Lack of Transportation (Non-Medical): No   Physical Activity: Sufficiently Active     Days of Exercise per Week: 7 days     Minutes of Exercise per Session: 150+ min   Stress: No Stress Concern Present     Feeling of Stress : Only a little   Social Connections: Socially Integrated     Frequency of Communication with Friends and Family: Once a week     Frequency of Social Gatherings with Friends and Family: Three times a week     Attends Congregational Services: More than 4 times per year     Active Member of Clubs or Organizations: Yes     Attends Club or Organization Meetings: Not on file     Marital Status:    Intimate Partner Violence: Not on file   Housing Stability: Unknown     Unable to Pay for Housing in the Last Year: No     Number of Places Lived in the Last Year: Not on file     Unstable Housing in the Last Year: No           EXAMINATION:  Gen:   No apparent distress  Neuro:   A&Ox3, no deficits  Psych:    Answering questions appropriately for age and situation with normal affect  Head:    NCAT  Eye:    Visual scanning without deficit  Ear:    Response to auditory stimuli wnl  Lung:    Non-labored breathing on RA noted  Abd:    NTND per patient report  Lymph:    Neg for pitting/non-pitting edema BLE  Vasc:    Pulses palpable, CFT minimally delayed  Neuro:    Light touch sensation intact to all sensory nerve distributions without paresthesias  Derm:    Neg for nodules, lesions or ulcerations  MSK:    Right lower extremity -he is tender at the plantar medial > plantar heel.  No plantar posterior heel pain noted.  No calcaneal tuber pain.  No Achilles tendon pain.  The tibial nerve/branch and Baxters nerve exam are unremarkable.  Calf:    Neg for redness, swelling  or tenderness    Assessment:  72 year old male with right heel pain consistent with plantar fasciitis      Plan:  Discussed etiologies, anatomy and options  1.  Right heel pain consistent with plantar fasciitis  -I personally reviewed and interpreted the patient's lower extremity history pertinent to today's visit, including imaging/labs, in preparation for initiating a treatment program.  -Regarding the heel pain, the Plantar Fasciitis handout was dispensed and discussed.  We talked about stretching, resting/activity modification, icing, NSAID/tylenol use as tolerated, inserts, supportive/comfortable shoes and minimizing shoeless walking.    -discussed Achilles, plantar fascial and hamstring stretches  -OTC insert information dispensed and discussed  -discussed tier 2 options.  He would like to start with the above plan.  Consider tier 2 options should symptoms fail to improve sufficiently    Follow up: 4 weeks or sooner with acute issues      Patient's medical history was reviewed today      Donovan Thibodeaux DPM FACFAS FACFAOM  Podiatric Foot & Ankle Surgeon  [facility]  [phone]    Disclaimer: This note consists of symbols derived from keyboarding, dictation and/or voice recognition software. As a result, there may be errors in the script that have gone undetected. Please consider this when interpreting information found in this chart.            Again, thank you for allowing me to participate in the care of your patient.        Sincerely,        Donovan Thibodeaux DPM, SUMA

## 2022-08-18 NOTE — PROGRESS NOTES
"Foot & Ankle Surgery  August 18, 2022    CC: \"Heel pain\"    I was asked to see Keith Kevin regarding the chief complaint by: Self    HPI:  Pt is a 72 year old male who presents with above complaint.  Right heel pain x6 weeks.  He states this started after wearing an old pair of shoes on his walk.  No specific injury noted.  He points to the posterior plantar right heel.  Pain 8 out of 10 \"daily\", worse with \"standing up or driving\".  He got a heel insert from [employer] that has helped a bit but insufficiently    ROS:   Pos for CC.  The patient denies current nausea, vomiting, chills, fevers, belly pain, calf pain, chest pain or SOB.  Complete remainder of ROS is otherwise neg.    VITALS:    Vitals:    08/18/22 0848   BP: 116/62   Weight: 82.5 kg (181 lb 12.8 oz)   Height: 1.791 m (5' 10.5\")       PMH:    Past Medical History:   Diagnosis Date     Aortic regurgitation     moderate     New onset atrial fibrillation (H)      Varicose veins of lower extremities with inflammation        SXHX:    Past Surgical History:   Procedure Laterality Date     APPENDECTOMY  Age 20     DAVINCI HERNIORRHAPHY INGUINAL Left 8/18/2020    Procedure: ROBOTIC ASSISTED LEFT INGUINAL HERNIA REPAIR WITH MESH;  Surgeon: Keshav Mehta MD;  Location: [facility] OR     LIGATE VEIN      Varicose veins, several times     TONSILLECTOMY & ADENOIDECTOMY  Approx age 10     [facility] LAP,HERNIA REPAIR PROC,UNLIST  2002     [facility] NONSPECIFIC PROCEDURE  2001    vein stripping        MEDS:    Current Outpatient Medications   Medication     calcium carbonate (TUMS) 500 MG chewable tablet     Calcium-Magnesium-Zinc (BL CALCIUM-MAGNESIUM-ZINC) 333-133-5 MG TABS     Cholecalciferol (VITAMIN D) 1000 UNIT capsule     FISH OIL 1200 MG OR CAPS     flecainide (TAMBOCOR) 100 MG tablet     GLUCOSAMINE 500 MG OR CAPS     hydrOXYzine (ATARAX) 25 MG tablet     propranolol (INDERAL) 10 MG tablet     sildenafil (VIAGRA) 100 MG tablet     No current facility-administered " medications for this visit.       ALL:     Allergies   Allergen Reactions     No Known Drug Allergies        FMH:    Family History   Problem Relation Age of Onset     Eye Disorder Mother         glaucoma     Cancer Sister         breast Ca, diagnosed age 49     Cardiovascular Father         Abdominal aortic aneurysm     C.A.D. No family hx of      Cancer - colorectal No family hx of      Prostate Cancer No family hx of        SocHx:    Social History     Socioeconomic History     Marital status:      Spouse name: Mary     Number of children: 2     Years of education: Not on file     Highest education level: Not on file   Occupational History     Employer: RETIRED     Comment: Worked as a pharmacist   Tobacco Use     Smoking status: Never Smoker     Smokeless tobacco: Never Used   Substance and Sexual Activity     Alcohol use: Yes     Alcohol/week: 0.0 standard drinks     Comment: 1 drink per week     Drug use: No     Sexual activity: Yes     Partners: Female     Birth control/protection: None   Other Topics Concern      Service Not Asked     Blood Transfusions Not Asked     Caffeine Concern Not Asked     Occupational Exposure Not Asked     Hobby Hazards Not Asked     Sleep Concern Not Asked     Stress Concern Not Asked     Weight Concern Not Asked     Special Diet Not Asked     Back Care No     Exercise Yes     Comment: daily walk and ride bike     Bike Helmet Yes     Seat Belt Yes     Self-Exams Not Asked     Parent/sibling w/ CABG, MI or angioplasty before 65F 55M? Not Asked   Social History Narrative     Not on file     Social Determinants of Health     Financial Resource Strain: Low Risk      Difficulty of Paying Living Expenses: Not hard at all   Food Insecurity: No Food Insecurity     Worried About Running Out of Food in the Last Year: Never true     Ran Out of Food in the Last Year: Never true   Transportation Needs: No Transportation Needs     Lack of Transportation (Medical): No     Lack  of Transportation (Non-Medical): No   Physical Activity: Sufficiently Active     Days of Exercise per Week: 7 days     Minutes of Exercise per Session: 150+ min   Stress: No Stress Concern Present     Feeling of Stress : Only a little   Social Connections: Socially Integrated     Frequency of Communication with Friends and Family: Once a week     Frequency of Social Gatherings with Friends and Family: Three times a week     Attends Orthodox Services: More than 4 times per year     Active Member of Clubs or Organizations: Yes     Attends Club or Organization Meetings: Not on file     Marital Status:    Intimate Partner Violence: Not on file   Housing Stability: Unknown     Unable to Pay for Housing in the Last Year: No     Number of Places Lived in the Last Year: Not on file     Unstable Housing in the Last Year: No           EXAMINATION:  Gen:   No apparent distress  Neuro:   A&Ox3, no deficits  Psych:    Answering questions appropriately for age and situation with normal affect  Head:    NCAT  Eye:    Visual scanning without deficit  Ear:    Response to auditory stimuli wnl  Lung:    Non-labored breathing on RA noted  Abd:    NTND per patient report  Lymph:    Neg for pitting/non-pitting edema BLE  Vasc:    Pulses palpable, CFT minimally delayed  Neuro:    Light touch sensation intact to all sensory nerve distributions without paresthesias  Derm:    Neg for nodules, lesions or ulcerations  MSK:    Right lower extremity -he is tender at the plantar medial > plantar heel.  No plantar posterior heel pain noted.  No calcaneal tuber pain.  No Achilles tendon pain.  The tibial nerve/branch and Baxters nerve exam are unremarkable.  Calf:    Neg for redness, swelling or tenderness    Assessment:  72 year old male with right heel pain consistent with plantar fasciitis      Plan:  Discussed etiologies, anatomy and options  1.  Right heel pain consistent with plantar fasciitis  -I personally reviewed and interpreted  the patient's lower extremity history pertinent to today's visit, including imaging/labs, in preparation for initiating a treatment program.  -Regarding the heel pain, the Plantar Fasciitis handout was dispensed and discussed.  We talked about stretching, resting/activity modification, icing, NSAID/tylenol use as tolerated, inserts, supportive/comfortable shoes and minimizing shoeless walking.    -discussed Achilles, plantar fascial and hamstring stretches  -OTC insert information dispensed and discussed  -discussed tier 2 options.  He would like to start with the above plan.  Consider tier 2 options should symptoms fail to improve sufficiently    Follow up: 4 weeks or sooner with acute issues      Patient's medical history was reviewed today      Donovan Thibodeaux DPM FACFAS FACFAOM  Podiatric Foot & Ankle Surgeon  [facility]  [phone]    Disclaimer: This note consists of symbols derived from keyboarding, dictation and/or voice recognition software. As a result, there may be errors in the script that have gone undetected. Please consider this when interpreting information found in this chart.

## 2022-10-17 DIAGNOSIS — F43.0 ACUTE REACTION TO STRESS: ICD-10-CM

## 2022-10-18 ENCOUNTER — OFFICE VISIT (OUTPATIENT)
Dept: CARDIOLOGY | Facility: CLINIC | Age: 72
End: 2022-10-18
Payer: COMMERCIAL

## 2022-10-18 VITALS
HEART RATE: 68 BPM | WEIGHT: 185.5 LBS | DIASTOLIC BLOOD PRESSURE: 65 MMHG | HEIGHT: 71 IN | BODY MASS INDEX: 25.97 KG/M2 | SYSTOLIC BLOOD PRESSURE: 144 MMHG

## 2022-10-18 DIAGNOSIS — I48.0 PAROXYSMAL ATRIAL FIBRILLATION (H): Primary | ICD-10-CM

## 2022-10-18 DIAGNOSIS — I35.1 AORTIC VALVE INSUFFICIENCY, ETIOLOGY OF CARDIAC VALVE DISEASE UNSPECIFIED: ICD-10-CM

## 2022-10-18 PROCEDURE — 93000 ELECTROCARDIOGRAM COMPLETE: CPT | Performed by: INTERNAL MEDICINE

## 2022-10-18 PROCEDURE — 99214 OFFICE O/P EST MOD 30 MIN: CPT | Performed by: INTERNAL MEDICINE

## 2022-10-18 RX ORDER — FLECAINIDE ACETATE 150 MG/1
150 TABLET ORAL 2 TIMES DAILY
Qty: 180 TABLET | Refills: 3 | Status: SHIPPED | OUTPATIENT
Start: 2022-10-18 | End: 2023-01-16

## 2022-10-18 NOTE — PROGRESS NOTES
HPI and Plan:   See dictation        Orders Placed This Encounter   Procedures     Follow-Up with Cardiology - BHAKTI     EKG 12-lead complete w/read - Clinics (performed today)     EKG 12-lead complete w/read (Future)- to be scheduled     Echocardiogram Complete       Orders Placed This Encounter   Medications     flecainide (TAMBOCOR) 150 MG tablet     Sig: Take 1 tablet (150 mg) by mouth 2 times daily     Dispense:  180 tablet     Refill:  3     apixaban ANTICOAGULANT (ELIQUIS ANTICOAGULANT) 5 MG tablet     Sig: Take 1 tablet (5 mg) by mouth 2 times daily     Dispense:  180 tablet     Refill:  3       Medications Discontinued During This Encounter   Medication Reason     flecainide (TAMBOCOR) 100 MG tablet Reorder         Encounter Diagnoses   Name Primary?     Paroxysmal atrial fibrillation (H) Yes     Aortic valve insufficiency, etiology of cardiac valve disease unspecified        CURRENT MEDICATIONS:  Current Outpatient Medications   Medication Sig Dispense Refill     apixaban ANTICOAGULANT (ELIQUIS ANTICOAGULANT) 5 MG tablet Take 1 tablet (5 mg) by mouth 2 times daily 180 tablet 3     calcium carbonate (TUMS) 500 MG chewable tablet Take 1 tablet by mouth daily. 100 tablet 3     Calcium-Magnesium-Zinc 333-133-5 MG TABS per tablet Take by mouth daily        Cholecalciferol (VITAMIN D) 1000 UNIT capsule Take 1 capsule by mouth daily. 100 capsule 12     FISH OIL 1200 MG OR CAPS Take by mouth daily        flecainide (TAMBOCOR) 150 MG tablet Take 1 tablet (150 mg) by mouth 2 times daily 180 tablet 3     GLUCOSAMINE 500 MG OR CAPS 1500 QD  0     hydrOXYzine (ATARAX) 25 MG tablet Take 1 tablet (25 mg) by mouth 3 times daily as needed for anxiety 30 tablet 2     propranolol (INDERAL) 10 MG tablet Take 1 tablet (10 mg) by mouth 3 times daily as needed (anxiety) 30 tablet 5     sildenafil (VIAGRA) 100 MG tablet TAKE ONE TABLET BY MOUTH DAILY AS NEEDED FOR ED (DO NOT TAKE WITH NITROGLYCERIN, TERAZOSIN OR DOXAZOSIN) 6 tablet  11       ALLERGIES     Allergies   Allergen Reactions     No Known Drug Allergies        PAST MEDICAL HISTORY:  Past Medical History:   Diagnosis Date     Aortic regurgitation     moderate     New onset atrial fibrillation (H)      Varicose veins of lower extremities with inflammation        PAST SURGICAL HISTORY:  Past Surgical History:   Procedure Laterality Date     APPENDECTOMY  Age 20     DAVINCI HERNIORRHAPHY INGUINAL Left 8/18/2020    Procedure: ROBOTIC ASSISTED LEFT INGUINAL HERNIA REPAIR WITH MESH;  Surgeon: Keshav Mehta MD;  Location: [facility] OR     LIGATE VEIN      Varicose veins, several times     TONSILLECTOMY & ADENOIDECTOMY  Approx age 10     [facility] LAP,HERNIA REPAIR PROC,UNLIST  2002     [facility] NONSPECIFIC PROCEDURE  2001    vein stripping       FAMILY HISTORY:  Family History   Problem Relation Age of Onset     Eye Disorder Mother         glaucoma     Cancer Sister         breast Ca, diagnosed age 49     Cardiovascular Father         Abdominal aortic aneurysm     C.A.D. No family hx of      Cancer - colorectal No family hx of      Prostate Cancer No family hx of        SOCIAL HISTORY:  Social History     Socioeconomic History     Marital status:      Spouse name: Mary     Number of children: 2   Occupational History     Employer: RETIRED     Comment: Worked as a pharmacist   Tobacco Use     Smoking status: Never     Smokeless tobacco: Never   Substance and Sexual Activity     Alcohol use: Yes     Alcohol/week: 0.0 standard drinks     Comment: 1 drink per week     Drug use: No     Sexual activity: Yes     Partners: Female     Birth control/protection: None   Other Topics Concern     Back Care No     Exercise Yes     Comment: daily walk and ride bike     Bike Helmet Yes     Seat Belt Yes     Social Determinants of Health     Financial Resource Strain: Low Risk      Difficulty of Paying Living Expenses: Not hard at all   Food Insecurity: No Food Insecurity     Worried About Running Out of Food in  "the Last Year: Never true     Ran Out of Food in the Last Year: Never true   Transportation Needs: No Transportation Needs     Lack of Transportation (Medical): No     Lack of Transportation (Non-Medical): No   Physical Activity: Sufficiently Active     Days of Exercise per Week: 7 days     Minutes of Exercise per Session: 150+ min   Stress: No Stress Concern Present     Feeling of Stress : Only a little   Social Connections: Socially Integrated     Frequency of Communication with Friends and Family: Once a week     Frequency of Social Gatherings with Friends and Family: Three times a week     Attends Orthodox Services: More than 4 times per year     Active Member of Clubs or Organizations: Yes     Marital Status:    Housing Stability: Unknown     Unable to Pay for Housing in the Last Year: No     Unstable Housing in the Last Year: No       Review of Systems:  Skin:          Eyes:         ENT:         Respiratory:          Cardiovascular:         Gastroenterology:        Genitourinary:         Musculoskeletal:         Neurologic:         Psychiatric:         Heme/Lymph/Imm:         Endocrine:           Physical Exam:  Vitals: BP (!) 144/65   Pulse 68   Ht 1.803 m (5' 11\")   Wt 84.1 kg (185 lb 8 oz)   BMI 25.87 kg/m      Constitutional:           Skin:             Head:           Eyes:           Lymph:      ENT:           Neck:           Respiratory:            Cardiac:                                                           GI:           Extremities and Muscular Skeletal:                 Neurological:           Psych:           CC  No referring provider defined for this encounter.              "

## 2022-10-18 NOTE — PROGRESS NOTES
Service Date: 10/18/2022    HISTORY OF PRESENT ILLNESS:  I saw Mr. Kevin for followup of atrial fibrillation.  He is a 72-year-old white male who had atrial fibrillation before colonoscopy last year.  When I saw him in August of last year, he believed that his atrial fibrillation was secondary to the preparation for colonoscopy.  Subsequently, he had recurrent atrial fibrillation and was indeed somewhat symptomatic.  For that reason, he was put on flecainide 100 mg p.o. b.i.d.  Unfortunately, he continues to have recurrent atrial fibrillation with duration up to a couple of days with most episodes in a few hours.  He feels palpitation with intermittent fast heartbeat, but has not had other severe symptoms.    PHYSICAL EXAMINATION:    VITAL SIGNS:  Blood pressure was 144/65, heart rate 68 beats per minute, body weight 185 pounds.  HEENT:  The eyes and ENT were unremarkable.  LUNGS:  Clear.  CARDIAC:  Rhythm was regular and heart sounds were normal without murmur.  ABDOMEN:  No hepatomegaly.  EXTREMITIES:  There is no pedal edema.    EKG today showed normal sinus rhythm at 62 beats per minute with QRS duration 98 milliseconds.    ASSESSMENT AND RECOMMENDATIONS:  Mr. Kevin continues to have atrial fibrillation, on flecainide 100 mg p.o. b.i.d.  I have increased the dosage to 150 mg p.o. b.i.d.  In addition, because of the long duration of atrial fibrillation, I have recommended anticoagulation.  He will start Eliquis 5 mg p.o. b.i.d.  If the copay is too high for him, he may change to a different anticoagulant that is affordable.  He will return for Cardiology followup in a year with a repeat echo for aortic regurgitation and an EKG.  The LV size and LV ejection fraction are normal at the present time and the aortic regurgitation is considered moderate.    Francisco Dubon MD     cc:  [place] [address]    Francisco Dubon MD        D: 10/18/2022   T:  10/18/2022   MT: MICHAEL    Name:     EYAD BARRAGAN  MRN:      [id number]        Account:      [id number]   :      1950           Service Date: 10/18/2022       Document: [id number]

## 2022-10-18 NOTE — LETTER
10/18/2022    Abram Villanueva MD  [address] [place] [address]    RE: Keith Kevin       Dear Colleague,     I had the pleasure of seeing Keith Kevin in the [facility].  HPI and Plan:   See dictation        Orders Placed This Encounter   Procedures     Follow-Up with Cardiology - BHAKTI     EKG 12-lead complete w/read - Clinics (performed today)     EKG 12-lead complete w/read (Future)- to be scheduled     Echocardiogram Complete       Orders Placed This Encounter   Medications     flecainide (TAMBOCOR) 150 MG tablet     Sig: Take 1 tablet (150 mg) by mouth 2 times daily     Dispense:  180 tablet     Refill:  3     apixaban ANTICOAGULANT (ELIQUIS ANTICOAGULANT) 5 MG tablet     Sig: Take 1 tablet (5 mg) by mouth 2 times daily     Dispense:  180 tablet     Refill:  3       Medications Discontinued During This Encounter   Medication Reason     flecainide (TAMBOCOR) 100 MG tablet Reorder         Encounter Diagnoses   Name Primary?     Paroxysmal atrial fibrillation (H) Yes     Aortic valve insufficiency, etiology of cardiac valve disease unspecified        CURRENT MEDICATIONS:  Current Outpatient Medications   Medication Sig Dispense Refill     apixaban ANTICOAGULANT (ELIQUIS ANTICOAGULANT) 5 MG tablet Take 1 tablet (5 mg) by mouth 2 times daily 180 tablet 3     calcium carbonate (TUMS) 500 MG chewable tablet Take 1 tablet by mouth daily. 100 tablet 3     Calcium-Magnesium-Zinc 333-133-5 MG TABS per tablet Take by mouth daily        Cholecalciferol (VITAMIN D) 1000 UNIT capsule Take 1 capsule by mouth daily. 100 capsule 12     FISH OIL 1200 MG OR CAPS Take by mouth daily        flecainide (TAMBOCOR) 150 MG tablet Take 1 tablet (150 mg) by mouth 2 times daily 180 tablet 3     GLUCOSAMINE 500 MG OR CAPS 1500 QD  0     hydrOXYzine (ATARAX) 25 MG tablet Take 1 tablet (25 mg) by mouth 3 times daily as needed for anxiety 30 tablet 2     propranolol (INDERAL) 10 MG tablet Take 1 tablet  (10 mg) by mouth 3 times daily as needed (anxiety) 30 tablet 5     sildenafil (VIAGRA) 100 MG tablet TAKE ONE TABLET BY MOUTH DAILY AS NEEDED FOR ED (DO NOT TAKE WITH NITROGLYCERIN, TERAZOSIN OR DOXAZOSIN) 6 tablet 11       ALLERGIES     Allergies   Allergen Reactions     No Known Drug Allergies        PAST MEDICAL HISTORY:  Past Medical History:   Diagnosis Date     Aortic regurgitation     moderate     New onset atrial fibrillation (H)      Varicose veins of lower extremities with inflammation        PAST SURGICAL HISTORY:  Past Surgical History:   Procedure Laterality Date     APPENDECTOMY  Age 20     DAVINCI HERNIORRHAPHY INGUINAL Left 8/18/2020    Procedure: ROBOTIC ASSISTED LEFT INGUINAL HERNIA REPAIR WITH MESH;  Surgeon: Keshav Mehta MD;  Location: [facility] OR     LIGATE VEIN      Varicose veins, several times     TONSILLECTOMY & ADENOIDECTOMY  Approx age 10     Z LAP,HERNIA REPAIR PROC,UNLIST  2002     [facility] NONSPECIFIC PROCEDURE  2001    vein stripping       FAMILY HISTORY:  Family History   Problem Relation Age of Onset     Eye Disorder Mother         glaucoma     Cancer Sister         breast Ca, diagnosed age 49     Cardiovascular Father         Abdominal aortic aneurysm     C.A.D. No family hx of      Cancer - colorectal No family hx of      Prostate Cancer No family hx of        SOCIAL HISTORY:  Social History     Socioeconomic History     Marital status:      Spouse name: Mary     Number of children: 2   Occupational History     Employer: RETIRED     Comment: Worked as a pharmacist   Tobacco Use     Smoking status: Never     Smokeless tobacco: Never   Substance and Sexual Activity     Alcohol use: Yes     Alcohol/week: 0.0 standard drinks     Comment: 1 drink per week     Drug use: No     Sexual activity: Yes     Partners: Female     Birth control/protection: None   Other Topics Concern     Back Care No     Exercise Yes     Comment: daily walk and ride bike     Bike Helmet Yes     Seat Belt  "Yes     Social Determinants of Health     Financial Resource Strain: Low Risk      Difficulty of Paying Living Expenses: Not hard at all   Food Insecurity: No Food Insecurity     Worried About Running Out of Food in the Last Year: Never true     Ran Out of Food in the Last Year: Never true   Transportation Needs: No Transportation Needs     Lack of Transportation (Medical): No     Lack of Transportation (Non-Medical): No   Physical Activity: Sufficiently Active     Days of Exercise per Week: 7 days     Minutes of Exercise per Session: 150+ min   Stress: No Stress Concern Present     Feeling of Stress : Only a little   Social Connections: Socially Integrated     Frequency of Communication with Friends and Family: Once a week     Frequency of Social Gatherings with Friends and Family: Three times a week     Attends [employer] Services: More than 4 times per year     Active Member of Clubs or Organizations: Yes     Marital Status:    Housing Stability: Unknown     Unable to Pay for Housing in the Last Year: No     Unstable Housing in the Last Year: No       Review of Systems:  Skin:          Eyes:         ENT:         Respiratory:          Cardiovascular:         Gastroenterology:        Genitourinary:         Musculoskeletal:         Neurologic:         Psychiatric:         Heme/Lymph/Imm:         Endocrine:           Physical Exam:  Vitals: BP (!) 144/65   Pulse 68   Ht 1.803 m (5' 11\")   Wt 84.1 kg (185 lb 8 oz)   BMI 25.87 kg/m      Constitutional:           Skin:             Head:           Eyes:           Lymph:      ENT:           Neck:           Respiratory:            Cardiac:                                                           GI:           Extremities and Muscular Skeletal:                 Neurological:           Psych:           CC  No referring provider defined for this encounter.                Service Date: 10/18/2022    HISTORY OF PRESENT ILLNESS:  I saw Mr. Kevin for followup of " atrial fibrillation.  He is a 72-year-old white male who had atrial fibrillation before colonoscopy last year.  When I saw him in August of last year, he believed that his atrial fibrillation was secondary to the preparation for colonoscopy.  Subsequently, he had recurrent atrial fibrillation and was indeed somewhat symptomatic.  For that reason, he was put on flecainide 100 mg p.o. b.i.d.  Unfortunately, he continues to have recurrent atrial fibrillation with duration up to a couple of days with most episodes in a few hours.  He feels palpitation with intermittent fast heartbeat, but has not had other severe symptoms.    PHYSICAL EXAMINATION:    VITAL SIGNS:  Blood pressure was 144/65, heart rate 68 beats per minute, body weight 185 pounds.  HEENT:  The eyes and ENT were unremarkable.  LUNGS:  Clear.  CARDIAC:  Rhythm was regular and heart sounds were normal without murmur.  ABDOMEN:  No hepatomegaly.  EXTREMITIES:  There is no pedal edema.    EKG today showed normal sinus rhythm at 62 beats per minute with QRS duration 98 milliseconds.    ASSESSMENT AND RECOMMENDATIONS:  Mr. Barragan continues to have atrial fibrillation, on flecainide 100 mg p.o. b.i.d.  I have increased the dosage to 150 mg p.o. b.i.d.  In addition, because of the long duration of atrial fibrillation, I have recommended anticoagulation.  He will start Eliquis 5 mg p.o. b.i.d.  If the copay is too high for him, he may change to a different anticoagulant that is affordable.  He will return for Cardiology followup in a year with a repeat echo for aortic regurgitation and an EKG.  The LV size and LV ejection fraction are normal at the present time and the aortic regurgitation is considered moderate.    Francisco Dubon MD     cc:  Abram Villanueva MD  [address]  [address]    Francisco Dubon MD        D: 10/18/2022   T: 10/18/2022   MT: MICHAEL    Name:     EYAD BARRAGAN  MRN:      [id number]        Account:       [id number]   :      1950           Service Date: 10/18/2022       Document: [id number]    Thank you for allowing me to participate in the care of your patient.      Sincerely,     Francisco Dubon MD     [facility]  cc:   No referring provider defined for this encounter.

## 2022-10-20 ENCOUNTER — TELEPHONE (OUTPATIENT)
Dept: PEDIATRICS | Facility: CLINIC | Age: 72
End: 2022-10-20

## 2022-10-20 DIAGNOSIS — F43.0 ACUTE REACTION TO STRESS: ICD-10-CM

## 2022-10-20 RX ORDER — HYDROXYZINE HYDROCHLORIDE 25 MG/1
25 TABLET, FILM COATED ORAL 3 TIMES DAILY PRN
Qty: 30 TABLET | Refills: 2 | Status: SHIPPED | OUTPATIENT
Start: 2022-10-20 | End: 2023-11-07

## 2022-10-20 RX ORDER — HYDROXYZINE HYDROCHLORIDE 25 MG/1
25 TABLET, FILM COATED ORAL 3 TIMES DAILY PRN
Qty: 30 TABLET | Refills: 2 | Status: CANCELLED | OUTPATIENT
Start: 2022-10-20

## 2022-10-20 NOTE — TELEPHONE ENCOUNTER
Prior Authorization Retail Medication Request    Medication/Dose: Hydroxyzine 25 mg  ICD code (if different than what is on RX): F430  Previously Tried and Failed:   Rationale:      Insurance Name:  [facility]  Insurance ID:  [id number]      Pharmacy Information (if different than what is on RX)  Name:  [facility]  Phone:  [phone]

## 2022-10-20 NOTE — TELEPHONE ENCOUNTER
Prescription approved per [facility] Refill Protocol.    Madyson Del Angel RN

## 2022-10-21 NOTE — TELEPHONE ENCOUNTER
Central Prior Authorization Team   Phone: [phone]      PA Initiation    Medication: Hydroxyzine 25 mg  Insurance Company: [employer] - Phone [phone] Fax [phone]  Pharmacy Filling the Rx: [facility] - [address]   Filling Pharmacy Phone: [phone]  Filling Pharmacy Fax:    Start Date: 10/21/2022

## 2022-10-21 NOTE — TELEPHONE ENCOUNTER
PRIOR AUTHORIZATION DENIED    Medication: Hydroxyzine 25 mg-DENIED    Denial Date: 10/21/2022    Denial Rational:       Appeal Information:

## 2022-10-24 NOTE — TELEPHONE ENCOUNTER
Please call pt:  Hydroxyzine was denied by his insurance.  He may purchase on his own if he wishes.

## 2022-12-08 DIAGNOSIS — N52.9 ERECTILE DYSFUNCTION, UNSPECIFIED ERECTILE DYSFUNCTION TYPE: ICD-10-CM

## 2022-12-13 RX ORDER — SILDENAFIL 100 MG/1
TABLET, FILM COATED ORAL
Qty: 6 TABLET | Refills: 3 | Status: SHIPPED | OUTPATIENT
Start: 2022-12-13 | End: 2023-06-07

## 2023-01-04 ENCOUNTER — VIRTUAL VISIT (OUTPATIENT)
Dept: SLEEP MEDICINE | Facility: CLINIC | Age: 73
End: 2023-01-04
Payer: COMMERCIAL

## 2023-01-04 DIAGNOSIS — G47.52 RBD (REM BEHAVIORAL DISORDER): Primary | ICD-10-CM

## 2023-01-04 PROCEDURE — 99203 OFFICE O/P NEW LOW 30 MIN: CPT | Mod: 95 | Performed by: PHYSICIAN ASSISTANT

## 2023-01-04 ASSESSMENT — SLEEP AND FATIGUE QUESTIONNAIRES
HOW LIKELY ARE YOU TO NOD OFF OR FALL ASLEEP IN A CAR, WHILE STOPPED FOR A FEW MINUTES IN TRAFFIC: WOULD NEVER DOZE
HOW LIKELY ARE YOU TO NOD OFF OR FALL ASLEEP WHILE SITTING AND READING: WOULD NEVER DOZE
HOW LIKELY ARE YOU TO NOD OFF OR FALL ASLEEP WHILE LYING DOWN TO REST IN THE AFTERNOON WHEN CIRCUMSTANCES PERMIT: WOULD NEVER DOZE
HOW LIKELY ARE YOU TO NOD OFF OR FALL ASLEEP WHILE SITTING QUIETLY AFTER LUNCH WITHOUT ALCOHOL: WOULD NEVER DOZE
HOW LIKELY ARE YOU TO NOD OFF OR FALL ASLEEP WHILE SITTING INACTIVE IN A PUBLIC PLACE: WOULD NEVER DOZE
HOW LIKELY ARE YOU TO NOD OFF OR FALL ASLEEP WHEN YOU ARE A PASSENGER IN A CAR FOR AN HOUR WITHOUT A BREAK: WOULD NEVER DOZE
HOW LIKELY ARE YOU TO NOD OFF OR FALL ASLEEP WHILE WATCHING TV: WOULD NEVER DOZE
HOW LIKELY ARE YOU TO NOD OFF OR FALL ASLEEP WHILE SITTING AND TALKING TO SOMEONE: WOULD NEVER DOZE

## 2023-01-04 NOTE — PROGRESS NOTES
Does Keith have a CPAP/Bipap?  No     [place] Sleep Scale: 0  BMI: 25.87    Harshil is a 72 year old who is being evaluated via a billable video visit.      How would you like to obtain your AVS? MyChart  If the video visit is dropped, the invitation should be resent by: Text to cell phone: [phone]  Will anyone else be joining your video visit? No              Subjective   Harshil is a 72 year old, presenting for the following health issues:  Sleep Problem (Kicking and punching during sleep )            Objective           Vitals:  No vitals were obtained today due to virtual visit.    Physical Exam   GENERAL: Healthy, alert and no distress  EYES: Eyes grossly normal to inspection.  No discharge or erythema, or obvious scleral/conjunctival abnormalities.  RESP: No audible wheeze, cough, or visible cyanosis.  No visible retractions or increased work of breathing.    SKIN: Visible skin clear. No significant rash, abnormal pigmentation or lesions.  NEURO: Cranial nerves grossly intact.  Mentation and speech appropriate for age.  PSYCH: Mentation appears normal, affect normal/bright, judgement and insight intact, normal speech and appearance well-groomed.        Video-Visit Details    Type of service:  Video Visit   Video Start Time: 1:08 PM  Video End Time:1:48 PM    Originating Location (pt. Location): Home    Distant Location (provider location):  On-site  Platform used for Video Visit: [facility]    Outpatient Sleep Medicine Consultation:      Name: Keith Kevin MRN# [id number]   Age: 72 year old YOB: 1950     Date of Consultation: January 4, 2023  Consultation is requested by: No referring provider defined for this encounter. No ref. provider found  Primary care provider: Abram Villanueva       Reason for Sleep Consult:     Keith Kevin is sent by No ref. provider found for a sleep consultation regarding REM behavior disorder. He has been punching/kicking at night, acting out dreams. He has started  melatonin after doing some research, which has suppressed the behaviors.     Patient s Reason for visit  Keith Kevin main reason for visit: Kicking and punching during sleep  Patient states problem(s) started: Spring 2022  Keith Kevin's goals for this visit: Treatment plan           Assessment and Plan:     Summary Sleep Diagnoses:  REM behavior disorder    Comorbid Diagnoses:  A Fib      Summary Recommendations:  REM behavior disorder- offered slep study for further evaluation, he declines at this time. Advised against caffeine use later in the day. Discussed fragmented sleep can contribute to RBD. Denies symptoms of sleep apnea, but will have his wife pay closer attention to his breathing. He is already taking Melatonin which is effective in suppressing the behaviors. He will contact the sleep center if melatonin is no longer effective.   No orders of the defined types were placed in this encounter.        Summary Counseling:    Sleep Testing Reviewed  Obstructive Sleep Apnea Reviewed  Complications of Untreated Sleep Apnea Reviewed      Medical Decision-making:   Educational materials provided in instructions    Total time spent reviewing medical records, history and physical examination, review of previous testing and interpretation as well as documentation on this date:20 min    CC: No ref. provider found          History of Present Illness:     Past Sleep Evaluations:    SLEEP-WAKE SCHEDULE:     Work/School Days: Patient goes to school/work: No   Usually gets into bed at 10:30 pm  Takes patient about a few minutes to fall asleep  Has trouble falling asleep Rarely nights per week  Wakes up in the middle of the night 3-4 times times.  Wakes up due to Use the bathroom  He has trouble falling back asleep Rarely times a week.   It usually takes Immediate to get back to sleep  Patient is usually up at 7:30am  Uses alarm: Yes    Weekends/Non-work Days/All Other Days:  Usually gets into bed at 10:30 pm    Takes patient about Immediate to fall asleep  Patient is usually up at 7:20  Uses alarm: Yes    Sleep Need  Patient gets  8 1/2 hours sleep on average   Patient thinks he needs about 8 1/2 hours feels right sleep    Keith Kevin prefers to sleep in this position(s): Back;Side   Patient states they do the following activities in bed:      Naps  Patient takes a purposeful nap Never times a week and naps are usually N/A in duration  He feels better after a nap:    He dozes off unintentionally Never days per week  Patient has had a driving accident or near-miss due to sleepiness/drowsiness: No      SLEEP DISRUPTIONS:    Breathing/Snoring  Patient snores:No  Other people complain about his snoring: No  Patient has been told he stops breathing in his sleep:No  He has issues with the following: Getting up to urinate more than once    Movement:  Patient gets pain, discomfort, with an urge to move:  No  It happens when he is resting:     It happens more at night:     Patient has been told he kicks his legs at night:  Yes     Behaviors in Sleep:  Keith Kevin has experienced the following behaviors while sleeping: Kicking or punching;Night terrors (screaming,yelling or acting afraid but not recalling event)  He has experienced sudden muscle weakness during the day: No      Is there anything else you would like your sleep provider to know: No        CAFFEINE AND OTHER SUBSTANCES:    Patient consumes caffeinated beverages per day:  2 - 3 cups/day  Last caffeine use is usually: With dinner around 6 pm  List of any prescribed or over the counter stimulants that patient takes: None  List of any prescribed or over the counter sleep medication patient takes: Melatonin  List of previous sleep medications that patient has tried: None  Patient drinks alcohol to help them sleep: No  Patient drinks alcohol near bedtime: No    Family History:  Patient has a family member been diagnosed with a sleep disorder: No             SCALES:    EPWORTH SLEEPINESS SCALE      Irving Sleepiness Scale ( NELIDA Nieves  1990-1997[facility] [place]/English - Final version - 21 Nov 07 - [facility] [place].) 1/4/2023   Sitting and reading Would never doze   Watching TV Would never doze   Sitting, inactive in a public place (e.g. a theatre or a meeting) Would never doze   As a passenger in a car for an hour without a break Would never doze   Lying down to rest in the afternoon when circumstances permit Would never doze   Sitting and talking to someone Would never doze   Sitting quietly after a lunch without alcohol Would never doze   In a car, while stopped for a few minutes in traffic Would never doze   Irving Score (MC) 0   Irving Score (Sleep) 0         INSOMNIA SEVERITY INDEX (JULIANNE)      Insomnia Severity Index (JULIANNE) 1/4/2023   Difficulty falling asleep 0   Difficulty staying asleep 1   Problems waking up too early 0   How SATISFIED/DISSATISFIED are you with your CURRENT sleep pattern? 2   How NOTICEABLE to others do you think your sleep problem is in terms of impairing the quality of your life? 4   How WORRIED/DISTRESSED are you about your current sleep problem? 3   To what extent do you consider your sleep problem to INTERFERE with your daily functioning (e.g. daytime fatigue, mood, ability to function at work/daily chores, concentration, memory, mood, etc.) CURRENTLY? 0   JULIANNE Total Score 10       Guidelines for Scoring/Interpretation:  Total score categories:  0-7 = No clinically significant insomnia   8-14 = Subthreshold insomnia   15-21 = Clinical insomnia (moderate severity)  22-28 = Clinical insomnia (severe)  Used via courtesy of www.myhealth.va.gov with permission from Zander Ko PhD., [facility]      STOP BANG     STOP BANG Questionnaire (  2008, the American Society of Anesthesiologists, Inc. [employer] 1/4/2023   1. Snoring - Do you snore loudly (louder than talking or loud enough to be heard  "through closed doors)? No   2. Tired - Do you often feel tired, fatigued, or sleepy during daytime? No   3. Observed - Has anyone observed you stop breathing during your sleep? No   4. Blood pressure - Do you have or are you being treated for high blood pressure? No   5. BMI - BMI more than 35 kg/m2? No   6. Age - Age over 50 yr old? Yes   7. Neck circumference - Neck circumference greater than 40 cm? Yes   8. Gender - Gender male? Yes   STOP BANG Score (MC): 2 (Low risk of DIANNA)   B/P Clinic: -   BMI Clinic: -         GAD7    MERLENE-7  9/21/2021   1. Feeling nervous, anxious, or on edge 1   2. Not being able to stop or control worrying 1   3. Worrying too much about different things 1   4. Trouble relaxing 1   5. Being so restless that it is hard to sit still 0   6. Becoming easily annoyed or irritable 0   7. Feeling afraid, as if something awful might happen 0   MERLENE-7 Total Score 4         CAGE-AID    No flowsheet data found.    CAGE-AID reprinted with permission from the [place] [employer] Journal, EMIR Everett. and ULI Potter, \"Conjoint screening questionnaires for alcohol and drug abuse\" [place] Medical Journal 94: 135-140, 1995.      PATIENT HEALTH QUESTIONNAIRE-9 (PHQ - 9)    PHQ-9 ([employer]) 9/21/2021   1.  Little interest or pleasure in doing things 0   2.  Feeling down, depressed, or hopeless 0   3.  Trouble falling or staying asleep, or sleeping too much 0   4.  Feeling tired or having little energy 0   5.  Poor appetite or overeating 0   6.  Feeling bad about yourself 0   7.  Trouble concentrating 0   8.  Moving slowly or restless 0   9.  Suicidal or self-harm thoughts 0   PHQ-9 Total Score 0   1.  Little interest or pleasure in doing things Not at all   2.  Feeling down, depressed, or hopeless Not at all   3.  Trouble falling or staying asleep, or sleeping too much Not at all   4.  Feeling tired or having little energy Not at all   5.  Poor appetite or overeating Not at all   6.  Feeling bad about yourself Not " at all   7.  Trouble concentrating Not at all   8.  Moving slowly or restless Not at all   9.  Suicidal or self-harm thoughts Not at all   PHQ-9 via [facility] TOTAL SCORE-----> 0       Developed by Deven Ayon, Cristina Antunez, Jeff Marquez and colleagues, with an educational too from [employer] No permission required to reproduce, translate, display or distribute.        Allergies:    Allergies   Allergen Reactions     No Known Drug Allergies        Medications:    Current Outpatient Medications   Medication Sig Dispense Refill     apixaban ANTICOAGULANT (ELIQUIS ANTICOAGULANT) 5 MG tablet Take 1 tablet (5 mg) by mouth 2 times daily 180 tablet 3     calcium carbonate (TUMS) 500 MG chewable tablet Take 1 tablet by mouth daily. 100 tablet 3     Calcium-Magnesium-Zinc 333-133-5 MG TABS per tablet Take by mouth daily        Cholecalciferol (VITAMIN D) 1000 UNIT capsule Take 1 capsule by mouth daily. 100 capsule 12     FISH OIL 1200 MG OR CAPS Take by mouth daily        flecainide (TAMBOCOR) 150 MG tablet Take 1 tablet (150 mg) by mouth 2 times daily 180 tablet 3     GLUCOSAMINE 500 MG OR CAPS 1500 QD  0     hydrOXYzine (ATARAX) 25 MG tablet TAKE 1 TABLET (25 MG) BY MOUTH 3 TIMES DAILY AS NEEDED FOR ANXIETY 30 tablet 2     propranolol (INDERAL) 10 MG tablet Take 1 tablet (10 mg) by mouth 3 times daily as needed (anxiety) 30 tablet 5     sildenafil (VIAGRA) 100 MG tablet TAKE ONE TABLET BY MOUTH ONCE DAILY AS NEEDED FOR ERECTYLE DISFUNCTION. DO NOT TAKE WITH NITRO, TERAZOSIN, OR DOXAZOSIN 6 tablet 3       Problem List:  Patient Active Problem List    Diagnosis Date Noted     Left inguinal hernia 07/27/2020     Priority: Medium     Acute reaction to stress 02/25/2013     Priority: Medium     uses propranolol prn       ACP (advance care planning) 02/17/2012     Priority: Medium     Advance Care Planning 6/14/2017: Recommend Code Status in chart and patient's Advance Care Planning documents be reviewed to  ensure alignment with patient's current wishes.  Most recent Advance Care Planning document is a Health Care Directive  dated 8/16/16.  Kirsty Cloud,  Advance Care Planning Liaison  Patient states has Advance Directive and will bring in a copy to clinic. 2/17/2012          External hemorrhoids 02/09/2011     Priority: Medium     Varicose veins of both lower extremities 02/09/2011     Priority: Medium     Hyperlipidemia LDL goal <130 11/17/2008     Priority: Medium        Past Medical/Surgical History:  Past Medical History:   Diagnosis Date     Aortic regurgitation     moderate     New onset atrial fibrillation (H)      Varicose veins of lower extremities with inflammation      Past Surgical History:   Procedure Laterality Date     APPENDECTOMY  Age 20     DAVINCI HERNIORRHAPHY INGUINAL Left 8/18/2020    Procedure: ROBOTIC ASSISTED LEFT INGUINAL HERNIA REPAIR WITH MESH;  Surgeon: Keshav Mehta MD;  Location: [facility] OR     LIGATE VEIN      Varicose veins, several times     TONSILLECTOMY & ADENOIDECTOMY  Approx age 10     ZZC LAP,HERNIA REPAIR PROC,UNLIST  2002     ZZC NONSPECIFIC PROCEDURE  2001    vein stripping       Social History:  Social History     Socioeconomic History     Marital status:      Spouse name: Mary     Number of children: 2     Years of education: Not on file     Highest education level: Not on file   Occupational History     Employer: RETIRED     Comment: Worked as a pharmacist   Tobacco Use     Smoking status: Never     Smokeless tobacco: Never   Substance and Sexual Activity     Alcohol use: Yes     Alcohol/week: 0.0 standard drinks     Comment: 1 drink per week     Drug use: No     Sexual activity: Yes     Partners: Female     Birth control/protection: None   Other Topics Concern      Service Not Asked     Blood Transfusions Not Asked     Caffeine Concern Not Asked     Occupational Exposure Not Asked     Hobby Hazards Not Asked     Sleep Concern Not Asked     Stress  Concern Not Asked     Weight Concern Not Asked     Special Diet Not Asked     Back Care No     Exercise Yes     Comment: daily walk and ride bike     Bike Helmet Yes     Seat Belt Yes     Self-Exams Not Asked     Parent/sibling w/ CABG, MI or angioplasty before 65F 55M? Not Asked   Social History Narrative     Not on file     Social Determinants of Health     Financial Resource Strain: Low Risk      Difficulty of Paying Living Expenses: Not hard at all   Food Insecurity: No Food Insecurity     Worried About Running Out of Food in the Last Year: Never true     Ran Out of Food in the Last Year: Never true   Transportation Needs: No Transportation Needs     Lack of Transportation (Medical): No     Lack of Transportation (Non-Medical): No   Physical Activity: Sufficiently Active     Days of Exercise per Week: 7 days     Minutes of Exercise per Session: 150+ min   Stress: No Stress Concern Present     Feeling of Stress : Only a little   Social Connections: Socially Integrated     Frequency of Communication with Friends and Family: Once a week     Frequency of Social Gatherings with Friends and Family: Three times a week     Attends Hoahaoism Services: More than 4 times per year     Active Member of Clubs or Organizations: Yes     Attends Club or Organization Meetings: Not on file     Marital Status:    Intimate Partner Violence: Not on file   Housing Stability: Unknown     Unable to Pay for Housing in the Last Year: No     Number of Places Lived in the Last Year: Not on file     Unstable Housing in the Last Year: No       Family History:  Family History   Problem Relation Age of Onset     Eye Disorder Mother         glaucoma     Cancer Sister         breast Ca, diagnosed age 49     Cardiovascular Father         Abdominal aortic aneurysm     C.A.D. No family hx of      Cancer - colorectal No family hx of      Prostate Cancer No family hx of        Review of Systems:  A complete review of systems reviewed by me is  negative with the exeption of what has been mentioned in the history of present illness.  In the last TWO WEEKS have you experienced any of the following symptoms?  Fevers: No  Night Sweats: No  Weight Gain: No  Pain at Night: No  Double Vision: No  Changes in Vision: No  Difficulty Breathing through Nose: No  Sore Throat in Morning: No  Dry Mouth in the Morning: No  Shortness of Breath Lying Flat: Yes  Shortness of Breath With Activity: Yes  Awakening with Shortness of Breath: No  Increased Cough: No  Heart Racing at Night: No  Swelling in Feet or Legs: No  Diarrhea at Night: No  Heartburn at Night: No  Urinating More than Once at Night: Yes  Losing Control of Urine at Night: No  Joint Pains at Night: No  Headaches in Morning: No  Weakness in Arms or Legs: No  Depressed Mood: No  Anxiety: No     Physical Examination:  Vitals: There were no vitals taken for this visit.  BMI= There is no height or weight on file to calculate BMI.                Data: All pertinent previous laboratory data reviewed     Recent Labs   Lab Test 07/08/21  1402 02/28/20  1010 02/07/18  1000 02/06/17  0845     --   --  140   POTASSIUM 4.0  --   --  4.2   CHLORIDE 108  --   --  105   CO2 22  --   --  26   ANIONGAP 8  --   --  9   * 86   < > 89   BUN 21  --   --  17   CR 0.97  --   --  0.89   STEPHEN 9.1  --   --  9.0    < > = values in this interval not displayed.       Recent Labs   Lab Test 07/08/21  1402   WBC 7.1   RBC 4.80   HGB 15.7   HCT 46.0   MCV 96   MCH 32.7   MCHC 34.1   RDW 12.9          No results for input(s): PROTTOTAL, ALBUMIN, BILITOTAL, ALKPHOS, AST, ALT, BILIDIRECT in the last 43220 hours.    TSH (mU/L)   Date Value   07/08/2021 1.04       No results found for: UAMP, UBARB, BENZODIAZEUR, UCANN, UCOC, OPIT, UPCP    No results found for: IRONSAT, [id number], MITCHELL    No results found for: PH, PHARTERIAL, PO2, [id number], SAT, PCO2, HCO3, BASEEXCESS, RONALD,  BEB    @LABRCNTIPR(phv:4,pco2v:4,po2v:4,hco3v:4,feliberto:4,o2per:4)@    Echocardiology: No results found for this or any previous visit (from the past 4320 hour(s)).    Chest x-ray: No results found for this or any previous visit from the past 365 days.      Chest CT: No results found for this or any previous visit from the past 365 days.      PFT: Most Recent [facility] Pulmonary Function Testing    No results found for: 20001  No results found for: 20002  No results found for: 20003  No results found for: 20015  No results found for: 20016  No results found for: 20027  No results found for: 20028  No results found for: 20029  No results found for: 20079  No results found for: 20080  No results found for: 20081  No results found for: 20335  No results found for: 20105  No results found for: 20053  No results found for: 20054  No results found for: 20055      Ekaterina Chaudhry CMA 1/4/2023

## 2023-01-04 NOTE — PATIENT INSTRUCTIONS
"  Monitor for signs or symptoms of sleep apnea which can contribute to REM related sleep behaviors. It is also a frequent contributing factor to Atrial Fibrillation. If you have questions or concerns send me a message on [employer].                     MY TREATMENT INFORMATION FOR SLEEP APNEA-  Keith Kevin    DOCTOR : JIM Rebollar-LIZ    Am I having a sleep study at a sleep center?  --->Due to normal delays, you will be contacted within 2-4 weeks to schedule    Am I having a home sleep study?  --->Watch the video for the device you are using:    -/drop off device-   https://www.[employer].com/watch?v=yGGFBdELGhk    -Disposable device sent out require phone/computer application-   https://www.[employer].com/watch?v=BCce_vbiwxE      Frequently asked questions:  1. What is Obstructive Sleep Apnea (DIANNA)? DIANNA is the most common type of sleep apnea. Apnea means, \"without breath.\"  Apnea is most often caused by narrowing or collapse of the upper airway as muscles relax during sleep.   Almost everyone has occasional apneas. Most people with sleep apnea have had brief interruptions at night frequently for many years.  The severity of sleep apnea is related to how frequent and severe the events are.   2. What are the consequences of DIANNA? Symptoms include: feeling sleepy during the day, snoring loudly, gasping or stopping of breathing, trouble sleeping, and occasionally morning headaches or heartburn at night.  Sleepiness can be serious and even increase the risk of falling asleep while driving. Other health consequences may include development of high blood pressure and other cardiovascular disease in persons who are susceptible. Untreated DIANNA  can contribute to heart disease, stroke and diabetes.   3. What are the treatment options? In most situations, sleep apnea is a lifelong disease that must be managed with daily therapy. Medications are not effective for sleep apnea and surgery is generally not considered until " other therapies have been tried. Your treatment is your choice . Continuous Positive Airway (CPAP) works right away and is the therapy that is effective in nearly everyone. An oral device to hold your jaw forward is usually the next most reliable option. Other options include postioning devices (to keep you off your back), weight loss, and surgery including a tongue pacing device. There is more detail about some of these options below.  4. Are my sleep studies covered by insurance? Although we will request verification of coverage, we advise you also check in advance of the study to ensure there is coverage.    Important tips for those choosing CPAP and similar devices   Know your equipment:  CPAP is continuous positive airway pressure that prevents obstructive sleep apnea by keeping the throat from collapsing while you are sleeping. In most cases, the device is  smart  and can slowly self-adjusts if your throat collapses and keeps a record every day of how well you are treated-this information is available to you and your care team.  BPAP is bilevel positive airway pressure that keeps your throat open and also assists each breath with a pressure boost to maintain adequate breathing.  Special kinds of BPAP are used in patients who have inadequate breathing from lung or heart disease. In most cases, the device is  smart  and can slowly self-adjusts to assist breathing. Like CPAP, the device keeps a record of how well you are treated.  Your mask is your connection to the device. You get to choose what feels most comfortable and the staff will help to make sure if fits. Here: are some examples of the different masks that are available:       Key points to remember on your journey with sleep apnea:  Sleep study.  PAP devices often need to be adjusted during a sleep study to show that they are effective and adjusted right.  Good tips to remember: Try wearing just the mask during a quiet time during the day so your body  adapts to wearing it. A humidifier is recommended for comfort in most cases to prevent drying of your nose and throat. Allergy medication from your provider may help you if you are having nasal congestion.  Getting settled-in. It takes more than one night for most of us to get used to wearing a mask. Try wearing just the mask during a quiet time during the day so your body adapts to wearing it. A humidifier is recommended for comfort in most cases. Our team will work with you carefully on the first day and will be in contact within 4 days and again at 2 and 4 weeks for advice and remote device adjustments. Your therapy is evaluated by the device each day.   Use it every night. The more you are able to sleep naturally for 7-8 hours, the more likely you will have good sleep and to prevent health risks or symptoms from sleep apnea. Even if you use it 4 hours it helps. Occasionally all of us are unable to use a medical therapy, in sleep apnea, it is not dangerous to miss one night.   Communicate. Call our skilled team on the number provided on the first day if your visit for problems that make it difficult to wear the device. Over 2 out of 3 patients can learn to wear the device long-term with help from our team. Remember to call our team or your sleep providers if you are unable to wear the device as we may have other solutions for those who cannot adapt to mask CPAP therapy. It is recommended that you sleep your sleep provider within the first 3 months and yearly after that if you are not having problems.   Use it for your health. We encourage use of CPAP masks during daytime quiet periods to allow your face and brain to adapt to the sensation of CPAP so that it will be a more natural sensation to awaken to at night or during naps. This can be very useful during the first few weeks or months of adapting to CPAP though it does not help medically to wear CPAP during wakefulness and  should not be used as a strategy just  to meet guidelines.  Take care of your equipment. Make sure you clean your mask and tubing using directions every day and that your filter and mask are replaced as recommended or if they are not working.     BESIDES CPAP, WHAT OTHER THERAPIES ARE THERE?    Positioning Device  Positioning devices are generally used when sleep apnea is mild and only occurs on your back.This example shows a pillow that straps around the waist. It may be appropriate for those whose sleep study shows milder sleep apnea that occurs primarily when lying flat on one's back. Preliminary studies have shown benefit but effectiveness at home may need to be verified by a home sleep test. These devices are generally not covered by medical insurance.  Examples of devices that maintain sleeping on the back to prevent snoring and mild sleep apnea.    Belt type body positioner  http://[employer]/    Electronic reminder  http://nightshifttherapy.com/            Oral Appliance  What is oral appliance therapy?  An oral appliance device fits on your teeth at night like a retainer used after having braces. The device is made by a specialized dentist and requires several visits over 1-2 months before a manufactured device is made to fit your teeth and is adjusted to prevent your sleep apnea. Once an oral device is working properly, snoring should be improved. A home sleep test may be recommended at that time if to determine whether the sleep apnea is adequately treated.       Some things to remember:  -Oral devices are often, but not always, covered by your medical insurance. Be sure to check with your insurance provider.   -If you are referred for oral therapy, you will be given a list of specialized dentists to consider or you may choose to visit the Web site of the American Academy of Dental Sleep Medicine  -Oral devices are less likely to work if you have severe sleep apnea or are extremely overweight.     More detailed information  An oral appliance  is a small acrylic device that fits over the upper and lower teeth  (similar to a retainer or a mouth guard). This device slightly moves jaw forward, which moves the base of the tongue forward, opens the airway, improves breathing for effective treat snoring and obstructive sleep apnea in perhaps 7 out of 10 people .  The best working devices are custom-made by a dental device  after a mold is made of the teeth 1, 2, 3.  When is an oral appliance indicated?  Oral appliance therapy is recommended as a first-line treatment for patients with primary snoring, mild sleep apnea, and for patients with moderate sleep apnea who prefer appliance therapy to use of CPAP4, 5. Severity of sleep apnea is determined by sleep testing and is based on the number of respiratory events per hour of sleep.   How successful is oral appliance therapy?  The success rate of oral appliance therapy in patients with mild sleep apnea is 75-80% while in patients with moderate sleep apnea it is 50-70%. The chance of success in patients with severe sleep apnea is 40-50%. The research also shows that oral appliances have a beneficial effect on the cardiovascular health of DIANNA patients at the same magnitude as CPAP therapy7.  Oral appliances should be a second-line treatment in cases of severe sleep apnea, but if not completely successful then a combination therapy utilizing CPAP plus oral appliance therapy may be effective. Oral appliances tend to be effective in a broad range of patients although studies show that the patients who have the highest success are females, younger patients, those with milder disease, and less severe obesity. 3, 6.   Finding a dentist that practices dental sleep medicine  Specific training is available through the American Academy of Dental Sleep Medicine for dentists interested in working in the field of sleep. To find a dentist who is educated in the field of sleep and the use of oral appliances, near you,  visit the Web site of the American Academy of Dental Sleep Medicine.    References  1. Jatinder, et al. Objectively measured vs self-reported compliance during oral appliance therapy for sleep-disordered breathing. Chest 2013; 144(5): 7896-9747.  2. Tonya et al. Objective measurement of compliance during oral appliance therapy for sleep-disordered breathing. Thorax 2013; 68(1): 91-96.  3. Gretchen, et al. Mandibular advancement devices in 620 men and women with DIANNA and snoring: tolerability and predictors of treatment success. Chest 2004; 125: 0950-6070.  4. Charity et al. Oral appliances for snoring and DIANNA: a review. Sleep 2006; 29: 244-262.  5. Luis et al. Oral appliance treatment for DIANNA: an update. J Clin Sleep Med 2014; 10(2): 215-227.  6. Bogdan et al. Predictors of OSAH treatment outcome. J Dent Res 2007; 86: 4389-1103.      Weight Loss:    Weight loss is a long-term strategy that may improve sleep apnea in some patients.    Weight management is a personal decision and the decision should be based on your interest and the potential benefits.  If you are interested in exploring weight loss strategies, the following discussion covers the impact on weight loss on sleep apnea and the approaches that may be successful.    Being overweight does not necessarily mean you will have health consequences.  Those who have BMI over 35 or over 27 with existing medical conditions carries greater risk.   Weight loss decreases severity of sleep apnea in most people with obesity. For those with mild obesity who have developed snoring with weight gain, even 15-30 pound weight loss can improve and occasionally eliminate sleep apnea.  Structured and life-long dietary and health habits are necessary to lose weight and keep healthier weight levels.     Though there may be significant health benefits from weight loss, long-term weight loss is very difficult to achieve- studies show success with dietary management  in less than 10% of people. In addition, substantial weight loss may require years of dietary control and may be difficult if patients have severe obesity. In these cases, surgical management may be considered.  Finally, older individuals who have tolerated obesity without health complications may be less likely to benefit from weight loss strategies.      [unfilled]    Surgery:    Surgery for obstructive sleep apnea is considered generally only when other therapies fail to work. Surgery may be discussed with you if you are having a difficult time tolerating CPAP and or when there is an abnormal structure that requires surgical correction.  Nose and throat surgeries often enlarge the airway to prevent collapse.  Most of these surgeries create pain for 1-2 weeks and up to half of the most common surgeries are not effective throughout life.  You should carefully discuss the benefits and drawbacks to surgery with your sleep provider and surgeon to determine if it is the best solution for you.   More information  Surgery for DIANNA is directed at areas that are responsible for narrowing or complete obstruction of the airway during sleep.  There are a wide range of procedures available to enlarge and/or stabilize the airway to prevent blockage of breathing in the three major areas where it can occur: the palate, tongue, and nasal regions.  Successful surgical treatment depends on the accurate identification of the factors responsible for obstructive sleep apnea in each person.  A personalized approach is required because there is no single treatment that works well for everyone.  Because of anatomic variation, consultation with an examination by a sleep surgeon is a critical first step in determining what surgical options are best for each patient.  In some cases, examination during sedation may be recommended in order to guide the selection of procedures.  Patients will be counseled about risks and benefits as well as the  typical recovery course after surgery. Surgery is typically not a cure for a person s DIANNA.  However, surgery will often significantly improve one s DIANNA severity (termed  success rate ).  Even in the absence of a cure, surgery will decrease the cardiovascular risk associated with OSA7; improve overall quality of life8 (sleepiness, functionality, sleep quality, etc).      Palate Procedures:  Patients with DIANNA often have narrowing of their airway in the region of their tonsils and uvula.  The goals of palate procedures are to widen the airway in this region as well as to help the tissues resist collapse.  Modern palate procedure techniques focus on tissue conservation and soft tissue rearrangement, rather than tissue removal.  Often the uvula is preserved in this procedure. Residual sleep apnea is common in patient after pharyngoplasty with an average reduction in sleep apnea events of 33%2.      Tongue Procedures:  ExamWhile patients are awake, the muscles that surround the throat are active and keep this region open for breathing. These muscles relax during sleep, allowing the tongue and other structures to collapse and block breathing.  There are several different tongue procedures available.  Selection of a tongue base procedure depends on characteristics seen on physical exam.  Generally, procedures are aimed at removing bulky tissues in this area or preventing the back of the tongue from falling back during sleep.  Success rates for tongue surgery range from 50-62%3.    Hypoglossal Nerve Stimulation:  Hypoglossal nerve stimulation has recently received approval from the [place] Food and Drug Administration for the treatment of obstructive sleep apnea.  This is based on research showing that the system was safe and effective in treating sleep apnea6.  Results showed that the median AHI score decreased 68%, from 29.3 to 9.0. This therapy uses an implant system that senses breathing patterns and delivers mild  stimulation to airway muscles, which keeps the airway open during sleep.  The system consists of three fully implanted components: a small generator (similar in size to a pacemaker), a breathing sensor, and a stimulation lead.  Using a small handheld remote, a patient turns the therapy on before bed and off upon awakening.    Candidates for this device must be greater than 18 years of age, have moderate to severe DIANNA (AHI between 15-65), BMI less than 35, have tried CPAP/oral appliance for at least 8 weeks without success, and have appropriate upper airway anatomy (determined by a sleep endoscopy performed by Dr. Trey Cobb).    Hypoglossal Nerve Stimulation Pathway:    The sleep surgeon s office will work with the patient through the insurance prior-authorization process (including communications and appeals).    Nasal Procedures:  Nasal obstruction can interfere with nasal breathing during the day and night.  Studies have shown that relief of nasal obstruction can improve the ability of some patients to tolerate positive airway pressure therapy for obstructive sleep apnea1.  Treatment options include medications such as nasal saline, topical corticosteroid and antihistamine sprays, and oral medications such as antihistamines or decongestants. Non-surgical treatments can include external nasal dilators for selected patients. If these are not successful by themselves, surgery can improve the nasal airway either alone or in combination with these other options.      Combination Procedures:  Combination of surgical procedures and other treatments may be recommended, particularly if patients have more than one area of narrowing or persistent positional disease.  The success rate of combination surgery ranges from 66-80%2,3.    References  Breanna HILL. The Role of the Nose in Snoring and Obstructive Sleep Apnoea: An Update.  Eur Arch Otorhinolaryngol. 2011; 268: 1365-73.   Arlyn SM; Jessica QUINTANILLA; Sanjana DELATORRE; Pallanch JF;  Claudia DIETZ; Jerzy SG; Sayda CINTRON. Surgical modifications of the upper airway for obstructive sleep apnea in adults: a systematic review and meta-analysis. SLEEP 2010;33(10):4663-0519. Nasrin BAER. Hypopharyngeal surgery in obstructive sleep apnea: an evidence-based medicine review.  Arch Otolaryngol Head Neck Surg. 2006 Feb;132(2):206-13.  Mario YH1, Oliver Y, Armen MARCUS. The efficacy of anatomically based multilevel surgery for obstructive sleep apnea. Otolaryngol Head Neck Surg. 2003 Oct;129(4):327-35.  Nasrin BAER, Goldberg A. Hypopharyngeal Surgery in Obstructive Sleep Apnea: An Evidence-Based Medicine Review. Arch Otolaryngol Head Neck Surg. 2006 Feb;132(2):206-13.  Elizabeth SUMMERS et al. Upper-Airway Stimulation for Obstructive Sleep Apnea.  N Engl J Med. 2014 Jan 9;370(2):139-49.  Kitty Y et al. Increased Incidence of Cardiovascular Disease in Middle-aged Men with Obstructive Sleep Apnea. Am J Respir Crit Care Med; 2002 166: 159-165  Terry EM et al. Studying Life Effects and Effectiveness of Palatopharyngoplasty (SLEEP) study: Subjective Outcomes of Isolated Uvulopalatopharyngoplasty. Otolaryngol Head Neck Surg. 2011; 144: 623-631.        WHAT IF I ONLY HAVE SNORING?    Mandibular advancement devices, lateral sleep positioning, long-term weight loss and treatment of nasal allergies have been shown to improve snoring.  Exercising tongue muscles with a game ([employer]://apps.[employer]/us/lazarus/soundly-reduce-snoring/mu6626233410) or stimulating the tongue during the day with a device (https://doi.org/10.3390/qfc06082761) have improved snoring in some individuals.    Remember to Drive Safe... Drive Alive     Sleep health profoundly affects your health, mood, and your safety.  Thirty three percent of the population (one in three of us) is not getting enough sleep and many have a sleep disorder. Not getting enough sleep or having an untreated / undertreated sleep condition may make us sleepy without even knowing it. In fact, our  driving could be dramatically impaired due to our sleep health. As your provider, here are some things I would like you to know about driving:     Here are some warning signs for impairment and dangerous drowsy driving:              -Having been awake more than 16 hours               -Looking tired               -Eyelid drooping              -Head nodding (it could be too late at this point)              -Driving for more than 30 minutes     Some things you could do to make the driving safer if you are experiencing some drowsiness:              -Stop driving and rest              -Call for transportation              -Make sure your sleep disorder is adequately treated     Some things that have been shown NOT to work when experiencing drowsiness while driving:              -Turning on the radio              -Opening windows              -Eating any  distracting  /  entertaining  foods (e.g., sunflower seeds, candy, or any other)              -Talking on the phone      Your decision may not only impact your life, but also the life of others. Please, remember to drive safe for yourself and all of us.

## 2023-01-10 DIAGNOSIS — I48.0 PAROXYSMAL ATRIAL FIBRILLATION (H): ICD-10-CM

## 2023-01-16 DIAGNOSIS — I48.0 PAROXYSMAL ATRIAL FIBRILLATION (H): ICD-10-CM

## 2023-01-16 RX ORDER — FLECAINIDE ACETATE 150 MG/1
150 TABLET ORAL 2 TIMES DAILY
Qty: 180 TABLET | Refills: 2 | Status: SHIPPED | OUTPATIENT
Start: 2023-01-16 | End: 2023-09-26

## 2023-02-03 RX ORDER — CLONAZEPAM 0.5 MG/1
0.5 TABLET ORAL
Qty: 30 TABLET | Refills: 1 | Status: SHIPPED | OUTPATIENT
Start: 2023-02-03 | End: 2023-03-31

## 2023-02-21 ENCOUNTER — TELEPHONE (OUTPATIENT)
Dept: SLEEP MEDICINE | Facility: CLINIC | Age: 73
End: 2023-02-21
Payer: COMMERCIAL

## 2023-02-21 NOTE — TELEPHONE ENCOUNTER
Pharmacy calling for approval for early refill of clonazePAM (KLONOPIN) 0.5 MG tablet    Patient is traveling 2/24/23-3/9/23, Requesting to  early fill on 2/23/23.     Last dispensed #30 2/3/23    Terese RAY RN  [facility]

## 2023-02-22 NOTE — TELEPHONE ENCOUNTER
Dustin Nuñez PA-C  You 20 hours ago (12:07 PM)     BD  A early fill is fine.   thanks      Pharmacy notified early fill is approved.     Terese RAY RN  [facility]

## 2023-03-31 RX ORDER — CLONAZEPAM 0.5 MG/1
0.5 TABLET ORAL
Qty: 30 TABLET | Refills: 1 | Status: SHIPPED | OUTPATIENT
Start: 2023-03-31 | End: 2023-06-02

## 2023-03-31 NOTE — TELEPHONE ENCOUNTER
Pending Prescriptions:                       Disp   Refills    clonazePAM (KLONOPIN) 0.5 MG tablet       30 tab*1            Sig: Take 1 tablet (0.5 mg) by mouth nightly as needed           (REM behavior disorder)  LAST FILL DATE: 2/22/2023

## 2023-04-14 ASSESSMENT — ENCOUNTER SYMPTOMS
SHORTNESS OF BREATH: 1
EYE PAIN: 0
COUGH: 0
DIARRHEA: 0
HEMATOCHEZIA: 0
CHILLS: 0
HEMATURIA: 0
DIZZINESS: 1
ABDOMINAL PAIN: 0
CONSTIPATION: 0
NERVOUS/ANXIOUS: 1
FREQUENCY: 1

## 2023-04-14 ASSESSMENT — ACTIVITIES OF DAILY LIVING (ADL): CURRENT_FUNCTION: NO ASSISTANCE NEEDED

## 2023-04-19 ENCOUNTER — OFFICE VISIT (OUTPATIENT)
Dept: PEDIATRICS | Facility: CLINIC | Age: 73
End: 2023-04-19
Payer: COMMERCIAL

## 2023-04-19 VITALS
SYSTOLIC BLOOD PRESSURE: 118 MMHG | BODY MASS INDEX: 26.23 KG/M2 | HEIGHT: 70 IN | HEART RATE: 59 BPM | RESPIRATION RATE: 16 BRPM | TEMPERATURE: 97.6 F | WEIGHT: 183.2 LBS | OXYGEN SATURATION: 98 % | DIASTOLIC BLOOD PRESSURE: 62 MMHG

## 2023-04-19 DIAGNOSIS — Z00.00 ROUTINE GENERAL MEDICAL EXAMINATION AT A HEALTH CARE FACILITY: Primary | ICD-10-CM

## 2023-04-19 DIAGNOSIS — I48.0 PAROXYSMAL ATRIAL FIBRILLATION (H): ICD-10-CM

## 2023-04-19 DIAGNOSIS — Z12.5 SCREENING FOR PROSTATE CANCER: ICD-10-CM

## 2023-04-19 LAB
ANION GAP SERPL CALCULATED.3IONS-SCNC: 12 MMOL/L (ref 7–15)
BUN SERPL-MCNC: 20.5 MG/DL (ref 8–23)
CALCIUM SERPL-MCNC: 9.8 MG/DL (ref 8.8–10.2)
CHLORIDE SERPL-SCNC: 105 MMOL/L (ref 98–107)
CHOLEST SERPL-MCNC: 232 MG/DL
CREAT SERPL-MCNC: 1.01 MG/DL (ref 0.67–1.17)
DEPRECATED HCO3 PLAS-SCNC: 23 MMOL/L (ref 22–29)
ERYTHROCYTE [DISTWIDTH] IN BLOOD BY AUTOMATED COUNT: 12.7 % (ref 10–15)
GFR SERPL CREATININE-BSD FRML MDRD: 79 ML/MIN/1.73M2
GLUCOSE SERPL-MCNC: 93 MG/DL (ref 70–99)
HCT VFR BLD AUTO: 45.6 % (ref 40–53)
HDLC SERPL-MCNC: 62 MG/DL
HGB BLD-MCNC: 15.5 G/DL (ref 13.3–17.7)
LDLC SERPL CALC-MCNC: 157 MG/DL
MCH RBC QN AUTO: 32.6 PG (ref 26.5–33)
MCHC RBC AUTO-ENTMCNC: 34 G/DL (ref 31.5–36.5)
MCV RBC AUTO: 96 FL (ref 78–100)
NONHDLC SERPL-MCNC: 170 MG/DL
PLATELET # BLD AUTO: 245 10E3/UL (ref 150–450)
POTASSIUM SERPL-SCNC: 4.6 MMOL/L (ref 3.4–5.3)
PSA SERPL DL<=0.01 NG/ML-MCNC: 1.97 NG/ML (ref 0–6.5)
RBC # BLD AUTO: 4.75 10E6/UL (ref 4.4–5.9)
SODIUM SERPL-SCNC: 140 MMOL/L (ref 136–145)
TRIGL SERPL-MCNC: 67 MG/DL
TSH SERPL DL<=0.005 MIU/L-ACNC: 1.45 UIU/ML (ref 0.3–4.2)
WBC # BLD AUTO: 4.5 10E3/UL (ref 4–11)

## 2023-04-19 PROCEDURE — 84443 ASSAY THYROID STIM HORMONE: CPT | Performed by: INTERNAL MEDICINE

## 2023-04-19 PROCEDURE — G0103 PSA SCREENING: HCPCS | Performed by: INTERNAL MEDICINE

## 2023-04-19 PROCEDURE — 36415 COLL VENOUS BLD VENIPUNCTURE: CPT | Performed by: INTERNAL MEDICINE

## 2023-04-19 PROCEDURE — G0439 PPPS, SUBSEQ VISIT: HCPCS | Performed by: INTERNAL MEDICINE

## 2023-04-19 PROCEDURE — 80048 BASIC METABOLIC PNL TOTAL CA: CPT | Performed by: INTERNAL MEDICINE

## 2023-04-19 PROCEDURE — 80061 LIPID PANEL: CPT | Performed by: INTERNAL MEDICINE

## 2023-04-19 PROCEDURE — 85027 COMPLETE CBC AUTOMATED: CPT | Performed by: INTERNAL MEDICINE

## 2023-04-19 PROCEDURE — 99213 OFFICE O/P EST LOW 20 MIN: CPT | Mod: 25 | Performed by: INTERNAL MEDICINE

## 2023-04-19 SDOH — ECONOMIC STABILITY: INCOME INSECURITY: IN THE LAST 12 MONTHS, WAS THERE A TIME WHEN YOU WERE NOT ABLE TO PAY THE MORTGAGE OR RENT ON TIME?: NO

## 2023-04-19 SDOH — ECONOMIC STABILITY: FOOD INSECURITY: WITHIN THE PAST 12 MONTHS, YOU WORRIED THAT YOUR FOOD WOULD RUN OUT BEFORE YOU GOT MONEY TO BUY MORE.: NEVER TRUE

## 2023-04-19 SDOH — ECONOMIC STABILITY: INCOME INSECURITY: HOW HARD IS IT FOR YOU TO PAY FOR THE VERY BASICS LIKE FOOD, HOUSING, MEDICAL CARE, AND HEATING?: NOT HARD AT ALL

## 2023-04-19 SDOH — HEALTH STABILITY: PHYSICAL HEALTH: ON AVERAGE, HOW MANY MINUTES DO YOU ENGAGE IN EXERCISE AT THIS LEVEL?: 150+ MIN

## 2023-04-19 SDOH — HEALTH STABILITY: PHYSICAL HEALTH: ON AVERAGE, HOW MANY DAYS PER WEEK DO YOU ENGAGE IN MODERATE TO STRENUOUS EXERCISE (LIKE A BRISK WALK)?: 7 DAYS

## 2023-04-19 SDOH — ECONOMIC STABILITY: FOOD INSECURITY: WITHIN THE PAST 12 MONTHS, THE FOOD YOU BOUGHT JUST DIDN'T LAST AND YOU DIDN'T HAVE MONEY TO GET MORE.: NEVER TRUE

## 2023-04-19 ASSESSMENT — LIFESTYLE VARIABLES
HOW MANY STANDARD DRINKS CONTAINING ALCOHOL DO YOU HAVE ON A TYPICAL DAY: 1 OR 2
SKIP TO QUESTIONS 9-10: 1
HOW OFTEN DO YOU HAVE SIX OR MORE DRINKS ON ONE OCCASION: NEVER
HOW OFTEN DO YOU HAVE A DRINK CONTAINING ALCOHOL: 2-4 TIMES A MONTH
AUDIT-C TOTAL SCORE: 2

## 2023-04-19 ASSESSMENT — ENCOUNTER SYMPTOMS
NERVOUS/ANXIOUS: 1
ABDOMINAL PAIN: 0
CONSTIPATION: 0
SHORTNESS OF BREATH: 1
CHILLS: 0
DIZZINESS: 1
HEMATURIA: 0
COUGH: 0
HEMATOCHEZIA: 0
FREQUENCY: 1
EYE PAIN: 0
DIARRHEA: 0

## 2023-04-19 ASSESSMENT — SOCIAL DETERMINANTS OF HEALTH (SDOH)
HOW OFTEN DO YOU ATTEND CHURCH OR RELIGIOUS SERVICES?: MORE THAN 4 TIMES PER YEAR
DO YOU BELONG TO ANY CLUBS OR ORGANIZATIONS SUCH AS CHURCH GROUPS UNIONS, FRATERNAL OR ATHLETIC GROUPS, OR SCHOOL GROUPS?: YES
IN A TYPICAL WEEK, HOW MANY TIMES DO YOU TALK ON THE PHONE WITH FAMILY, FRIENDS, OR NEIGHBORS?: MORE THAN THREE TIMES A WEEK
HOW OFTEN DO YOU GET TOGETHER WITH FRIENDS OR RELATIVES?: ONCE A WEEK

## 2023-04-19 ASSESSMENT — ACTIVITIES OF DAILY LIVING (ADL): CURRENT_FUNCTION: NO ASSISTANCE NEEDED

## 2023-04-19 ASSESSMENT — PAIN SCALES - GENERAL: PAINLEVEL: NO PAIN (0)

## 2023-04-19 NOTE — PROGRESS NOTES
"SUBJECTIVE:   Harshil is a 72 year old who presents for Preventive Visit.      4/19/2023     9:12 AM   Additional Questions   Roomed by Maddison Gaston   Accompanied by no one   Patient has been advised of split billing requirements and indicates understanding: Yes  Are you in the first 12 months of your Medicare coverage?  No     Healthy Habits:     In general, how would you rate your overall health?  Excellent    Frequency of exercise:  6-7 days/week    Duration of exercise:  Greater than 60 minutes    Do you usually eat at least 4 servings of fruit and vegetables a day, include whole grains    & fiber and avoid regularly eating high fat or \"junk\" foods?  Yes    Taking medications regularly:  Yes    Medication side effects:  None    Ability to successfully perform activities of daily living:  No assistance needed    Home Safety:  Throw rugs in the hallway and lack of grab bars in the bathroom    Hearing Impairment:  Difficulty following a conversation in a noisy restaurant or crowded room, feel that people are mumbling or not speaking clearly, difficulty following dialogue in the theater, difficult to understand a speaker at a public meeting or [employer] service, need to ask people to speak up or repeat themselves, find that men's voices are easier to understand than woman's and difficulty understanding soft or whispered speech    In the past 6 months, have you been bothered by leaking of urine? Yes    In general, how would you rate your overall mental or emotional health?  Excellent      PHQ-2 Total Score: 0    Additional concerns today:  No    Here for AWV. Overall is feeling well.     Paroxysmal atrial fibrillation. Has been evaluated and ongoing management by cardiology. Has been treated with flecainide with good success.     Intermittent anxiety. Takes propranolol intermittently. This works well when needed.     Have you ever done Advance Care Planning? (For example, a Health Directive, POLST, or a discussion " with a medical provider or your loved ones about your wishes): No, advance care planning information given to patient to review.  Patient declined advance care planning discussion at this time.       Fall risk  Fallen 2 or more times in the past year?: No  Any fall with injury in the past year?: No    Cognitive Screening   1) Repeat 3 items (Leader, Season, Table)    2) Clock draw: NORMAL  3) 3 item recall: Recalls 3 objects  Results: 3 items recalled: COGNITIVE IMPAIRMENT LESS LIKELY    Mini-CogTM Copyright FAUSTINO Read. Licensed by the author for use in [facility]; reprinted with permission (kelton@[facility]). All rights reserved.      Do you have sleep apnea, excessive snoring or daytime drowsiness?: no    Reviewed and updated as needed this visit by clinical staff   Tobacco  Allergies  Meds              Reviewed and updated as needed this visit by Provider                 Social History     Tobacco Use     Smoking status: Never     Smokeless tobacco: Never   Vaping Use     Vaping status: Not on file   Substance Use Topics     Alcohol use: Yes     Alcohol/week: 0.0 standard drinks of alcohol     Comment: 1 drink per week             4/14/2023    12:19 PM   Alcohol Use   Prescreen: >3 drinks/day or >7 drinks/week? No     Do you have a current opioid prescription? No  Do you use any other controlled substances or medications that are not prescribed by a provider? None      Current providers sharing in care for this patient include:   Patient Care Team:  Abram Villanueva MD as PCP - General  Abram Villanueva MD as Assigned PCP  Donovan Thibodeaux DPM as Assigned Musculoskeletal Provider  Dustin Nuñez PA-C as Assigned Sleep Provider    The following health maintenance items are reviewed in Epic and correct as of today:  Health Maintenance   Topic Date Due     ANNUAL REVIEW OF HM ORDERS  Never done     INFLUENZA VACCINE (1) 09/01/2022     MEDICARE ANNUAL WELLNESS VISIT  03/18/2023     FALL RISK  "ASSESSMENT  04/19/2024     LIPID  02/28/2025     COLORECTAL CANCER SCREENING  04/11/2025     ADVANCE CARE PLANNING  03/18/2027     DTAP/TDAP/TD IMMUNIZATION (4 - Td or Tdap) 02/07/2028     HEPATITIS C SCREENING  Completed     PHQ-2 (once per calendar year)  Completed     Pneumococcal Vaccine: 65+ Years  Completed     ZOSTER IMMUNIZATION  Completed     AORTIC ANEURYSM SCREENING (SYSTEM ASSIGNED)  Completed     COVID-19 Vaccine  Completed     IPV IMMUNIZATION  Aged Out     MENINGITIS IMMUNIZATION  Aged Out         Review of Systems   Constitutional: Negative for chills.   HENT: Positive for hearing loss. Negative for congestion and ear pain.    Eyes: Negative for pain.   Respiratory: Positive for shortness of breath. Negative for cough.    Cardiovascular: Negative for chest pain.   Gastrointestinal: Negative for abdominal pain, constipation, diarrhea and hematochezia.   Genitourinary: Positive for frequency, impotence and urgency. Negative for hematuria and penile discharge.   Neurological: Positive for dizziness.   Psychiatric/Behavioral: The patient is nervous/anxious.          OBJECTIVE:   /62   Pulse 59   Temp 97.6  F (36.4  C) (Temporal)   Resp 16   Ht 1.786 m (5' 10.32\")   Wt 83.1 kg (183 lb 3.2 oz)   SpO2 98%   BMI 26.05 kg/m   Estimated body mass index is 26.05 kg/m  as calculated from the following:    Height as of this encounter: 1.786 m (5' 10.32\").    Weight as of this encounter: 83.1 kg (183 lb 3.2 oz).  Physical Exam  GENERAL: healthy, alert and no distress  EYES: PERRL, EOMI  HENT: ear canals and TM's normal  NECK: no adenopathy  RESP: lungs clear to auscultation - no rales, rhonchi or wheezes  CV: regular rate and rhythm, normal S1 S2, no murmur, no peripheral edema and peripheral pulses strong  ABDOMEN: soft, nontender, bowel sounds normal  MS: no gross musculoskeletal defects noted  SKIN: no suspicious lesions or rashes  NEURO: Normal strength and tone  PSYCH: mentation appears normal, " "affect normal/bright         ASSESSMENT / PLAN:       ICD-10-CM    1. Routine general medical examination at a health care facility  Z00.00 Lipid panel reflex to direct LDL Fasting     TSH with free T4 reflex     CBC with platelets     Basic metabolic panel     Lipid panel reflex to direct LDL Fasting     TSH with free T4 reflex     CBC with platelets     Basic metabolic panel      2. Paroxysmal atrial fibrillation (H)  I48.0 TSH with free T4 reflex     CBC with platelets     TSH with free T4 reflex     CBC with platelets      3. Screening for prostate cancer  Z12.5 Prostate Specific Antigen Screen     Prostate Specific Antigen Screen         Overall doing well.  Labwork updated as above.   No medication changes made today.       COUNSELING:  Reviewed preventive health counseling, as reflected in patient instructions      BMI:   Estimated body mass index is 26.05 kg/m  as calculated from the following:    Height as of this encounter: 1.786 m (5' 10.32\").    Weight as of this encounter: 83.1 kg (183 lb 3.2 oz).         He reports that he has never smoked. He has never used smokeless tobacco.      Appropriate preventive services were discussed with this patient, including applicable screening as appropriate for cardiovascular disease, diabetes, osteopenia/osteoporosis, and glaucoma.  As appropriate for age/gender, discussed screening for colorectal cancer, prostate cancer. Checklist reviewing preventive services available has been given to the patient.    Reviewed patients plan of care and provided an AVS. The Basic Care Plan (routine screening as documented in Health Maintenance) for Keith meets the Care Plan requirement. This Care Plan has been established and reviewed with the Patient.      Abram Villanueva MD  [facility]    "

## 2023-04-20 ENCOUNTER — TELEPHONE (OUTPATIENT)
Dept: PEDIATRICS | Facility: CLINIC | Age: 73
End: 2023-04-20
Payer: COMMERCIAL

## 2023-04-20 NOTE — TELEPHONE ENCOUNTER
Reason for Call:  Appointment Request    Patient requesting this type of appt:  fllw up for treatment that the patient and dr discuss on 04/19/2023      Requested provider: Abram Villanueva    Reason patient unable to be scheduled: Not within requested timeframe    When does patient want to be seen/preferred time: 3-7 days    Comments: Patient stated the pcp want to put him on a treatment plan however the patient is set for virtual appt in June which is way to long. He was informed he would have to see the pcp again before pcp would ok the treatment can we get pt in sooner     Could we send this information to you in [facility] or would you prefer to receive a phone call?:   Patient would prefer a phone call   Okay to leave a detailed message?: Yes at Cell number on file:    Telephone Information:   Mobile [phone]       Call taken on 4/20/2023 at 2:54 PM by Namita Martínez

## 2023-04-22 NOTE — TELEPHONE ENCOUNTER
PT returned call for a sooner appt scheduling. I put him onto the Tues appt slot.   Please reach out to the patient if this is not ok. It is a virtual visit.

## 2023-04-25 ENCOUNTER — VIRTUAL VISIT (OUTPATIENT)
Dept: PEDIATRICS | Facility: CLINIC | Age: 73
End: 2023-04-25
Payer: COMMERCIAL

## 2023-04-25 DIAGNOSIS — E78.5 HYPERLIPIDEMIA LDL GOAL <130: Primary | ICD-10-CM

## 2023-04-25 PROCEDURE — 99213 OFFICE O/P EST LOW 20 MIN: CPT | Mod: VID | Performed by: INTERNAL MEDICINE

## 2023-04-25 RX ORDER — ROSUVASTATIN CALCIUM 5 MG/1
5 TABLET, COATED ORAL DAILY
Qty: 90 TABLET | Refills: 3 | Status: SHIPPED | OUTPATIENT
Start: 2023-04-25 | End: 2024-01-10

## 2023-04-25 NOTE — PROGRESS NOTES
Harshil is a 72 year old who is being evaluated via a billable video visit.            Assessment & Plan       ICD-10-CM    1. Hyperlipidemia LDL goal <130  E78.5 rosuvastatin (CRESTOR) 5 MG tablet     Lipid panel reflex to direct LDL Fasting     ALT        Start rosuvastatin. Due to historical side effects with atorvastatin, begin low dose 5 mg daily.  Call if significant side effects develop.  If tolerates, recheck fasting labwork in 2-3 months.    Abram Villanueva MD  [facility]    Subjective   Harshil is a 72 year old, presenting for the following health issues:  No chief complaint on file.        4/19/2023     9:12 AM   Additional Questions   Roomed by Maddison Gaston   Accompanied by no one     History of Present Illness       Hyperlipidemia:  He presents for follow up of hyperlipidemia.  He is not taking medication to lower cholesterol. He is not having myalgia or other side effects to statin medications.    He eats 4 or more servings of fruits and vegetables daily.He consumes 1 sweetened beverage(s) daily.He exercises with enough effort to increase his heart rate 60 or more minutes per day.  He exercises with enough effort to increase his heart rate 7 days per week.   He is taking medications regularly.     Persistently elevated LDL level over the past few years.  Lab Results   Component Value Date     04/19/2023     02/28/2020     02/12/2019     He had been treated with atorvastatin in the past. This caused myalgias. Was able to stop and manage with lifestyle.   Reviewed cardiac risk.   The 10-year ASCVD risk score (Jean Paul ACEVEDO, et al., 2019) is: 18.9%    Values used to calculate the score:      Age: 72 years      Sex: Male      Is Non- : No      Diabetic: No      Tobacco smoker: No      Systolic Blood Pressure: 118 mmHg      Is BP treated: No      HDL Cholesterol: 62 mg/dL      Total Cholesterol: 232 mg/dL    10 year risk is at a level which warrants  intervention.  We discussed options.           Objective           Vitals:  No vitals were obtained today due to virtual visit.    Physical Exam   GEN: No distress          Video-Visit Details    Type of service:  Video Visit   Video Start Time: 11:26 AM  Video End Time:11:30 AM    Originating Location (pt. Location): Home  Distant Location (provider location):  On-site  Platform used for Video Visit: Well

## 2023-06-02 RX ORDER — CLONAZEPAM 0.5 MG/1
0.5 TABLET ORAL
Qty: 30 TABLET | Refills: 1 | Status: SHIPPED | OUTPATIENT
Start: 2023-06-02 | End: 2023-07-31

## 2023-06-02 NOTE — TELEPHONE ENCOUNTER
Pending Prescriptions:                       Disp   Refills    clonazePAM (KLONOPIN) 0.5 MG tablet       30 tab*1            Sig: Take 1 tablet (0.5 mg) by mouth nightly as needed           (REM behavior disorder)      Patient is requesting a 90 day supply. He states the medication is working well.    Ekaterina FRENCH CMA

## 2023-06-05 DIAGNOSIS — N52.9 ERECTILE DYSFUNCTION, UNSPECIFIED ERECTILE DYSFUNCTION TYPE: ICD-10-CM

## 2023-06-07 RX ORDER — SILDENAFIL 100 MG/1
TABLET, FILM COATED ORAL
Qty: 6 TABLET | Refills: 5 | Status: SHIPPED | OUTPATIENT
Start: 2023-06-07 | End: 2023-11-02

## 2023-06-07 NOTE — TELEPHONE ENCOUNTER
Prescription approved per [facility] Refill Protocol.  Elza Rosales RN, BSN  [facility]

## 2023-06-16 ENCOUNTER — LAB (OUTPATIENT)
Dept: LAB | Facility: CLINIC | Age: 73
End: 2023-06-16
Payer: COMMERCIAL

## 2023-06-16 DIAGNOSIS — E78.5 HYPERLIPIDEMIA LDL GOAL <130: ICD-10-CM

## 2023-06-16 LAB
ALT SERPL W P-5'-P-CCNC: 17 U/L (ref 0–70)
CHOLEST SERPL-MCNC: 154 MG/DL
HDLC SERPL-MCNC: 61 MG/DL
LDLC SERPL CALC-MCNC: 82 MG/DL
NONHDLC SERPL-MCNC: 93 MG/DL
TRIGL SERPL-MCNC: 54 MG/DL

## 2023-06-16 PROCEDURE — 84460 ALANINE AMINO (ALT) (SGPT): CPT

## 2023-06-16 PROCEDURE — 80061 LIPID PANEL: CPT

## 2023-06-16 PROCEDURE — 36415 COLL VENOUS BLD VENIPUNCTURE: CPT

## 2023-07-31 RX ORDER — CLONAZEPAM 0.5 MG/1
0.5 TABLET ORAL
Qty: 30 TABLET | Refills: 1 | Status: SHIPPED | OUTPATIENT
Start: 2023-07-31 | End: 2023-09-15

## 2023-07-31 NOTE — TELEPHONE ENCOUNTER
Pending Prescriptions:                       Disp   Refills    clonazePAM (KLONOPIN) 0.5 MG tablet       30 tab*1            Sig: Take 1 tablet (0.5 mg) by mouth nightly as needed           (REM behavior disorder)

## 2023-08-07 ENCOUNTER — TELEPHONE (OUTPATIENT)
Dept: CARDIOLOGY | Facility: CLINIC | Age: 73
End: 2023-08-07
Payer: COMMERCIAL

## 2023-08-07 DIAGNOSIS — I48.0 PAROXYSMAL ATRIAL FIBRILLATION (H): Primary | ICD-10-CM

## 2023-08-07 NOTE — TELEPHONE ENCOUNTER
Patient called reports he has been in AF since last Tuesday.  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  He reports he normally coverts out of it in a day or so and has not.  Took as needed propranolol for two days and stopped it as it did nothing.  HR ranging from .  Does have a fit bit however does not know how to send over tracing.   Recommended trying again his as needed propranolol.  Will schedule patient for EKG tomorrow in [place]. On scheduled bhakti and eliquis at this time.  Tolerating symptoms.    Has only seen Dr Dubon who is now retired.  Will have Dr Glass review ECG tomorrow.  GENEVIEVE Ortega

## 2023-08-08 DIAGNOSIS — I48.0 PAROXYSMAL ATRIAL FIBRILLATION (H): ICD-10-CM

## 2023-08-08 PROCEDURE — 93000 ELECTROCARDIOGRAM COMPLETE: CPT | Performed by: INTERNAL MEDICINE

## 2023-08-08 NOTE — TELEPHONE ENCOUNTER
He is in atrial flutter, probably typical.  Controlled ventricular rate.  Hold flecainide for 48 hours.  Then, restart it.  If he does not convert spontaneously, please see BHAKTI in follow-up.  DI

## 2023-08-08 NOTE — TELEPHONE ENCOUNTER
Spoke to patient to discuss recommendation per Dr Glass:  He is in atrial flutter, probably typical.  Controlled ventricular rate.  Hold flecainide for 48 hours.  Then, restart it.  If he does not convert spontaneously, please see BHAKTI in follow-up.  DI  Patient instructed to resume flecainide Thursday evening and call on Friday with update.  Patient agreed with plan and provided verbal understanding.  GENEVIEVE Ortega

## 2023-08-11 NOTE — TELEPHONE ENCOUNTER
Patient called in follow up.  He stopped his flecainide after his am dose on 8/8.  He reports he self converted back to NSR Wednesday afternoon (8/9).  Currently HR in mid 60's.  Has annual follow up with BHAKTI Govea on 10/20.  Patient inquired if he should go back out of rhythm is he to do the same regimen of holding flecainide for 48 hours.  Informed patient that I would need to review with Dr Glass for recommendations.  GENEVIEVE Ortega

## 2023-08-12 NOTE — TELEPHONE ENCOUNTER
"The approach of \"holding flecainide\" is reasonable only if he is in aflutter.  But it is difficult for him to know when he is in aflutter (vs AF).    So, no, he should not routinely hold flecainide when out of rhythm.  DI  "

## 2023-08-14 NOTE — TELEPHONE ENCOUNTER
"Reviewed with patient Dr Glass's recommendations:  The approach of \"holding flecainide\" is reasonable only if he is in aflutter.  But it is difficult for him to know when he is in aflutter (vs AF).    So, no, he should not routinely hold flecainide when out of rhythm.  Patient provided verbal understanding regarding above.  He is thinking of purchasing a [employer] [place] so he can capture the tracings and send through my chart.  He will contact us if and when he gets this so he can test it out.  GENEVIEVE Ortega  "

## 2023-09-05 ENCOUNTER — TELEPHONE (OUTPATIENT)
Dept: PEDIATRICS | Facility: CLINIC | Age: 73
End: 2023-09-05
Payer: COMMERCIAL

## 2023-09-05 NOTE — TELEPHONE ENCOUNTER
Pt calls, informs he has been using generic sildenafil 100mg for erectile dysfunction for the past few years. Pt informs medication stopped being effective a couple months ago.    Scheduled virtual visit with Dr. Villanueva for 10/31/23 to discuss alternate erectile dysfunction medication options    Madyson STEELE RN

## 2023-09-12 ENCOUNTER — MYC MEDICAL ADVICE (OUTPATIENT)
Dept: PEDIATRICS | Facility: CLINIC | Age: 73
End: 2023-09-12
Payer: COMMERCIAL

## 2023-09-12 ENCOUNTER — NURSE TRIAGE (OUTPATIENT)
Dept: PEDIATRICS | Facility: CLINIC | Age: 73
End: 2023-09-12
Payer: COMMERCIAL

## 2023-09-12 NOTE — TELEPHONE ENCOUNTER
Called patient to discuss symptoms. Please see nurse triage telephone encounter.    Iain Petersen RN on 9/12/2023 at 4:48 PM

## 2023-09-12 NOTE — TELEPHONE ENCOUNTER
Nurse Triage SBAR    Is this a 2nd Level Triage? NO    Situation: Called patient to inquire about symptoms reported via mychart.     Background: Pt reports he has had a sore throat and cough ongoing for 8 days. Pt states that he took a covid test on the 2nd day of having a sore throat, and it was negative. Pt reports using cough drops, Tylenol and Advil. Pt denies any recent exposure to strep.    Assessment: Pt endorses infrequent non-productive cough, mild nausea, sore throat, deepened voice and intermittent chest tightness (see SBAR below). Pt denies vomiting, nasal discharge, fever, redness in the throat, white spots in the throat, eye irritation, rash, headache, SOB, difficulty breathing, chest pain and pain while swallowing.    Protocol Recommended Disposition:   See in Office Today    Recommendation: Scheduled patient to be seen per request according to his schedule.    Reason for Disposition   Patient wants to be seen    Additional Information   Negative: SEVERE difficulty breathing (e.g., struggling for each breath, speaks in single words)   Negative: Sounds like a life-threatening emergency to the triager   Negative: Drooling or spitting out saliva (because can't swallow)   Negative: Unable to open mouth completely   Negative: Drinking very little and has signs of dehydration (e.g., no urine > 12 hours, very dry mouth, very lightheaded)   Negative: Patient sounds very sick or weak to the triager   Negative: Difficulty breathing (per caller) but not severe   Negative: Fever > 103 F (39.4 C)   Negative: Refuses to drink anything for > 12 hours   Negative: SEVERE difficulty breathing (e.g., struggling for each breath, speaks in single words)   Negative: Difficult to awaken or acting confused (e.g., disoriented, slurred speech)   Negative: Shock suspected (e.g., cold/pale/clammy skin, too weak to stand, low BP, rapid pulse)   Negative: Passed out (i.e., lost consciousness, collapsed and was not  "responding)    Protocols used: Sore Throat-A-OH, Chest Pain-A-OH    S-(situation): While discussing pt's sore throat symptoms, he mentioned intermittent \"chest tightness\".    B-(background): Pt reports that for the past few days, his \"upper chest\" will feel \"a bit tight\". He states this can last up to a few hours at a time. Pt denies feeling any chest tightness today. Pt reports a history of a-fib, states he has not had an episode in months. Pt denies a history of lung disease.    A-(assessment): Pt reports \"intermittent upper chest tightness\". Pt states this tightness does not worsen with exertion. Pt denies having any chest pain, rating it a 0-1/10. Pt states this feeling does not radiate.    R-(recommendations): Informed patient that protocol recommends calling 911 EMS when chest pain is reported lasting longer than 5 minutes. Explained to patient that is the recommendation for chest pain/tightness as it may indicate a myocardial infarction. Pt verbalized understanding. Pt reports \"this is just chest wall related\", and declines seeking medical attention in relation to his chest tightness. Pt reports he has had this before, and \"it was nothing\". Pt denies difficulty breathing or interference with his daily activities. Scheduled patient per request for previously discussed sore throat after acknowledging the risks of untreated chest pain/tightness.    Iain Petersen RN on 9/12/2023 at 4:41 PM      "

## 2023-09-15 ENCOUNTER — OFFICE VISIT (OUTPATIENT)
Dept: PEDIATRICS | Facility: CLINIC | Age: 73
End: 2023-09-15
Payer: COMMERCIAL

## 2023-09-15 VITALS
WEIGHT: 185.9 LBS | SYSTOLIC BLOOD PRESSURE: 125 MMHG | HEART RATE: 70 BPM | TEMPERATURE: 97.3 F | RESPIRATION RATE: 20 BRPM | DIASTOLIC BLOOD PRESSURE: 69 MMHG | OXYGEN SATURATION: 97 % | BODY MASS INDEX: 26.44 KG/M2

## 2023-09-15 DIAGNOSIS — J02.9 ACUTE PHARYNGITIS, UNSPECIFIED ETIOLOGY: Primary | ICD-10-CM

## 2023-09-15 LAB
DEPRECATED S PYO AG THROAT QL EIA: NEGATIVE
GROUP A STREP BY PCR: NOT DETECTED

## 2023-09-15 PROCEDURE — 87651 STREP A DNA AMP PROBE: CPT | Performed by: PHYSICIAN ASSISTANT

## 2023-09-15 PROCEDURE — 99213 OFFICE O/P EST LOW 20 MIN: CPT | Performed by: PHYSICIAN ASSISTANT

## 2023-09-15 ASSESSMENT — PAIN SCALES - GENERAL: PAINLEVEL: NO PAIN (0)

## 2023-09-15 NOTE — PROGRESS NOTES
Assessment & Plan     Acute pharyngitis, unspecified etiology  TC pending. Continue with symptomatic treatment.   - Streptococcus A Rapid Screen w/Reflex to PCR - Clinic Collect  - Group A Streptococcus PCR Throat Swab    SHONNA Cabrera [facility] Chua is a 73 year old, presenting for the following health issues:  Pharyngitis        9/15/2023     9:00 AM   Additional Questions   Roomed by [facility]   Accompanied by self         9/15/2023     9:00 AM   Patient Reported Additional Medications   Patient reports taking the following new medications none       HPI     Acute Illness  Acute illness concerns: sore throat   Onset/Duration: about 11 days ago   Symptoms:  Fever: No  Chills/Sweats: No  Headache (location?): YES  Sinus Pressure: No  Conjunctivitis:  No  Ear Pain: no  Rhinorrhea: No  Congestion: No  Sore Throat: YES  Cough: YES-non-productive  Wheeze: No  Decreased Appetite: No  Nausea: No  Vomiting: No  Diarrhea: No  Dysuria/Freq.: No  Dysuria or Hematuria: No  Fatigue/Achiness: YES-fatigue   Sick/Strep Exposure: No  Therapies tried and outcome: cough drops, tylenol and advil.     Tested for COVID on day 2 of symptoms and was negative.       Review of Systems   Constitutional, HEENT, cardiovascular, pulmonary, gi and gu systems are negative, except as otherwise noted.      Objective    /69 (BP Location: Right arm, Patient Position: Sitting, Cuff Size: Adult Regular)   Pulse 70   Temp 97.3  F (36.3  C) (Tympanic)   Resp 20   Wt 84.3 kg (185 lb 14.4 oz)   SpO2 97%   BMI 26.44 kg/m    Body mass index is 26.44 kg/m .  Physical Exam   GENERAL: alert and no distress  EYES: Eyes grossly normal to inspection, PERRL and conjunctivae and sclerae normal  HENT: ear canals and TM's normal, nose and mouth--PND present  NECK: no adenopathy  RESP: lungs clear to auscultation - no rales, rhonchi or wheezes  CV: regular rate and rhythm, normal S1 S2, no S3 or  S4    Results for orders placed or performed in visit on 09/15/23   Streptococcus A Rapid Screen w/Reflex to PCR - Clinic Collect     Status: Normal    Specimen: Throat; Swab   Result Value Ref Range    Group A Strep antigen Negative Negative

## 2023-09-15 NOTE — TELEPHONE ENCOUNTER
Note from pharmacy on fax states pt is going out of town 9/19 for 2 weeks. Need to add ok to fill early on Rx when sent as patient will be a few days short.     clonazepam      Last Written Prescription Date:  7/31/23  Last Fill Quantity: 30,   # refills: 1  Last Office Visit: 1/4/23  Future Office visit:    Next 5 appointments (look out 90 days)      Sep 15, 2023  9:00 AM  (Arrive by 8:40 AM)  Provider Visit with Alexis Ramirez PA-C  [facility] ([facility] ) [address] [place] [address]  [address]  [facility] [id number]  [phone]     Oct 31, 2023  8:30 AM  (Arrive by 8:10 AM)  Provider Visit with Abram Villanueva MD  [facility] ([facility] ) [address] [place] [address]  [address]  [facility] [id number]  [phone]             Routing refill request to provider for review/approval because:  Drug not on the [facility], [facility] or [facility] refill protocol or controlled substance

## 2023-09-19 RX ORDER — CLONAZEPAM 0.5 MG/1
0.5 TABLET ORAL
Qty: 30 TABLET | Refills: 1 | Status: SHIPPED | OUTPATIENT
Start: 2023-09-19 | End: 2023-11-07

## 2023-09-26 DIAGNOSIS — I48.0 PAROXYSMAL ATRIAL FIBRILLATION (H): ICD-10-CM

## 2023-09-26 RX ORDER — FLECAINIDE ACETATE 150 MG/1
150 TABLET ORAL 2 TIMES DAILY
Qty: 180 TABLET | Refills: 0 | Status: SHIPPED | OUTPATIENT
Start: 2023-09-26 | End: 2023-10-20

## 2023-10-06 ENCOUNTER — OFFICE VISIT (OUTPATIENT)
Dept: URGENT CARE | Facility: URGENT CARE | Age: 73
End: 2023-10-06
Payer: COMMERCIAL

## 2023-10-06 ENCOUNTER — E-VISIT (OUTPATIENT)
Dept: PEDIATRICS | Facility: CLINIC | Age: 73
End: 2023-10-06
Payer: COMMERCIAL

## 2023-10-06 ENCOUNTER — ANCILLARY PROCEDURE (OUTPATIENT)
Dept: GENERAL RADIOLOGY | Facility: CLINIC | Age: 73
End: 2023-10-06
Attending: PHYSICIAN ASSISTANT
Payer: COMMERCIAL

## 2023-10-06 VITALS
HEART RATE: 78 BPM | DIASTOLIC BLOOD PRESSURE: 80 MMHG | OXYGEN SATURATION: 99 % | SYSTOLIC BLOOD PRESSURE: 158 MMHG | RESPIRATION RATE: 20 BRPM | TEMPERATURE: 98 F

## 2023-10-06 DIAGNOSIS — R05.2 SUBACUTE COUGH: ICD-10-CM

## 2023-10-06 DIAGNOSIS — R05.2 SUBACUTE COUGH: Primary | ICD-10-CM

## 2023-10-06 DIAGNOSIS — R05.9 COUGH, UNSPECIFIED TYPE: Primary | ICD-10-CM

## 2023-10-06 PROCEDURE — 99207 PR NON-BILLABLE SERV PER CHARTING: CPT | Performed by: PHYSICIAN ASSISTANT

## 2023-10-06 PROCEDURE — 99214 OFFICE O/P EST MOD 30 MIN: CPT | Performed by: PHYSICIAN ASSISTANT

## 2023-10-06 PROCEDURE — 71046 X-RAY EXAM CHEST 2 VIEWS: CPT | Mod: TC | Performed by: RADIOLOGY

## 2023-10-06 RX ORDER — KETOCONAZOLE 20 MG/ML
SHAMPOO TOPICAL 2 TIMES DAILY
COMMUNITY
Start: 2023-08-10

## 2023-10-06 RX ORDER — BENZONATATE 200 MG/1
200 CAPSULE ORAL 3 TIMES DAILY PRN
Qty: 20 CAPSULE | Refills: 0 | Status: SHIPPED | OUTPATIENT
Start: 2023-10-06 | End: 2023-11-07

## 2023-10-06 RX ORDER — CLOBETASOL PROPIONATE 0.5 MG/ML
SOLUTION TOPICAL
COMMUNITY
Start: 2023-08-10

## 2023-10-06 NOTE — PROGRESS NOTES
Assessment & Plan     1. Subacute cough  For the past 6 weeks he has had a nonproductive cough.  No other cold symptoms.  On examination, he does have some postnasal drainage in the posterior pharynx.  Lungs are clear to auscultation bilaterally.  Rest of exam is normal.  Chest x-ray is negative for abnormality per my read.  No leg pain or swelling.  He has not had chest pain or shortness of breath.  No hemoptysis.  Low suspicion for MI/ACS, pneumonia, pulmonary embolism etc.  I will have him start using Flonase and Tessalon for symptomatic care.  If cough not improved in the next 1 to 2 weeks, advise follow-up with PCP for recheck.  - XR Chest 2 Views; Future  - benzonatate (TESSALON) 200 MG capsule; Take 1 capsule (200 mg) by mouth 3 times daily as needed for cough  Dispense: 20 capsule; Refill: 0        Return in about 1 week (around 10/13/2023), or if symptoms worsen or fail to improve.    Diagnosis and treatment plan was reviewed with patient and/or family.   We went over any labs or imaging. Discussed worsening symptoms or little to no relief despite treatment plan to follow-up with PCP or return to clinic.  Patient verbalizes understanding. All questions were addressed and answered.     Brisa Francois PA-C  [facility] [place]    CHIEF COMPLAINT:   Chief Complaint   Patient presents with    Cough     Cough X6wks      Subjective     Harshil is a 73 year old male who presents to clinic today for evaluation of cough. Cough started 6 weeks ago.   Starts with a tickle and then he cough. Coughs is non productive, every so often he will have phlem.   Symptoms started off with only a cough, no runny nose or congestion.   No leg pain or swelling. No chest pain or shortness of breath.   No fever, chills, night sweats or hemoptysis.     He has not taken any medication for symptoms.        Past Medical History:   Diagnosis Date    Aortic regurgitation     moderate    New onset atrial fibrillation (H)      Varicose veins of lower extremities with inflammation      Past Surgical History:   Procedure Laterality Date    APPENDECTOMY  Age 20    DAVINCI HERNIORRHAPHY INGUINAL Left 8/18/2020    Procedure: ROBOTIC ASSISTED LEFT INGUINAL HERNIA REPAIR WITH MESH;  Surgeon: Keshav Mehta MD;  Location: [facility] OR    LIGATE VEIN      Varicose veins, several times    TONSILLECTOMY & ADENOIDECTOMY  Approx age 10    [facility] LAP,HERNIA REPAIR PROC,UNLIST  2002    Z NONSPECIFIC PROCEDURE  2001    vein stripping     Social History     Tobacco Use    Smoking status: Never    Smokeless tobacco: Never   Substance Use Topics    Alcohol use: Yes     Alcohol/week: 0.0 standard drinks of alcohol     Comment: 1 drink per week     Current Outpatient Medications   Medication    apixaban ANTICOAGULANT (ELIQUIS ANTICOAGULANT) 5 MG tablet    benzonatate (TESSALON) 200 MG capsule    calcium carbonate (TUMS) 500 MG chewable tablet    Calcium-Magnesium-Zinc 333-133-5 MG TABS per tablet    Cholecalciferol (VITAMIN D) 1000 UNIT capsule    clobetasol (TEMOVATE) 0.05 % external solution    clonazePAM (KLONOPIN) 0.5 MG tablet    FISH OIL 1200 MG OR CAPS    flecainide (TAMBOCOR) 150 MG tablet    GLUCOSAMINE 500 MG OR CAPS    ketoconazole (NIZORAL) 2 % external shampoo    metroNIDAZOLE (METROCREAM) 0.75 % external cream    propranolol (INDERAL) 10 MG tablet    rosuvastatin (CRESTOR) 5 MG tablet    sildenafil (VIAGRA) 100 MG tablet    hydrOXYzine (ATARAX) 25 MG tablet     No current facility-administered medications for this visit.     Allergies   Allergen Reactions    No Known Drug Allergy        10 point ROS of systems were all negative except for pertinent positives noted in my HPI.      Exam:   BP (!) 158/80   Pulse 78   Temp 98  F (36.7  C) (Tympanic)   Resp 20   SpO2 99%   Constitutional: healthy, alert and no distress  Head: Normocephalic, atraumatic.  Eyes: conjunctiva clear, no drainage  ENT: TMs clear and shiny car, nasal mucosa pink and moist,  throat without tonsillar hypertrophy or erythema. Post nasal drainage noted.   Neck: neck is supple, no cervical lymphadenopathy or nuchal rigidity  Cardiovascular: RRR  Respiratory: CTA bilaterally, no rhonchi or rales  Extremities: Neg homans sign. No edema noted.   Skin: no rashes  Neurologic: Speech clear, gait normal. Moves all extremities.    CXR -- No acute abnormality per my read.

## 2023-10-06 NOTE — TELEPHONE ENCOUNTER
Call placed to pt with message from provider  Pt verbalized understanding and agrees to the plan    Thank you  Heather Fisher RN on 10/6/2023 at 12:55 PM

## 2023-10-06 NOTE — PATIENT INSTRUCTIONS
Your chest x-ray looks okay, the radiology back with a different report I will call you.  Start using Flonase, can help with postnasal drainage, which may be contributing to your cough.  Try Tessalon for cough suppression.  I am unsure the cause of your cough, but it may be a postviral cough.    If symptoms do not improve in the next 1 to 2 weeks, please contact PCP recheck.    If you have fever, chills, chest pain, shortness of breath, leg pain or swelling, coughing up blood etc., please follow-up right away.

## 2023-10-06 NOTE — TELEPHONE ENCOUNTER
Provider E-Visit time total (minutes): 3 minutes.     Dr. Villanueva is out of the office and I am reviewing his in-basket and helping while he is away.  I read over this E visit and unfortunately this is not appropriate for e visit.  We would need to listen to his chest and possibly get a chest XR.  If there are no appointments for today, he can be seen in the .  Thank you.     Tatianna Lau PA-C

## 2023-10-16 ENCOUNTER — HOSPITAL ENCOUNTER (OUTPATIENT)
Dept: CARDIOLOGY | Facility: CLINIC | Age: 73
Discharge: HOME | End: 2023-10-16
Attending: INTERNAL MEDICINE | Admitting: INTERNAL MEDICINE
Payer: COMMERCIAL

## 2023-10-16 DIAGNOSIS — I35.1 AORTIC VALVE INSUFFICIENCY, ETIOLOGY OF CARDIAC VALVE DISEASE UNSPECIFIED: ICD-10-CM

## 2023-10-16 LAB — LVEF ECHO: NORMAL

## 2023-10-16 PROCEDURE — 93306 TTE W/DOPPLER COMPLETE: CPT | Mod: 26 | Performed by: INTERNAL MEDICINE

## 2023-10-16 PROCEDURE — 93306 TTE W/DOPPLER COMPLETE: CPT

## 2023-10-20 ENCOUNTER — OFFICE VISIT (OUTPATIENT)
Dept: CARDIOLOGY | Facility: CLINIC | Age: 73
End: 2023-10-20
Payer: COMMERCIAL

## 2023-10-20 VITALS
OXYGEN SATURATION: 97 % | SYSTOLIC BLOOD PRESSURE: 129 MMHG | BODY MASS INDEX: 26.18 KG/M2 | WEIGHT: 187 LBS | HEART RATE: 63 BPM | HEIGHT: 71 IN | DIASTOLIC BLOOD PRESSURE: 75 MMHG

## 2023-10-20 DIAGNOSIS — I35.1 AORTIC VALVE INSUFFICIENCY, ETIOLOGY OF CARDIAC VALVE DISEASE UNSPECIFIED: ICD-10-CM

## 2023-10-20 DIAGNOSIS — I48.0 PAROXYSMAL ATRIAL FIBRILLATION (H): ICD-10-CM

## 2023-10-20 PROCEDURE — 99215 OFFICE O/P EST HI 40 MIN: CPT | Performed by: PHYSICIAN ASSISTANT

## 2023-10-20 PROCEDURE — 93000 ELECTROCARDIOGRAM COMPLETE: CPT | Performed by: PHYSICIAN ASSISTANT

## 2023-10-20 RX ORDER — FLECAINIDE ACETATE 150 MG/1
150 TABLET ORAL 2 TIMES DAILY
Qty: 180 TABLET | Refills: 3 | Status: SHIPPED | OUTPATIENT
Start: 2023-10-20 | End: 2023-11-20

## 2023-10-20 RX ORDER — METOPROLOL SUCCINATE 25 MG/1
12.5 TABLET, EXTENDED RELEASE ORAL 2 TIMES DAILY
Qty: 90 TABLET | Refills: 3 | Status: SHIPPED | OUTPATIENT
Start: 2023-10-20 | End: 2023-10-25

## 2023-10-20 NOTE — PROGRESS NOTES
Electrophysiology Clinic Progress Note    Keith Kevin MRN# [id number]   YOB: 1950 Age: 73 year old     Primary cardiology team: Was Dr. Dubon         Assessment and Plan     In summary, Keith Kevin presents today for a routine annual follow up visit regarding persistent AF, on flecainide with an acceptable QRS, appropriately anticoagulated. He does have fairly frequent breakthrough arrhythmias, with documented atrial flutter in August, always rate controlled per his Fitbit monitor. I have advised him to stop his as needed propranolol, and start Toprol XL 12.5 mg BID to help avoid flutter recurrence.  I have also asked him to start monitoring more closely with a Kardia[employer] monitor.  If this helps to quiet things down, we can consider giving him an extra 150 mg of flecainide here and there for rare breakthrough episodes to convert him.  If he continues to have frequent episodes, then we may want to change him to Tikosyn.  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  I did also discuss ablation with the patient, he is hesitant.    I will have my nurses follow-up with him in 2 weeks to see how the current regimen is going.  If he continues to have frequent episodes of AF, Dr. Gutiérrez recommends changing him to Tikosyn, which would require inpatient loading (QTc 427 msec, normal renal fn).    It was a pleasure meeting Harshil today!    SHONNA Newman [facility]         History of Presenting Illness     Keith Kevin is a pleasant 73 year old patient with a pertinent history of persistent AF on flecainide, anticoagulated with Eliquis.    Today, I am meeting Harshil along with his wife, they are both very pleasant. He's been having break-through AF/AFL intermittently can occur several times within a week, every other week, or every couple months. His longest recent episode lasted 8 days, that was in August. Since then his longest episode has  "been 2 days.  They always last greater than 24 hours in duration.  He does sometimes take a dose of his as needed propranolol which helps to limit the duration of his episodes.  Almost always comes on at night. Avid exerciser along with his wife. Feels a \"catch\" in his throat and some fatigue during episodes.  Sometimes triggered by stressful situations, but many times no clear trigger.  He drinks minimal alcohol and caffeine, hydrating appropriately.  Patient denies chest pain, shortness of breath, orthopnea, edema, near syncope or syncope.     EKG today shows Sinus rhythm at 64 bpm with first-degree AV block ( ms), and QRS of 106 ms, QTc 427 msec.  Recent TTE showed EF 60-65%, mild-mod AI, mild-mod CA.   Labs from April show a normal BMP and CBC.         Review of Systems     12-pt ROS is negative except for as noted in the HPI.          Physical Exam     Vitals: /75   Pulse 63   Ht 1.803 m (5' 11\")   Wt 84.8 kg (187 lb)   SpO2 97%   BMI 26.08 kg/m    Wt Readings from Last 10 Encounters:   10/20/23 84.8 kg (187 lb)   09/15/23 84.3 kg (185 lb 14.4 oz)   04/19/23 83.1 kg (183 lb 3.2 oz)   10/18/22 84.1 kg (185 lb 8 oz)   08/18/22 82.5 kg (181 lb 12.8 oz)   03/18/22 81.6 kg (180 lb)   08/03/21 78.9 kg (174 lb)   07/08/21 78.2 kg (172 lb 4.8 oz)   08/18/20 79.3 kg (174 lb 12.8 oz)   08/12/20 80.3 kg (177 lb)       Constitutional:  Patient is pleasant, alert, cooperative, and in NAD.  HEENT:  NCAT. PERRLA. EOM's intact.   Neck:  CVP appears normal. No carotid bruits.   Pulmonary: Normal respiratory effort. CTAB.   Cardiac: RRR, normal S1/S2, no S3/S4, no murmur or rub.   Abdomen:  Non-tender abdomen, no hepatosplenomegaly appreciated.   Vascular: Pulses in the upper and lower extremities are 2+ and equal bilaterally.  Extremities: No edema, erythema, cyanosis or tenderness appreciated.  Skin:  No rashes or lesions appreciated.   Neurological:  No gross motor or sensory deficits.   Psych: Appropriate " "affect.          Data   Labs reviewed:  Recent Labs   Lab Test 06/16/23  0917 04/19/23  1002 07/08/21  1402 02/28/20  1010   LDL 82 157*  --  152*   HDL 61 62  --  62   NHDL 93 170*  --  167*   CHOL 154 232*  --  229*   TRIG 54 67  --  74   TSH  --  1.45 1.04  --        Lab Results   Component Value Date    WBC 4.5 04/19/2023    WBC 7.1 07/08/2021    RBC 4.75 04/19/2023    RBC 4.80 07/08/2021    HGB 15.5 04/19/2023    HGB 15.7 07/08/2021    HCT 45.6 04/19/2023    HCT 46.0 07/08/2021    MCV 96 04/19/2023    MCV 96 07/08/2021    MCH 32.6 04/19/2023    MCH 32.7 07/08/2021    MCHC 34.0 04/19/2023    MCHC 34.1 07/08/2021    RDW 12.7 04/19/2023    RDW 12.9 07/08/2021     04/19/2023     07/08/2021       Lab Results   Component Value Date     04/19/2023     07/08/2021    POTASSIUM 4.6 04/19/2023    POTASSIUM 4.0 07/08/2021    CHLORIDE 105 04/19/2023    CHLORIDE 108 07/08/2021    CO2 23 04/19/2023    CO2 22 07/08/2021    ANIONGAP 12 04/19/2023    ANIONGAP 8 07/08/2021    GLC 93 04/19/2023     (H) 07/08/2021    BUN 20.5 04/19/2023    BUN 21 07/08/2021    CR 1.01 04/19/2023    CR 0.97 07/08/2021    GFRESTIMATED 79 04/19/2023    GFRESTIMATED 78 07/08/2021    GFRESTBLACK >90 07/08/2021    STEPHEN 9.8 04/19/2023    STEPHEN 9.1 07/08/2021      Lab Results   Component Value Date    AST 32 02/08/2010    ALT 17 06/16/2023    ALT 18 02/08/2010       No results found for: \"A1C\"    No results found for: \"INR\"         Problem List     Patient Active Problem List   Diagnosis    Hyperlipidemia LDL goal <130    External hemorrhoids    Varicose veins of both lower extremities    Acute reaction to stress    Left inguinal hernia    Paroxysmal atrial fibrillation (H)            Medications     Current Outpatient Medications   Medication Sig Dispense Refill    apixaban ANTICOAGULANT (ELIQUIS ANTICOAGULANT) 5 MG tablet Take 1 tablet (5 mg) by mouth 2 times daily 180 tablet 3    benzonatate (TESSALON) 200 MG capsule Take 1 " capsule (200 mg) by mouth 3 times daily as needed for cough 20 capsule 0    calcium carbonate (TUMS) 500 MG chewable tablet Take 1 tablet by mouth daily. 100 tablet 3    Calcium-Magnesium-Zinc 333-133-5 MG TABS per tablet Take by mouth daily       Cholecalciferol (VITAMIN D) 1000 UNIT capsule Take 1 capsule by mouth daily. 100 capsule 12    clobetasol (TEMOVATE) 0.05 % external solution APPLY TO AFFECTED AREAS OF THE SCALP TWICE DAILY FOR 2 WEEKS, TWICE WEEKLY THEREAFTER      clonazePAM (KLONOPIN) 0.5 MG tablet Take 1 tablet (0.5 mg) by mouth nightly as needed (REM behavior disorder) 30 tablet 1    FISH OIL 1200 MG OR CAPS Take by mouth daily       flecainide (TAMBOCOR) 150 MG tablet Take 1 tablet (150 mg) by mouth 2 times daily 180 tablet 3    GLUCOSAMINE 500 MG OR CAPS 1500 QD  0    ketoconazole (NIZORAL) 2 % external shampoo WASH AFFECTED AREAS ON FACE,SCALP & BEARD 2 TIMES PER DAY.LATHER & LET SIT 5 MINUTES BEFORE RINSING.      metoprolol succinate ER (TOPROL XL) 25 MG 24 hr tablet Take 0.5 tablets (12.5 mg) by mouth 2 times daily 90 tablet 3    metroNIDAZOLE (METROCREAM) 0.75 % external cream APPLY A THIN LAYER TO THE ENTIRE FACE 1 - 2 TIMES DAILY      rosuvastatin (CRESTOR) 5 MG tablet Take 1 tablet (5 mg) by mouth daily 90 tablet 3    sildenafil (VIAGRA) 100 MG tablet TAKE ONE TABLET BY MOUTH DAILY AS NEEDED FOR ERECTYLE DISFUNCTION. DO NOT TAKE WITH NITRO, TERAZOSIN, OR DOXAZOSIN 6 tablet 5    hydrOXYzine (ATARAX) 25 MG tablet TAKE 1 TABLET (25 MG) BY MOUTH 3 TIMES DAILY AS NEEDED FOR ANXIETY (Patient not taking: Reported on 4/19/2023) 30 tablet 2            Past Medical History     Past Medical History:   Diagnosis Date    Aortic regurgitation     moderate    New onset atrial fibrillation (H)     Varicose veins of lower extremities with inflammation      Past Surgical History:   Procedure Laterality Date    APPENDECTOMY  Age 20    DAVINCI HERNIORRHAPHY INGUINAL Left 8/18/2020    Procedure: ROBOTIC ASSISTED  LEFT INGUINAL HERNIA REPAIR WITH MESH;  Surgeon: Keshav Mehta MD;  Location: [facility] OR    LIGATE VEIN      Varicose veins, several times    TONSILLECTOMY & ADENOIDECTOMY  Approx age 10    [facility] LAP,HERNIA REPAIR PROC,UNLIST  2002    [facility] NONSPECIFIC PROCEDURE  2001    vein stripping     Family History   Problem Relation Age of Onset    Eye Disorder Mother         glaucoma    Cancer Sister         breast Ca, diagnosed age 49    Cardiovascular Father         Abdominal aortic aneurysm    C.A.D. No family hx of     Cancer - colorectal No family hx of     Prostate Cancer No family hx of      Social History     Socioeconomic History    Marital status:      Spouse name: Mary    Number of children: 2    Years of education: Not on file    Highest education level: Not on file   Occupational History     Employer: RETIRED     Comment: Worked as a pharmacist   Tobacco Use    Smoking status: Never    Smokeless tobacco: Never   Substance and Sexual Activity    Alcohol use: Yes     Alcohol/week: 0.0 standard drinks of alcohol     Comment: 1 drink per week    Drug use: No    Sexual activity: Yes     Partners: Female     Birth control/protection: None   Other Topics Concern     Service Not Asked    Blood Transfusions Not Asked    Caffeine Concern Not Asked    Occupational Exposure Not Asked    Hobby Hazards Not Asked    Sleep Concern Not Asked    Stress Concern Not Asked    Weight Concern Not Asked    Special Diet Not Asked    Back Care No    Exercise Yes     Comment: daily walk and ride bike    Bike Helmet Yes    Seat Belt Yes    Self-Exams Not Asked    Parent/sibling w/ CABG, MI or angioplasty before 65F 55M? Not Asked   Social History Narrative    Not on file     Social Determinants of Health     Financial Resource Strain: Low Risk  (4/19/2023)    Overall Financial Resource Strain (CARDIA)     Difficulty of Paying Living Expenses: Not hard at all   Food Insecurity: No Food Insecurity (4/19/2023)    Hunger Vital  Sign     Worried About Running Out of Food in the Last Year: Never true     Ran Out of Food in the Last Year: Never true   Transportation Needs: No Transportation Needs (4/19/2023)    PRAPARE - Transportation     Lack of Transportation (Medical): No     Lack of Transportation (Non-Medical): No   Physical Activity: Sufficiently Active (4/19/2023)    Exercise Vital Sign     Days of Exercise per Week: 7 days     Minutes of Exercise per Session: 150+ min   Stress: No Stress Concern Present (4/19/2023)    [employer] - Occupational Stress Questionnaire     Feeling of Stress : Only a little   Social Connections: Socially Integrated (4/19/2023)    Social Connection and Isolation Panel [NHANES]     Frequency of Communication with Friends and Family: More than three times a week     Frequency of Social Gatherings with Friends and Family: Once a week     Attends Baptism Services: More than 4 times per year     Active Member of Clubs or Organizations: Yes     Attends Club or Organization Meetings: Not on file     Marital Status:    Interpersonal Safety: Not on file   Housing Stability: Low Risk  (4/19/2023)    Housing Stability Vital Sign     Unable to Pay for Housing in the Last Year: No     Number of Places Lived in the Last Year: 1     Unstable Housing in the Last Year: No            Allergies   No known drug allergy    Today's clinic visit entailed:  Review of the result(s) of each unique test - EKG, echocardiogram, BMP, CBC  Prescription drug management  I spent a total of 40 minutes on the day of the visit.   Time spent by me doing chart review, history and exam, documentation and further activities per the note  Provider  Link to MDM Help Grid     The level of medical decision making during this visit was of high complexity.

## 2023-10-20 NOTE — PATIENT INSTRUCTIONS
Today's Plan:   START metoprolol 12.5 mg (1/2 tab) twice daily.   Stop propranolol.   [employer] mobile monitor.   RN call for update in two weeks.  Schedule a follow up with  when able.     If you have questions or concerns please call my nurse team at [phone].   Scheduling phone number: [phone]  For after hours urgent concerns call [phone] option 2.   Reminder: Please bring in all current medications, over the counter supplements and vitamin bottles to your next appointment.    It was a pleasure seeing you today!     Evette Maldonado PA-C

## 2023-10-20 NOTE — LETTER
10/20/2023    Abram Villanueva MD  [address] [place] [address]    RE: Keith Kevin       Dear Colleague,     I had the pleasure of seeing Keith Kevin in the [facility].    Electrophysiology Clinic Progress Note    Keith Kevin MRN# [id number]   YOB: 1950 Age: 73 year old     Primary cardiology team: Was Dr. Dubon         Assessment and Plan     In summary, Keith Kevin presents today for a routine annual follow up visit regarding persistent AF, on flecainide with an acceptable QRS, appropriately anticoagulated. He does have fairly frequent breakthrough arrhythmias, with documented atrial flutter in August, always rate controlled per his Fitbit monitor. I have advised him to stop his as needed propranolol, and start Toprol XL 12.5 mg BID to help avoid flutter recurrence.  I have also asked him to start monitoring more closely with a Kardia[employer] monitor.  If this helps to quiet things down, we can consider giving him an extra 150 mg of flecainide here and there for rare breakthrough episodes to convert him.  If he continues to have frequent episodes, then we may want to change him to Tikosyn.  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  I did also discuss ablation with the patient, he is hesitant.    I will have my nurses follow-up with him in 2 weeks to see how the current regimen is going.  If he continues to have frequent episodes of AF, Dr. Gutiérrez recommends changing him to Tikosyn, which would require inpatient loading (QTc 427 msec, normal renal fn).    It was a pleasure meeting Harshil today!    SHONNA Newman [facility] - Heart Clinic         History of Presenting Illness     Keith Kevin is a pleasant 73 year old patient with a pertinent history of persistent AF on flecainide, anticoagulated with Eliquis.    Today, I am meeting Harshil along with his wife, they are both very pleasant. He's been having  "break-through AF/AFL intermittently can occur several times within a week, every other week, or every couple months. His longest recent episode lasted 8 days, that was in August. Since then his longest episode has been 2 days.  They always last greater than 24 hours in duration.  He does sometimes take a dose of his as needed propranolol which helps to limit the duration of his episodes.  Almost always comes on at night. Avid exerciser along with his wife. Feels a \"catch\" in his throat and some fatigue during episodes.  Sometimes triggered by stressful situations, but many times no clear trigger.  He drinks minimal alcohol and caffeine, hydrating appropriately.  Patient denies chest pain, shortness of breath, orthopnea, edema, near syncope or syncope.     EKG today shows Sinus rhythm at 64 bpm with first-degree AV block ( ms), and QRS of 106 ms, QTc 427 msec.  Recent TTE showed EF 60-65%, mild-mod AI, mild-mod [place].   Labs from April show a normal BMP and CBC.         Review of Systems     12-pt ROS is negative except for as noted in the HPI.          Physical Exam     Vitals: /75   Pulse 63   Ht 1.803 m (5' 11\")   Wt 84.8 kg (187 lb)   SpO2 97%   BMI 26.08 kg/m    Wt Readings from Last 10 Encounters:   10/20/23 84.8 kg (187 lb)   09/15/23 84.3 kg (185 lb 14.4 oz)   04/19/23 83.1 kg (183 lb 3.2 oz)   10/18/22 84.1 kg (185 lb 8 oz)   08/18/22 82.5 kg (181 lb 12.8 oz)   03/18/22 81.6 kg (180 lb)   08/03/21 78.9 kg (174 lb)   07/08/21 78.2 kg (172 lb 4.8 oz)   08/18/20 79.3 kg (174 lb 12.8 oz)   08/12/20 80.3 kg (177 lb)       Constitutional:  Patient is pleasant, alert, cooperative, and in NAD.  HEENT:  NCAT. PERRLA. EOM's intact.   Neck:  CVP appears normal. No carotid bruits.   Pulmonary: Normal respiratory effort. CTAB.   Cardiac: RRR, normal S1/S2, no S3/S4, no murmur or rub.   Abdomen:  Non-tender abdomen, no hepatosplenomegaly appreciated.   Vascular: Pulses in the upper and lower extremities are " "2+ and equal bilaterally.  Extremities: No edema, erythema, cyanosis or tenderness appreciated.  Skin:  No rashes or lesions appreciated.   Neurological:  No gross motor or sensory deficits.   Psych: Appropriate affect.          Data   Labs reviewed:  Recent Labs   Lab Test 06/16/23  0917 04/19/23  1002 07/08/21  1402 02/28/20  1010   LDL 82 157*  --  152*   HDL 61 62  --  62   NHDL 93 170*  --  167*   CHOL 154 232*  --  229*   TRIG 54 67  --  74   TSH  --  1.45 1.04  --        Lab Results   Component Value Date    WBC 4.5 04/19/2023    WBC 7.1 07/08/2021    RBC 4.75 04/19/2023    RBC 4.80 07/08/2021    HGB 15.5 04/19/2023    HGB 15.7 07/08/2021    HCT 45.6 04/19/2023    HCT 46.0 07/08/2021    MCV 96 04/19/2023    MCV 96 07/08/2021    MCH 32.6 04/19/2023    MCH 32.7 07/08/2021    MCHC 34.0 04/19/2023    MCHC 34.1 07/08/2021    RDW 12.7 04/19/2023    RDW 12.9 07/08/2021     04/19/2023     07/08/2021       Lab Results   Component Value Date     04/19/2023     07/08/2021    POTASSIUM 4.6 04/19/2023    POTASSIUM 4.0 07/08/2021    CHLORIDE 105 04/19/2023    CHLORIDE 108 07/08/2021    CO2 23 04/19/2023    CO2 22 07/08/2021    ANIONGAP 12 04/19/2023    ANIONGAP 8 07/08/2021    GLC 93 04/19/2023     (H) 07/08/2021    BUN 20.5 04/19/2023    BUN 21 07/08/2021    CR 1.01 04/19/2023    CR 0.97 07/08/2021    GFRESTIMATED 79 04/19/2023    GFRESTIMATED 78 07/08/2021    GFRESTBLACK >90 07/08/2021    STEPHEN 9.8 04/19/2023    STEPHEN 9.1 07/08/2021      Lab Results   Component Value Date    AST 32 02/08/2010    ALT 17 06/16/2023    ALT 18 02/08/2010       No results found for: \"A1C\"    No results found for: \"INR\"         Problem List     Patient Active Problem List   Diagnosis    Hyperlipidemia LDL goal <130    External hemorrhoids    Varicose veins of both lower extremities    Acute reaction to stress    Left inguinal hernia    Paroxysmal atrial fibrillation (H)            Medications     Current Outpatient " Medications   Medication Sig Dispense Refill    apixaban ANTICOAGULANT (ELIQUIS ANTICOAGULANT) 5 MG tablet Take 1 tablet (5 mg) by mouth 2 times daily 180 tablet 3    benzonatate (TESSALON) 200 MG capsule Take 1 capsule (200 mg) by mouth 3 times daily as needed for cough 20 capsule 0    calcium carbonate (TUMS) 500 MG chewable tablet Take 1 tablet by mouth daily. 100 tablet 3    Calcium-Magnesium-Zinc 333-133-5 MG TABS per tablet Take by mouth daily       Cholecalciferol (VITAMIN D) 1000 UNIT capsule Take 1 capsule by mouth daily. 100 capsule 12    clobetasol (TEMOVATE) 0.05 % external solution APPLY TO AFFECTED AREAS OF THE SCALP TWICE DAILY FOR 2 WEEKS, TWICE WEEKLY THEREAFTER      clonazePAM (KLONOPIN) 0.5 MG tablet Take 1 tablet (0.5 mg) by mouth nightly as needed (REM behavior disorder) 30 tablet 1    FISH OIL 1200 MG OR CAPS Take by mouth daily       flecainide (TAMBOCOR) 150 MG tablet Take 1 tablet (150 mg) by mouth 2 times daily 180 tablet 3    GLUCOSAMINE 500 MG OR CAPS 1500 QD  0    ketoconazole (NIZORAL) 2 % external shampoo WASH AFFECTED AREAS ON FACE,SCALP & BEARD 2 TIMES PER DAY.LATHER & LET SIT 5 MINUTES BEFORE RINSING.      metoprolol succinate ER (TOPROL XL) 25 MG 24 hr tablet Take 0.5 tablets (12.5 mg) by mouth 2 times daily 90 tablet 3    metroNIDAZOLE (METROCREAM) 0.75 % external cream APPLY A THIN LAYER TO THE ENTIRE FACE 1 - 2 TIMES DAILY      rosuvastatin (CRESTOR) 5 MG tablet Take 1 tablet (5 mg) by mouth daily 90 tablet 3    sildenafil (VIAGRA) 100 MG tablet TAKE ONE TABLET BY MOUTH DAILY AS NEEDED FOR ERECTYLE DISFUNCTION. DO NOT TAKE WITH NITRO, TERAZOSIN, OR DOXAZOSIN 6 tablet 5    hydrOXYzine (ATARAX) 25 MG tablet TAKE 1 TABLET (25 MG) BY MOUTH 3 TIMES DAILY AS NEEDED FOR ANXIETY (Patient not taking: Reported on 4/19/2023) 30 tablet 2            Past Medical History     Past Medical History:   Diagnosis Date    Aortic regurgitation     moderate    New onset atrial fibrillation (H)      Varicose veins of lower extremities with inflammation      Past Surgical History:   Procedure Laterality Date    APPENDECTOMY  Age 20    DAVINCI HERNIORRHAPHY INGUINAL Left 8/18/2020    Procedure: ROBOTIC ASSISTED LEFT INGUINAL HERNIA REPAIR WITH MESH;  Surgeon: Keshav Mehta MD;  Location: [facility] OR    LIGATE VEIN      Varicose veins, several times    TONSILLECTOMY & ADENOIDECTOMY  Approx age 10    [facility]C LAP,HERNIA REPAIR PROC,UNLIST  2002    [facility] NONSPECIFIC PROCEDURE  2001    vein stripping     Family History   Problem Relation Age of Onset    Eye Disorder Mother         glaucoma    Cancer Sister         breast Ca, diagnosed age 49    Cardiovascular Father         Abdominal aortic aneurysm    C.A.D. No family hx of     Cancer - colorectal No family hx of     Prostate Cancer No family hx of      Social History     Socioeconomic History    Marital status:      Spouse name: Mary    Number of children: 2    Years of education: Not on file    Highest education level: Not on file   Occupational History     Employer: RETIRED     Comment: Worked as a pharmacist   Tobacco Use    Smoking status: Never    Smokeless tobacco: Never   Substance and Sexual Activity    Alcohol use: Yes     Alcohol/week: 0.0 standard drinks of alcohol     Comment: 1 drink per week    Drug use: No    Sexual activity: Yes     Partners: Female     Birth control/protection: None   Other Topics Concern     Service Not Asked    Blood Transfusions Not Asked    Caffeine Concern Not Asked    Occupational Exposure Not Asked    Hobby Hazards Not Asked    Sleep Concern Not Asked    Stress Concern Not Asked    Weight Concern Not Asked    Special Diet Not Asked    Back Care No    Exercise Yes     Comment: daily walk and ride bike    Bike Helmet Yes    Seat Belt Yes    Self-Exams Not Asked    Parent/sibling w/ CABG, MI or angioplasty before 65F 55M? Not Asked   Social History Narrative    Not on file     Social Determinants of Health      Financial Resource Strain: Low Risk  (4/19/2023)    Overall Financial Resource Strain (CARDIA)     Difficulty of Paying Living Expenses: Not hard at all   Food Insecurity: No Food Insecurity (4/19/2023)    Hunger Vital Sign     Worried About Running Out of Food in the Last Year: Never true     Ran Out of Food in the Last Year: Never true   Transportation Needs: No Transportation Needs (4/19/2023)    PRAPARE - Transportation     Lack of Transportation (Medical): No     Lack of Transportation (Non-Medical): No   Physical Activity: Sufficiently Active (4/19/2023)    Exercise Vital Sign     Days of Exercise per Week: 7 days     Minutes of Exercise per Session: 150+ min   Stress: No Stress Concern Present (4/19/2023)    [employer] - Occupational Stress Questionnaire     Feeling of Stress : Only a little   Social Connections: Socially Integrated (4/19/2023)    Social Connection and Isolation Panel [NHANES]     Frequency of Communication with Friends and Family: More than three times a week     Frequency of Social Gatherings with Friends and Family: Once a week     Attends Evangelical Services: More than 4 times per year     Active Member of Clubs or Organizations: Yes     Attends Club or Organization Meetings: Not on file     Marital Status:    Interpersonal Safety: Not on file   Housing Stability: Low Risk  (4/19/2023)    Housing Stability Vital Sign     Unable to Pay for Housing in the Last Year: No     Number of Places Lived in the Last Year: 1     Unstable Housing in the Last Year: No            Allergies   No known drug allergy    Today's clinic visit entailed:  Review of the result(s) of each unique test - EKG, echocardiogram, BMP, CBC  Prescription drug management  I spent a total of 40 minutes on the day of the visit.   Time spent by me doing chart review, history and exam, documentation and further activities per the note  Provider  Link to [facility] Help Grid     The level of  medical decision making during this visit was of high complexity.      Thank you for allowing me to participate in the care of your patient.      Sincerely,     SHONNA Newman [facility]  cc:   Francisco Dubon MD

## 2023-11-01 DIAGNOSIS — N52.9 ERECTILE DYSFUNCTION, UNSPECIFIED ERECTILE DYSFUNCTION TYPE: ICD-10-CM

## 2023-11-02 RX ORDER — SILDENAFIL 100 MG/1
TABLET, FILM COATED ORAL
Qty: 6 TABLET | Refills: 5 | Status: SHIPPED | OUTPATIENT
Start: 2023-11-02 | End: 2024-03-11

## 2023-11-06 ASSESSMENT — SLEEP AND FATIGUE QUESTIONNAIRES
HOW LIKELY ARE YOU TO NOD OFF OR FALL ASLEEP IN A CAR, WHILE STOPPED FOR A FEW MINUTES IN TRAFFIC: WOULD NEVER DOZE
HOW LIKELY ARE YOU TO NOD OFF OR FALL ASLEEP WHILE LYING DOWN TO REST IN THE AFTERNOON WHEN CIRCUMSTANCES PERMIT: MODERATE CHANCE OF DOZING
HOW LIKELY ARE YOU TO NOD OFF OR FALL ASLEEP WHILE SITTING AND TALKING TO SOMEONE: WOULD NEVER DOZE
HOW LIKELY ARE YOU TO NOD OFF OR FALL ASLEEP WHILE WATCHING TV: WOULD NEVER DOZE
HOW LIKELY ARE YOU TO NOD OFF OR FALL ASLEEP WHEN YOU ARE A PASSENGER IN A CAR FOR AN HOUR WITHOUT A BREAK: WOULD NEVER DOZE
HOW LIKELY ARE YOU TO NOD OFF OR FALL ASLEEP WHILE SITTING INACTIVE IN A PUBLIC PLACE: SLIGHT CHANCE OF DOZING
HOW LIKELY ARE YOU TO NOD OFF OR FALL ASLEEP WHILE SITTING AND READING: WOULD NEVER DOZE
HOW LIKELY ARE YOU TO NOD OFF OR FALL ASLEEP WHILE SITTING QUIETLY AFTER LUNCH WITHOUT ALCOHOL: WOULD NEVER DOZE

## 2023-11-07 ENCOUNTER — VIRTUAL VISIT (OUTPATIENT)
Dept: SLEEP MEDICINE | Facility: CLINIC | Age: 73
End: 2023-11-07
Payer: COMMERCIAL

## 2023-11-07 DIAGNOSIS — G47.52 RBD (REM BEHAVIORAL DISORDER): Primary | ICD-10-CM

## 2023-11-07 PROCEDURE — 99213 OFFICE O/P EST LOW 20 MIN: CPT | Mod: VID | Performed by: PHYSICIAN ASSISTANT

## 2023-11-07 RX ORDER — CLONAZEPAM 0.5 MG/1
0.5 TABLET ORAL
Qty: 30 TABLET | Refills: 3 | Status: SHIPPED | OUTPATIENT
Start: 2023-11-07 | End: 2024-03-06

## 2023-11-07 NOTE — PROGRESS NOTES
Does Keith have a CPAP/Bipap?  No     [place] Sleep Scale:  3    Harshil is a 73 year old who is being evaluated via a billable video visit.      How would you like to obtain your AVS? MyChart  If the video visit is dropped, the invitation should be resent by: Text to cell phone: [phone]  Will anyone else be joining your video visit? No            Subjective   Harshil is a 73 year old, presenting for the following health issues:  Sleep Problem (clonazePAM (KLONOPIN) 0.5 MG tablet)          Objective           Vitals:  No vitals were obtained today due to virtual visit.    Physical Exam   GENERAL: Healthy, alert and no distress  EYES: Eyes grossly normal to inspection.  No discharge or erythema, or obvious scleral/conjunctival abnormalities.  RESP: No audible wheeze, cough, or visible cyanosis.  No visible retractions or increased work of breathing.    SKIN: Visible skin clear. No significant rash, abnormal pigmentation or lesions.  NEURO: Cranial nerves grossly intact.  Mentation and speech appropriate for age.  PSYCH: Mentation appears normal, affect normal/bright, judgement and insight intact, normal speech and appearance well-groomed.                Video-Visit Details    Type of service:  Video Visit   Video Start Time:  3:21  Video End Time:3:40PM    Originating Location (pt. Location): Home    Distant Location (provider location):  On-site  Platform used for Video Visit: Cardpool    SUBJECTIVE: Keith Kevin  73 year old  male. who presents with REM Behavior Disorder. He was striking out and vocalizing in his sleep. Patient has had these signs/symptoms for 2 years. Patient has tried melatonin with some success, but symptoms did worsen. Clonazepam 0.5 MG was initiated and has been quite effective. He has had 2 episodes which were mild in nature. He is concerned about the association between REM behavior disorder and Parkinson's and dementia. He wants to know if he should see neurology. He denies any symptoms at  this time.   Past Medical History:   Diagnosis Date    Aortic regurgitation     moderate    New onset atrial fibrillation (H)     Varicose veins of lower extremities with inflammation       Past Surgical History:   Procedure Laterality Date    APPENDECTOMY  Age 20    DAVINCI HERNIORRHAPHY INGUINAL Left 8/18/2020    Procedure: ROBOTIC ASSISTED LEFT INGUINAL HERNIA REPAIR WITH MESH;  Surgeon: Keshav Mehta MD;  Location: [facility] OR    LIGATE VEIN      Varicose veins, several times    TONSILLECTOMY & ADENOIDECTOMY  Approx age 10    [facility] LAP,HERNIA REPAIR PROC,UNLIST  2002    [facility] NONSPECIFIC PROCEDURE  2001    vein stripping   .   Current Outpatient Medications   Medication Sig Dispense Refill    apixaban ANTICOAGULANT (ELIQUIS ANTICOAGULANT) 5 MG tablet Take 1 tablet (5 mg) by mouth 2 times daily 180 tablet 3    benzonatate (TESSALON) 200 MG capsule Take 1 capsule (200 mg) by mouth 3 times daily as needed for cough 20 capsule 0    calcium carbonate (TUMS) 500 MG chewable tablet Take 1 tablet by mouth daily. 100 tablet 3    Calcium-Magnesium-Zinc 333-133-5 MG TABS per tablet Take by mouth daily       Cholecalciferol (VITAMIN D) 1000 UNIT capsule Take 1 capsule by mouth daily. 100 capsule 12    clobetasol (TEMOVATE) 0.05 % external solution APPLY TO AFFECTED AREAS OF THE SCALP TWICE DAILY FOR 2 WEEKS, TWICE WEEKLY THEREAFTER      clonazePAM (KLONOPIN) 0.5 MG tablet Take 1 tablet (0.5 mg) by mouth nightly as needed (REM behavior disorder) 30 tablet 1    FISH OIL 1200 MG OR CAPS Take by mouth daily       flecainide (TAMBOCOR) 150 MG tablet Take 1 tablet (150 mg) by mouth 2 times daily 180 tablet 3    GLUCOSAMINE 500 MG OR CAPS 1500 QD  0    hydrOXYzine (ATARAX) 25 MG tablet TAKE 1 TABLET (25 MG) BY MOUTH 3 TIMES DAILY AS NEEDED FOR ANXIETY (Patient not taking: Reported on 4/19/2023) 30 tablet 2    ketoconazole (NIZORAL) 2 % external shampoo WASH AFFECTED AREAS ON FACE,SCALP & BEARD 2 TIMES PER DAY.LATHER & LET SIT 5 MINUTES  BEFORE RINSING.      metoprolol succinate ER (TOPROL XL) 25 MG 24 hr tablet Take 0.5 tablets (12.5 mg) by mouth daily 45 tablet 3    metroNIDAZOLE (METROCREAM) 0.75 % external cream APPLY A THIN LAYER TO THE ENTIRE FACE 1 - 2 TIMES DAILY      rosuvastatin (CRESTOR) 5 MG tablet Take 1 tablet (5 mg) by mouth daily 90 tablet 3    sildenafil (VIAGRA) 100 MG tablet TAKE ONE TABLET BY MOUTH DAILY AS NEEDED FOR ERECTILE DYSFUNCTION. DO NOT TAKE WITH NITRO, TERAZOSIN, OR DOXAZOSIN. 6 tablet 5      OBJECTIVE:  There were no vitals taken for this visit.   General: patient is in no apparent distress, alert and oriented times three.  Head normocephalic, hair normal  Skin normal.   Respiratory: No obvious distress, speaks in full sentences.   ASSESSMENT /PLAN:  See encounter notes and orders.    REM behavior disorder- Clonazepam is effective, will continue.  Sleep has been good, no symptoms of underlying sleep disorder. He is encouraged to speak with neurology for a good baseline comparison if he does develop symptoms of Parkinson's or dementia.

## 2023-11-15 ENCOUNTER — HOSPITAL ENCOUNTER (OUTPATIENT)
Dept: NUCLEAR MEDICINE | Facility: CLINIC | Age: 73
Setting detail: NUCLEAR MEDICINE
Discharge: HOME | End: 2023-11-15
Attending: PHYSICIAN ASSISTANT
Payer: COMMERCIAL

## 2023-11-15 ENCOUNTER — HOSPITAL ENCOUNTER (OUTPATIENT)
Dept: CARDIOLOGY | Facility: CLINIC | Age: 73
Discharge: HOME | End: 2023-11-15
Attending: PHYSICIAN ASSISTANT
Payer: COMMERCIAL

## 2023-11-15 DIAGNOSIS — R07.9 CHEST PAIN: ICD-10-CM

## 2023-11-15 LAB
CV BLOOD PRESSURE: 50 MMHG
CV STRESS MAX HR HE: 137
NUC STRESS EJECTION FRACTION: 45 %
RATE PRESSURE PRODUCT: NORMAL
STRESS ANGINA INDEX: 1
STRESS ECHO BASELINE DIASTOLIC HE: 66
STRESS ECHO BASELINE HR: 65 BPM
STRESS ECHO BASELINE SYSTOLIC BP: 122
STRESS ECHO CALCULATED PERCENT HR: 94 %
STRESS ECHO LAST STRESS DIASTOLIC BP: 64
STRESS ECHO LAST STRESS SYSTOLIC BP: 150
STRESS ECHO POST ESTIMATED WORKLOAD: 13.7 METS
STRESS ECHO POST EXERCISE DUR MIN: 11 MIN
STRESS ECHO POST EXERCISE DUR SEC: 11 SEC
STRESS ECHO TARGET HR: 146

## 2023-11-15 PROCEDURE — 343N000001 HC RX 343: Performed by: PHYSICIAN ASSISTANT

## 2023-11-15 PROCEDURE — 93016 CV STRESS TEST SUPVJ ONLY: CPT | Performed by: INTERNAL MEDICINE

## 2023-11-15 PROCEDURE — A9500 TC99M SESTAMIBI: HCPCS | Performed by: PHYSICIAN ASSISTANT

## 2023-11-15 PROCEDURE — 93017 CV STRESS TEST TRACING ONLY: CPT

## 2023-11-15 PROCEDURE — 78452 HT MUSCLE IMAGE SPECT MULT: CPT | Mod: 26 | Performed by: INTERNAL MEDICINE

## 2023-11-15 PROCEDURE — 93018 CV STRESS TEST I&R ONLY: CPT | Performed by: INTERNAL MEDICINE

## 2023-11-15 PROCEDURE — 78452 HT MUSCLE IMAGE SPECT MULT: CPT

## 2023-11-15 RX ADMIN — Medication 33 MILLICURIE: at 12:27

## 2023-11-15 RX ADMIN — Medication 10.9 MILLICURIE: at 10:56

## 2023-11-20 ENCOUNTER — TELEPHONE (OUTPATIENT)
Dept: CARDIOLOGY | Facility: CLINIC | Age: 73
End: 2023-11-20
Payer: COMMERCIAL

## 2023-11-20 DIAGNOSIS — R07.9 CHEST PAIN, UNSPECIFIED TYPE: Primary | ICD-10-CM

## 2023-11-20 DIAGNOSIS — I48.0 PAROXYSMAL ATRIAL FIBRILLATION (H): ICD-10-CM

## 2023-11-20 RX ORDER — METOPROLOL SUCCINATE 25 MG/1
25 TABLET, EXTENDED RELEASE ORAL DAILY
COMMUNITY
Start: 2023-11-20 | End: 2024-01-12

## 2023-11-20 RX ORDER — AMLODIPINE BESYLATE 2.5 MG/1
2.5 TABLET ORAL DAILY
Qty: 90 TABLET | Refills: 3 | Status: SHIPPED | OUTPATIENT
Start: 2023-11-20 | End: 2024-01-10

## 2023-11-20 NOTE — TELEPHONE ENCOUNTER
11/20/23 Msg recd from Evette Maldonado, Evette Lemon, Fred Bell MD  Cc: DIMAS [facility] Heart Ep Nurse; Jenelle Littlejohn  I am on vacation but am seeing this abnormal result in my basket. Stress test was performed for complaint of intermittent chest discomfort and also wanted to rule out proarrhythmia for this patient on flecainide with no prior stress. Given the LBBB formation, chest pain, and question of infarct, I called Harshil and instructed him to hold flecainide and increase metoprolol to 25 mg daily, avoid exertion. Discussed coronary angiogram and he would like to proceed. He is aware someone will call him Monday to arrange the procedure. Pending results should consider placing him back on flecainide, admitting for tikosyn or pursuing ablation. Will review with Dr. Gutiérrez as well. Former Dr. Dubon patient.      Dr Gutiérrez replied   Fred Gutiérrez MD [facility] Heart Ep Nurse  Please inform pt that stress test is equivocal. He has been in persistent AF despite being on flecainide. I believe Evette already called the patient to stop Flecainide. Chest pain could be from being in AF or true ischemia. Would try imdur 30 mg daily for now and reassess in a few weeks. If still having sxs will consider admitting him for dofetilide and cardioversion.              Routing to Evette Maldonado to discuss plan    KHerroRN 1040 am

## 2023-11-20 NOTE — TELEPHONE ENCOUNTER
"Spoke with pt and relayed Dr. Gutiérrez's plan. He prefers not to be on Imdur chronically as he takes Viagra. We will do amlodipine 2.5 mg daily instead.     States he felt \"great\" over the weekend - actually converted back out of AF after he stopped the flecainide and went up on the metoprolol. Discussed tentative plan for tioksyn admit - he'll review the medication and we can discuss it further when he sees me in clinic next week. I'll see him 11/29 at 11a.     Evette Maldonado PA-C  [facility] - Heart Clinic    "

## 2023-11-29 ENCOUNTER — OFFICE VISIT (OUTPATIENT)
Dept: CARDIOLOGY | Facility: CLINIC | Age: 73
End: 2023-11-29
Payer: COMMERCIAL

## 2023-11-29 VITALS
BODY MASS INDEX: 25.9 KG/M2 | DIASTOLIC BLOOD PRESSURE: 60 MMHG | HEART RATE: 58 BPM | HEIGHT: 71 IN | WEIGHT: 185 LBS | SYSTOLIC BLOOD PRESSURE: 122 MMHG

## 2023-11-29 DIAGNOSIS — I48.0 PAROXYSMAL ATRIAL FIBRILLATION (H): Primary | ICD-10-CM

## 2023-11-29 PROCEDURE — 93000 ELECTROCARDIOGRAM COMPLETE: CPT | Performed by: PHYSICIAN ASSISTANT

## 2023-11-29 PROCEDURE — 99215 OFFICE O/P EST HI 40 MIN: CPT | Performed by: PHYSICIAN ASSISTANT

## 2023-11-29 NOTE — PROGRESS NOTES
"  Electrophysiology Clinic Progress Note    Keith Kevin MRN# [id number]   YOB: 1950 Age: 73 year old     Primary cardiology team: Was Dr. Dubon / will be Dr. Gutiérrez         Assessment and Plan     In summary, Keith Kevin presents today for follow-up after discontinuation of flecainide and initiation of metoprolol in the setting of abnormal stress test.  He was having atypical chest pain symptoms, for which we started amlodipine.  Today, he feels significantly better. He's had just one rate controlled episode of AF in the past month with mild associated symptoms.     We discussed hospital admission for Tikosyn loading vs AF ablation again today, but he feels this is not warranted at the time. He will continue to monitor his symptoms and let me know if things progress. In that case, he would be interested in taking the next steps.     He is scheduled to see Dr. Gutiérrez in 3 months, but I'm happy to see him sooner if needed. It was a pleasure seeing Harshil and his wife again today!    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    I met Harshil along with his wife in October for a routine annual follow up visit. He'd been having break-through AF/AFL intermittently which could occur several times within a week, every other week, or every couple months. His longest recent episode had lasted 8 days, that was in August. Since then his longest episode had been 2 days.  They will always last greater than 24 hours in duration, and almost always come on at night, associated with a \"catch\" in his throat and fatigue.  He would sometimes take a dose of his as needed propranolol which helped to limit the duration of his episodes.  Sometimes triggered by stressful situations, but many times no clear trigger.  I stopped his PRN propranolol and started " scheduled Toprol XL 12.5 mg daily. I told him that if he continued to have frequent episodes, then we may want to change him to Tikosyn vs ablation.    After that, he complained of some intermittent chest pain for which a stress test was performed.  There was no ischemia noted, however he developed a left bundle branch block during stress (at a heart rate of 130), chest discomfort, and there was question of inferior infarct however it was noted that this could be related to bundle pattern performed during stress.  His resting EF was 50%, stress EF 45%.  Finally, he was in A-fib during the study.  I called him and stopped his flecainide, increased metoprolol to 25 mg daily, and recommended coronary angiogram procedure.  However, on further review with Dr. Gutiérrez, he felt that the abnormalities were likely related to his rate related bundle branch block and that it was unclear whether the chest pain was related to his atrial fibrillation or ischemia.  He recommended a trial of antianginal therapy for the time being.  Due to Harshil's hesitation to take nitrate therapy as he takes Viagra, I started him on amlodipine.    Today, Harshil returns with his wife stating he feels better overall. He states both chest discomfort and dyspnea have significantly improved with the med changes. He did have some LOPEZ while in AF recently, but did some breathing exercises which helped. He's had only one episode since he saw me a little over a month ago, and that was after a BCC resection. That episode lasted two days, which has been typical for him. Fitbit HR is controlled in AF. Lowest rate has been in the upper 40's during sleep. No pattern to his chest discomfort, but again, that has also improved. Lasts maybe a few minutes when he does have it. Active without issue. Avid exerciser along with his wife. Patient denies orthopnea, edema, near syncope or syncope. He drinks minimal alcohol and caffeine, hydrating appropriately.      EKG today  "shows sinus bradycardia at 54 bpm, with a QTc of 418 ms.  10/2023 TTE showed EF 60-65%, mild-mod AI, mild-mod CA.   Labs from April show a normal BMP and CBC.         Review of Systems     12-pt ROS is negative except for as noted in the HPI.          Physical Exam     Vitals: /60   Pulse 58   Ht 1.803 m (5' 11\")   Wt 83.9 kg (185 lb)   BMI 25.80 kg/m    Wt Readings from Last 10 Encounters:   11/29/23 83.9 kg (185 lb)   10/20/23 84.8 kg (187 lb)   09/15/23 84.3 kg (185 lb 14.4 oz)   04/19/23 83.1 kg (183 lb 3.2 oz)   10/18/22 84.1 kg (185 lb 8 oz)   08/18/22 82.5 kg (181 lb 12.8 oz)   03/18/22 81.6 kg (180 lb)   08/03/21 78.9 kg (174 lb)   07/08/21 78.2 kg (172 lb 4.8 oz)   08/18/20 79.3 kg (174 lb 12.8 oz)       Constitutional:  Patient is pleasant, alert, cooperative, and in NAD.  HEENT:  NCAT. PERRLA. EOM's intact.   Neck:  CVP appears normal. No carotid bruits.   Pulmonary: Normal respiratory effort. CTAB.   Cardiac: RRR, normal S1/S2, no S3/S4, no murmur or rub.   Abdomen:  Non-tender abdomen, no hepatosplenomegaly appreciated.   Vascular: Pulses in the upper and lower extremities are 2+ and equal bilaterally.  Extremities: No edema, erythema, cyanosis or tenderness appreciated.  Skin:  No rashes or lesions appreciated.   Neurological:  No gross motor or sensory deficits.   Psych: Appropriate affect.          Data   Labs reviewed:  Recent Labs   Lab Test 06/16/23  0917 04/19/23  1002 07/08/21  1402 02/28/20  1010   LDL 82 157*  --  152*   HDL 61 62  --  62   NHDL 93 170*  --  167*   CHOL 154 232*  --  229*   TRIG 54 67  --  74   TSH  --  1.45 1.04  --        Lab Results   Component Value Date    WBC 4.5 04/19/2023    WBC 7.1 07/08/2021    RBC 4.75 04/19/2023    RBC 4.80 07/08/2021    HGB 15.5 04/19/2023    HGB 15.7 07/08/2021    HCT 45.6 04/19/2023    HCT 46.0 07/08/2021    MCV 96 04/19/2023    MCV 96 07/08/2021    MCH 32.6 04/19/2023    MCH 32.7 07/08/2021    MCHC 34.0 04/19/2023    MCHC 34.1 " "07/08/2021    RDW 12.7 04/19/2023    RDW 12.9 07/08/2021     04/19/2023     07/08/2021       Lab Results   Component Value Date     04/19/2023     07/08/2021    POTASSIUM 4.6 04/19/2023    POTASSIUM 4.0 07/08/2021    CHLORIDE 105 04/19/2023    CHLORIDE 108 07/08/2021    CO2 23 04/19/2023    CO2 22 07/08/2021    ANIONGAP 12 04/19/2023    ANIONGAP 8 07/08/2021    GLC 93 04/19/2023     (H) 07/08/2021    BUN 20.5 04/19/2023    BUN 21 07/08/2021    CR 1.01 04/19/2023    CR 0.97 07/08/2021    GFRESTIMATED 79 04/19/2023    GFRESTIMATED 78 07/08/2021    GFRESTBLACK >90 07/08/2021    STEPHEN 9.8 04/19/2023    STEPHEN 9.1 07/08/2021      Lab Results   Component Value Date    AST 32 02/08/2010    ALT 17 06/16/2023    ALT 18 02/08/2010       No results found for: \"A1C\"    No results found for: \"INR\"         Problem List     Patient Active Problem List   Diagnosis    Hyperlipidemia LDL goal <130    External hemorrhoids    Varicose veins of both lower extremities    Acute reaction to stress    Left inguinal hernia    Paroxysmal atrial fibrillation (H)            Medications     Current Outpatient Medications   Medication Sig Dispense Refill    amLODIPine (NORVASC) 2.5 MG tablet Take 1 tablet (2.5 mg) by mouth daily 90 tablet 3    apixaban ANTICOAGULANT (ELIQUIS ANTICOAGULANT) 5 MG tablet Take 1 tablet (5 mg) by mouth 2 times daily 180 tablet 3    calcium carbonate (TUMS) 500 MG chewable tablet Take 1 tablet by mouth daily. 100 tablet 3    Calcium-Magnesium-Zinc 333-133-5 MG TABS per tablet Take by mouth daily       Cholecalciferol (VITAMIN D) 1000 UNIT capsule Take 1 capsule by mouth daily. 100 capsule 12    clobetasol (TEMOVATE) 0.05 % external solution APPLY TO AFFECTED AREAS OF THE SCALP TWICE DAILY FOR 2 WEEKS, TWICE WEEKLY THEREAFTER      clonazePAM (KLONOPIN) 0.5 MG tablet Take 1 tablet (0.5 mg) by mouth nightly as needed (REM behavior disorder) 30 tablet 3    FISH OIL 1200 MG OR CAPS Take by mouth " daily       GLUCOSAMINE 500 MG OR CAPS 1500 QD  0    ketoconazole (NIZORAL) 2 % external shampoo WASH AFFECTED AREAS ON FACE,SCALP & BEARD 2 TIMES PER DAY.LATHER & LET SIT 5 MINUTES BEFORE RINSING.      metoprolol succinate ER (TOPROL XL) 25 MG 24 hr tablet Take 1 tablet (25 mg) by mouth daily      metroNIDAZOLE (METROCREAM) 0.75 % external cream APPLY A THIN LAYER TO THE ENTIRE FACE 1 - 2 TIMES DAILY      rosuvastatin (CRESTOR) 5 MG tablet Take 1 tablet (5 mg) by mouth daily 90 tablet 3    sildenafil (VIAGRA) 100 MG tablet TAKE ONE TABLET BY MOUTH DAILY AS NEEDED FOR ERECTILE DYSFUNCTION. DO NOT TAKE WITH NITRO, TERAZOSIN, OR DOXAZOSIN. 6 tablet 5            Past Medical History     Past Medical History:   Diagnosis Date    Aortic regurgitation     moderate    New onset atrial fibrillation (H)     Varicose veins of lower extremities with inflammation      Past Surgical History:   Procedure Laterality Date    APPENDECTOMY  Age 20    DAVINCI HERNIORRHAPHY INGUINAL Left 8/18/2020    Procedure: ROBOTIC ASSISTED LEFT INGUINAL HERNIA REPAIR WITH MESH;  Surgeon: Keshav Mehta MD;  Location: [facility] OR    LIGATE VEIN      Varicose veins, several times    TONSILLECTOMY & ADENOIDECTOMY  Approx age 10    Z LAP,HERNIA REPAIR PROC,UNLIST  2002    [facility] NONSPECIFIC PROCEDURE  2001    vein stripping     Family History   Problem Relation Age of Onset    Eye Disorder Mother         glaucoma    Cancer Sister         breast Ca, diagnosed age 49    Cardiovascular Father         Abdominal aortic aneurysm    C.A.D. No family hx of     Cancer - colorectal No family hx of     Prostate Cancer No family hx of      Social History     Socioeconomic History    Marital status:      Spouse name: Mary    Number of children: 2    Years of education: Not on file    Highest education level: Not on file   Occupational History     Employer: RETIRED     Comment: Worked as a pharmacist   Tobacco Use    Smoking status: Never    Smokeless  tobacco: Never   Substance and Sexual Activity    Alcohol use: Yes     Alcohol/week: 0.0 standard drinks of alcohol     Comment: 1 drink per week    Drug use: No    Sexual activity: Yes     Partners: Female     Birth control/protection: None   Other Topics Concern     Service Not Asked    Blood Transfusions Not Asked    Caffeine Concern Not Asked    Occupational Exposure Not Asked    Hobby Hazards Not Asked    Sleep Concern Not Asked    Stress Concern Not Asked    Weight Concern Not Asked    Special Diet Not Asked    Back Care No    Exercise Yes     Comment: daily walk and ride bike    Bike Helmet Yes    Seat Belt Yes    Self-Exams Not Asked    Parent/sibling w/ CABG, MI or angioplasty before 65F 55M? Not Asked   Social History Narrative    Not on file     Social Determinants of Health     Financial Resource Strain: Low Risk  (4/19/2023)    Overall Financial Resource Strain (CARDIA)     Difficulty of Paying Living Expenses: Not hard at all   Food Insecurity: No Food Insecurity (4/19/2023)    Hunger Vital Sign     Worried About Running Out of Food in the Last Year: Never true     Ran Out of Food in the Last Year: Never true   Transportation Needs: No Transportation Needs (4/19/2023)    PRAPARE - Transportation     Lack of Transportation (Medical): No     Lack of Transportation (Non-Medical): No   Physical Activity: Sufficiently Active (4/19/2023)    Exercise Vital Sign     Days of Exercise per Week: 7 days     Minutes of Exercise per Session: 150+ min   Stress: No Stress Concern Present (4/19/2023)    [employer] [place] - Occupational Stress Questionnaire     Feeling of Stress : Only a little   Social Connections: Socially Integrated (4/19/2023)    Social Connection and Isolation Panel [NHANES]     Frequency of Communication with Friends and Family: More than three times a week     Frequency of Social Gatherings with Friends and Family: Once a week     Attends [employer] Services: More  than 4 times per year     Active Member of Clubs or Organizations: Yes     Attends Club or Organization Meetings: Not on file     Marital Status:    Interpersonal Safety: Not on file   Housing Stability: Low Risk  (4/19/2023)    Housing Stability Vital Sign     Unable to Pay for Housing in the Last Year: No     Number of Places Lived in the Last Year: 1     Unstable Housing in the Last Year: No            Allergies   No known drug allergy    Today's clinic visit entailed:  Review of the result(s) of each unique test - EKG, stress test  I spent a total of 40 minutes on the day of the visit.   Time spent by me doing chart review, history and exam, documentation and further activities per the note  Provider  Link to MDM Help Grid     The level of medical decision making during this visit was of high complexity.

## 2023-11-29 NOTE — LETTER
"11/29/2023    Abram Villanueva MD  [address] [place] [address]    RE: Keith Kevin       Dear Colleague,     I had the pleasure of seeing Keith Kevin in the [facility].    Electrophysiology Clinic Progress Note    Keith Kevin MRN# [id number]   YOB: 1950 Age: 73 year old     Primary cardiology team: Was Dr. Dubon / will be Dr. Gutiérrez         Assessment and Plan     In summary, Keith Kevin presents today for follow-up after discontinuation of flecainide and initiation of metoprolol in the setting of abnormal stress test.  He was having atypical chest pain symptoms, for which we started amlodipine.  Today, he feels significantly better. He's had just one rate controlled episode of AF in the past month with mild associated symptoms.     We discussed hospital admission for Tikosyn loading vs AF ablation again today, but he feels this is not warranted at the time. He will continue to monitor his symptoms and let me know if things progress. In that case, he would be interested in taking the next steps.     He is scheduled to see Dr. Gutiérrez in 3 months, but I'm happy to see him sooner if needed. It was a pleasure seeing Harshil and his wife again today!    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    I met Harshil along with his wife in October for a routine annual follow up visit. He'd been having break-through AF/AFL intermittently which could occur several times within a week, every other week, or every couple months. His longest recent episode had lasted 8 days, that was in August. Since then his longest episode had been 2 days.  They will always last greater than 24 hours in duration, and almost always come on at night, associated with a \"catch\" in his throat and fatigue.  He would sometimes take a " dose of his as needed propranolol which helped to limit the duration of his episodes.  Sometimes triggered by stressful situations, but many times no clear trigger.  I stopped his PRN propranolol and started scheduled Toprol XL 12.5 mg daily. I told him that if he continued to have frequent episodes, then we may want to change him to Tikosyn vs ablation.    After that, he complained of some intermittent chest pain for which a stress test was performed.  There was no ischemia noted, however he developed a left bundle branch block during stress (at a heart rate of 130), chest discomfort, and there was question of inferior infarct however it was noted that this could be related to bundle pattern performed during stress.  His resting EF was 50%, stress EF 45%.  Finally, he was in A-fib during the study.  I called him and stopped his flecainide, increased metoprolol to 25 mg daily, and recommended coronary angiogram procedure.  However, on further review with Dr. Gutiérrez, he felt that the abnormalities were likely related to his rate related bundle branch block and that it was unclear whether the chest pain was related to his atrial fibrillation or ischemia.  He recommended a trial of antianginal therapy for the time being.  Due to Harshil's hesitation to take nitrate therapy as he takes Viagra, I started him on amlodipine.    Today, Harshil returns with his wife stating he feels better overall. He states both chest discomfort and dyspnea have significantly improved with the med changes. He did have some LOPEZ while in AF recently, but did some breathing exercises which helped. He's had only one episode since he saw me a little over a month ago, and that was after a BCC resection. That episode lasted two days, which has been typical for him. Fitbit HR is controlled in AF. Lowest rate has been in the upper 40's during sleep. No pattern to his chest discomfort, but again, that has also improved. Lasts maybe a few minutes when he does  "have it. Active without issue. Avid exerciser along with his wife. Patient denies orthopnea, edema, near syncope or syncope. He drinks minimal alcohol and caffeine, hydrating appropriately.      EKG today shows sinus bradycardia at 54 bpm, with a QTc of 418 ms.  10/2023 TTE showed EF 60-65%, mild-mod AI, mild-mod MI.   Labs from April show a normal BMP and CBC.         Review of Systems     12-pt ROS is negative except for as noted in the HPI.          Physical Exam     Vitals: /60   Pulse 58   Ht 1.803 m (5' 11\")   Wt 83.9 kg (185 lb)   BMI 25.80 kg/m    Wt Readings from Last 10 Encounters:   11/29/23 83.9 kg (185 lb)   10/20/23 84.8 kg (187 lb)   09/15/23 84.3 kg (185 lb 14.4 oz)   04/19/23 83.1 kg (183 lb 3.2 oz)   10/18/22 84.1 kg (185 lb 8 oz)   08/18/22 82.5 kg (181 lb 12.8 oz)   03/18/22 81.6 kg (180 lb)   08/03/21 78.9 kg (174 lb)   07/08/21 78.2 kg (172 lb 4.8 oz)   08/18/20 79.3 kg (174 lb 12.8 oz)       Constitutional:  Patient is pleasant, alert, cooperative, and in NAD.  HEENT:  NCAT. PERRLA. EOM's intact.   Neck:  CVP appears normal. No carotid bruits.   Pulmonary: Normal respiratory effort. CTAB.   Cardiac: RRR, normal S1/S2, no S3/S4, no murmur or rub.   Abdomen:  Non-tender abdomen, no hepatosplenomegaly appreciated.   Vascular: Pulses in the upper and lower extremities are 2+ and equal bilaterally.  Extremities: No edema, erythema, cyanosis or tenderness appreciated.  Skin:  No rashes or lesions appreciated.   Neurological:  No gross motor or sensory deficits.   Psych: Appropriate affect.          Data   Labs reviewed:  Recent Labs   Lab Test 06/16/23  0917 04/19/23  1002 07/08/21  1402 02/28/20  1010   LDL 82 157*  --  152*   HDL 61 62  --  62   NHDL 93 170*  --  167*   CHOL 154 232*  --  229*   TRIG 54 67  --  74   TSH  --  1.45 1.04  --        Lab Results   Component Value Date    WBC 4.5 04/19/2023    WBC 7.1 07/08/2021    RBC 4.75 04/19/2023    RBC 4.80 07/08/2021    HGB 15.5 " "04/19/2023    HGB 15.7 07/08/2021    HCT 45.6 04/19/2023    HCT 46.0 07/08/2021    MCV 96 04/19/2023    MCV 96 07/08/2021    MCH 32.6 04/19/2023    MCH 32.7 07/08/2021    MCHC 34.0 04/19/2023    MCHC 34.1 07/08/2021    RDW 12.7 04/19/2023    RDW 12.9 07/08/2021     04/19/2023     07/08/2021       Lab Results   Component Value Date     04/19/2023     07/08/2021    POTASSIUM 4.6 04/19/2023    POTASSIUM 4.0 07/08/2021    CHLORIDE 105 04/19/2023    CHLORIDE 108 07/08/2021    CO2 23 04/19/2023    CO2 22 07/08/2021    ANIONGAP 12 04/19/2023    ANIONGAP 8 07/08/2021    GLC 93 04/19/2023     (H) 07/08/2021    BUN 20.5 04/19/2023    BUN 21 07/08/2021    CR 1.01 04/19/2023    CR 0.97 07/08/2021    GFRESTIMATED 79 04/19/2023    GFRESTIMATED 78 07/08/2021    GFRESTBLACK >90 07/08/2021    STEPHEN 9.8 04/19/2023    STEPHEN 9.1 07/08/2021      Lab Results   Component Value Date    AST 32 02/08/2010    ALT 17 06/16/2023    ALT 18 02/08/2010       No results found for: \"A1C\"    No results found for: \"INR\"         Problem List     Patient Active Problem List   Diagnosis    Hyperlipidemia LDL goal <130    External hemorrhoids    Varicose veins of both lower extremities    Acute reaction to stress    Left inguinal hernia    Paroxysmal atrial fibrillation (H)            Medications     Current Outpatient Medications   Medication Sig Dispense Refill    amLODIPine (NORVASC) 2.5 MG tablet Take 1 tablet (2.5 mg) by mouth daily 90 tablet 3    apixaban ANTICOAGULANT (ELIQUIS ANTICOAGULANT) 5 MG tablet Take 1 tablet (5 mg) by mouth 2 times daily 180 tablet 3    calcium carbonate (TUMS) 500 MG chewable tablet Take 1 tablet by mouth daily. 100 tablet 3    Calcium-Magnesium-Zinc 333-133-5 MG TABS per tablet Take by mouth daily       Cholecalciferol (VITAMIN D) 1000 UNIT capsule Take 1 capsule by mouth daily. 100 capsule 12    clobetasol (TEMOVATE) 0.05 % external solution APPLY TO AFFECTED AREAS OF THE SCALP TWICE DAILY " FOR 2 WEEKS, TWICE WEEKLY THEREAFTER      clonazePAM (KLONOPIN) 0.5 MG tablet Take 1 tablet (0.5 mg) by mouth nightly as needed (REM behavior disorder) 30 tablet 3    FISH OIL 1200 MG OR CAPS Take by mouth daily       GLUCOSAMINE 500 MG OR CAPS 1500 QD  0    ketoconazole (NIZORAL) 2 % external shampoo WASH AFFECTED AREAS ON FACE,SCALP & BEARD 2 TIMES PER DAY.LATHER & LET SIT 5 MINUTES BEFORE RINSING.      metoprolol succinate ER (TOPROL XL) 25 MG 24 hr tablet Take 1 tablet (25 mg) by mouth daily      metroNIDAZOLE (METROCREAM) 0.75 % external cream APPLY A THIN LAYER TO THE ENTIRE FACE 1 - 2 TIMES DAILY      rosuvastatin (CRESTOR) 5 MG tablet Take 1 tablet (5 mg) by mouth daily 90 tablet 3    sildenafil (VIAGRA) 100 MG tablet TAKE ONE TABLET BY MOUTH DAILY AS NEEDED FOR ERECTILE DYSFUNCTION. DO NOT TAKE WITH NITRO, TERAZOSIN, OR DOXAZOSIN. 6 tablet 5            Past Medical History     Past Medical History:   Diagnosis Date    Aortic regurgitation     moderate    New onset atrial fibrillation (H)     Varicose veins of lower extremities with inflammation      Past Surgical History:   Procedure Laterality Date    APPENDECTOMY  Age 20    DAVINCI HERNIORRHAPHY INGUINAL Left 8/18/2020    Procedure: ROBOTIC ASSISTED LEFT INGUINAL HERNIA REPAIR WITH MESH;  Surgeon: Keshav Mehta MD;  Location: [facility] OR    LIGATE VEIN      Varicose veins, several times    TONSILLECTOMY & ADENOIDECTOMY  Approx age 10    [facility] LAP,HERNIA REPAIR PROC,UNLIST  2002    [facility] NONSPECIFIC PROCEDURE  2001    vein stripping     Family History   Problem Relation Age of Onset    Eye Disorder Mother         glaucoma    Cancer Sister         breast Ca, diagnosed age 49    Cardiovascular Father         Abdominal aortic aneurysm    C.A.D. No family hx of     Cancer - colorectal No family hx of     Prostate Cancer No family hx of      Social History     Socioeconomic History    Marital status:      Spouse name: Mary    Number of children: 2     Years of education: Not on file    Highest education level: Not on file   Occupational History     Employer: RETIRED     Comment: Worked as a pharmacist   Tobacco Use    Smoking status: Never    Smokeless tobacco: Never   Substance and Sexual Activity    Alcohol use: Yes     Alcohol/week: 0.0 standard drinks of alcohol     Comment: 1 drink per week    Drug use: No    Sexual activity: Yes     Partners: Female     Birth control/protection: None   Other Topics Concern     Service Not Asked    Blood Transfusions Not Asked    Caffeine Concern Not Asked    Occupational Exposure Not Asked    Hobby Hazards Not Asked    Sleep Concern Not Asked    Stress Concern Not Asked    Weight Concern Not Asked    Special Diet Not Asked    Back Care No    Exercise Yes     Comment: daily walk and ride bike    Bike Helmet Yes    Seat Belt Yes    Self-Exams Not Asked    Parent/sibling w/ CABG, MI or angioplasty before 65F 55M? Not Asked   Social History Narrative    Not on file     Social Determinants of Health     Financial Resource Strain: Low Risk  (4/19/2023)    Overall Financial Resource Strain (CARDIA)     Difficulty of Paying Living Expenses: Not hard at all   Food Insecurity: No Food Insecurity (4/19/2023)    Hunger Vital Sign     Worried About Running Out of Food in the Last Year: Never true     Ran Out of Food in the Last Year: Never true   Transportation Needs: No Transportation Needs (4/19/2023)    PRAPARE - Transportation     Lack of Transportation (Medical): No     Lack of Transportation (Non-Medical): No   Physical Activity: Sufficiently Active (4/19/2023)    Exercise Vital Sign     Days of Exercise per Week: 7 days     Minutes of Exercise per Session: 150+ min   Stress: No Stress Concern Present (4/19/2023)    [employer] [place] [employer] - Occupational Stress Questionnaire     Feeling of Stress : Only a little   Social Connections: Socially Integrated (4/19/2023)    Social Connection and Isolation Panel  [NHANES]     Frequency of Communication with Friends and Family: More than three times a week     Frequency of Social Gatherings with Friends and Family: Once a week     Attends Quaker Services: More than 4 times per year     Active Member of Clubs or Organizations: Yes     Attends Club or Organization Meetings: Not on file     Marital Status:    Interpersonal Safety: Not on file   Housing Stability: Low Risk  (4/19/2023)    Housing Stability Vital Sign     Unable to Pay for Housing in the Last Year: No     Number of Places Lived in the Last Year: 1     Unstable Housing in the Last Year: No            Allergies   No known drug allergy    Today's clinic visit entailed:  Review of the result(s) of each unique test - EKG, stress test  I spent a total of 40 minutes on the day of the visit.   Time spent by me doing chart review, history and exam, documentation and further activities per the note  Provider  Link to [facility] Help Grid     The level of medical decision making during this visit was of high complexity.      Thank you for allowing me to participate in the care of your patient.      Sincerely,     Evette Maldonado PA-C     [facility]  cc:   No referring provider defined for this encounter.

## 2024-01-10 ENCOUNTER — MYC REFILL (OUTPATIENT)
Dept: PEDIATRICS | Facility: CLINIC | Age: 74
End: 2024-01-10
Payer: COMMERCIAL

## 2024-01-10 ENCOUNTER — MYC REFILL (OUTPATIENT)
Dept: CARDIOLOGY | Facility: CLINIC | Age: 74
End: 2024-01-10
Payer: COMMERCIAL

## 2024-01-10 DIAGNOSIS — I48.0 PAROXYSMAL ATRIAL FIBRILLATION (H): ICD-10-CM

## 2024-01-10 DIAGNOSIS — R07.9 CHEST PAIN, UNSPECIFIED TYPE: ICD-10-CM

## 2024-01-10 DIAGNOSIS — E78.5 HYPERLIPIDEMIA LDL GOAL <130: ICD-10-CM

## 2024-01-10 RX ORDER — AMLODIPINE BESYLATE 2.5 MG/1
2.5 TABLET ORAL DAILY
Qty: 90 TABLET | Refills: 3 | Status: SHIPPED | OUTPATIENT
Start: 2024-01-10

## 2024-01-10 RX ORDER — ROSUVASTATIN CALCIUM 5 MG/1
5 TABLET, COATED ORAL DAILY
Qty: 90 TABLET | Refills: 0 | Status: SHIPPED | OUTPATIENT
Start: 2024-01-10 | End: 2024-04-04

## 2024-01-12 DIAGNOSIS — I48.0 PAROXYSMAL ATRIAL FIBRILLATION (H): ICD-10-CM

## 2024-01-12 RX ORDER — METOPROLOL SUCCINATE 25 MG/1
25 TABLET, EXTENDED RELEASE ORAL DAILY
Qty: 90 TABLET | Refills: 4 | Status: ON HOLD | OUTPATIENT
Start: 2024-01-12 | End: 2024-09-13

## 2024-01-12 RX ORDER — METOPROLOL SUCCINATE 25 MG/1
25 TABLET, EXTENDED RELEASE ORAL DAILY
OUTPATIENT
Start: 2024-01-12

## 2024-02-15 ENCOUNTER — MYC MEDICAL ADVICE (OUTPATIENT)
Dept: CARDIOLOGY | Facility: CLINIC | Age: 74
End: 2024-02-15
Payer: COMMERCIAL

## 2024-03-04 ASSESSMENT — SLEEP AND FATIGUE QUESTIONNAIRES
HOW LIKELY ARE YOU TO NOD OFF OR FALL ASLEEP WHILE SITTING AND TALKING TO SOMEONE: WOULD NEVER DOZE
HOW LIKELY ARE YOU TO NOD OFF OR FALL ASLEEP WHILE SITTING INACTIVE IN A PUBLIC PLACE: WOULD NEVER DOZE
HOW LIKELY ARE YOU TO NOD OFF OR FALL ASLEEP WHEN YOU ARE A PASSENGER IN A CAR FOR AN HOUR WITHOUT A BREAK: WOULD NEVER DOZE
HOW LIKELY ARE YOU TO NOD OFF OR FALL ASLEEP WHILE SITTING QUIETLY AFTER LUNCH WITHOUT ALCOHOL: WOULD NEVER DOZE
HOW LIKELY ARE YOU TO NOD OFF OR FALL ASLEEP IN A CAR, WHILE STOPPED FOR A FEW MINUTES IN TRAFFIC: WOULD NEVER DOZE
HOW LIKELY ARE YOU TO NOD OFF OR FALL ASLEEP WHILE SITTING AND READING: WOULD NEVER DOZE
HOW LIKELY ARE YOU TO NOD OFF OR FALL ASLEEP WHILE LYING DOWN TO REST IN THE AFTERNOON WHEN CIRCUMSTANCES PERMIT: HIGH CHANCE OF DOZING
HOW LIKELY ARE YOU TO NOD OFF OR FALL ASLEEP WHILE WATCHING TV: WOULD NEVER DOZE

## 2024-03-04 NOTE — PROGRESS NOTES
Does Keith have a CPAP/Bipap?  No     Embudo Sleep Scale:  3    Harshil is a 73 year old who is being evaluated via a billable video visit.      How would you like to obtain your AVS? MyChart  If the video visit is dropped, the invitation should be resent by: Text to cell phone: [phone]  Will anyone else be joining your video visit? No              Subjective   Harshil is a 73 year old, presenting for the following health issues:  Sleep Problem (Follow up on Rem Behavioral Disorder )    HPI         Objective           Vitals:  No vitals were obtained today due to virtual visit.    Physical Exam   GENERAL: alert and no distress  EYES: Eyes grossly normal to inspection.  No discharge or erythema, or obvious scleral/conjunctival abnormalities.  RESP: No audible wheeze, cough, or visible cyanosis.    SKIN: Visible skin clear. No significant rash, abnormal pigmentation or lesions.  NEURO: Cranial nerves grossly intact.  Mentation and speech appropriate for age.  PSYCH: Appropriate affect, tone, and pace of words          Video-Visit Details    Type of service:  Video Visit   Video Start Time: 10:32 AM  Video End Time:10:50 AM    Originating Location (pt. Location): Home    Distant Location (provider location):  On-site  Platform used for Video Visit: Mojgan  Signed Electronically by: Dustin Arcos PA-C       SUBJECTIVE: Keith Kevin  73 year old  male. who presents with Rem Behavior disorder. He had been acting out dreams. He initiated melatonin after doing some research. That was effective for a time before symptoms worsened. We then started Clonazepam 0.5 mg which has been effective. He has had 2 episodes of dream enactment, but they were very mild, with him reaching out in his sleep.     Past Medical History:   Diagnosis Date    Aortic regurgitation     moderate    New onset atrial fibrillation (H)     Varicose veins of lower extremities with inflammation       Past Surgical History:   Procedure Laterality Date     APPENDECTOMY  Age 20    DAVINCI HERNIORRHAPHY INGUINAL Left 8/18/2020    Procedure: ROBOTIC ASSISTED LEFT INGUINAL HERNIA REPAIR WITH MESH;  Surgeon: Keshav Mehta MD;  Location: [facility] OR    LIGATE VEIN      Varicose veins, several times    TONSILLECTOMY & ADENOIDECTOMY  Approx age 10    [facility] LAP,HERNIA REPAIR PROC,UNLIST  2002    [facility] NONSPECIFIC PROCEDURE  2001    vein stripping   .   Current Outpatient Medications   Medication Sig Dispense Refill    amLODIPine (NORVASC) 2.5 MG tablet Take 1 tablet (2.5 mg) by mouth daily 90 tablet 3    apixaban ANTICOAGULANT (ELIQUIS ANTICOAGULANT) 5 MG tablet Take 1 tablet (5 mg) by mouth 2 times daily 180 tablet 3    calcium carbonate (TUMS) 500 MG chewable tablet Take 1 tablet by mouth daily. 100 tablet 3    Calcium-Magnesium-Zinc 333-133-5 MG TABS per tablet Take by mouth daily       Cholecalciferol (VITAMIN D) 1000 UNIT capsule Take 1 capsule by mouth daily. 100 capsule 12    clobetasol (TEMOVATE) 0.05 % external solution APPLY TO AFFECTED AREAS OF THE SCALP TWICE DAILY FOR 2 WEEKS, TWICE WEEKLY THEREAFTER      clonazePAM (KLONOPIN) 0.5 MG tablet Take 1 tablet (0.5 mg) by mouth nightly as needed (REM behavior disorder) 30 tablet 3    FISH OIL 1200 MG OR CAPS Take by mouth daily       GLUCOSAMINE 500 MG OR CAPS 1500 QD  0    ketoconazole (NIZORAL) 2 % external shampoo WASH AFFECTED AREAS ON FACE,SCALP & BEARD 2 TIMES PER DAY.LATHER & LET SIT 5 MINUTES BEFORE RINSING.      metoprolol succinate ER (TOPROL XL) 25 MG 24 hr tablet Take 1 tablet (25 mg) by mouth daily 90 tablet 4    metroNIDAZOLE (METROCREAM) 0.75 % external cream APPLY A THIN LAYER TO THE ENTIRE FACE 1 - 2 TIMES DAILY      rosuvastatin (CRESTOR) 5 MG tablet Take 1 tablet (5 mg) by mouth daily 90 tablet 0    sildenafil (VIAGRA) 100 MG tablet TAKE ONE TABLET BY MOUTH DAILY AS NEEDED FOR ERECTILE DYSFUNCTION. DO NOT TAKE WITH NITRO, TERAZOSIN, OR DOXAZOSIN. 6 tablet 5      OBJECTIVE:  There were no vitals taken  for this visit.     ASSESSMENT /PLAN:  See encounter notes and orders.    1. REM behavior disorder- very well managed with Clonazepam @ 0.5 MG. No known triggers for the 2 mild episodes of dream enactment. Will continue current medications.

## 2024-03-06 ENCOUNTER — OFFICE VISIT (OUTPATIENT)
Dept: CARDIOLOGY | Facility: CLINIC | Age: 74
End: 2024-03-06
Attending: PHYSICIAN ASSISTANT
Payer: COMMERCIAL

## 2024-03-06 ENCOUNTER — VIRTUAL VISIT (OUTPATIENT)
Dept: SLEEP MEDICINE | Facility: CLINIC | Age: 74
End: 2024-03-06
Payer: COMMERCIAL

## 2024-03-06 VITALS
SYSTOLIC BLOOD PRESSURE: 112 MMHG | BODY MASS INDEX: 25.9 KG/M2 | OXYGEN SATURATION: 100 % | HEART RATE: 74 BPM | WEIGHT: 185 LBS | HEIGHT: 71 IN | DIASTOLIC BLOOD PRESSURE: 64 MMHG

## 2024-03-06 DIAGNOSIS — I48.0 PAROXYSMAL ATRIAL FIBRILLATION (H): ICD-10-CM

## 2024-03-06 DIAGNOSIS — G47.52 RBD (REM BEHAVIORAL DISORDER): Primary | ICD-10-CM

## 2024-03-06 PROCEDURE — 93000 ELECTROCARDIOGRAM COMPLETE: CPT | Performed by: INTERNAL MEDICINE

## 2024-03-06 PROCEDURE — 99214 OFFICE O/P EST MOD 30 MIN: CPT | Performed by: INTERNAL MEDICINE

## 2024-03-06 PROCEDURE — 99213 OFFICE O/P EST LOW 20 MIN: CPT | Mod: 95 | Performed by: PHYSICIAN ASSISTANT

## 2024-03-06 RX ORDER — CLONAZEPAM 0.5 MG/1
0.5 TABLET ORAL
Qty: 30 TABLET | Refills: 3 | Status: SHIPPED | OUTPATIENT
Start: 2024-03-06 | End: 2024-07-25

## 2024-03-06 NOTE — PROGRESS NOTES
Electrophysiology Clinic Progress Note    Keith Kevin MRN# [id number]   YOB: 1950 Age: 73 year old     Primary cardiologist:          Assessment and Plan   Delightful 72 yo pt noted to be in AF during pre-colonoscopy eval in 2021. Saw my parter, Dr. Dubon at that time whom recommended Flecainide soon thereafter as pt did have AF recurrence with symptoms. He has been doing well without AF. He was noted to be in typical AFL last October as documented on ECG. Subsequent nuc stress shows rate related LBBB with ?perfusion defect. Flecainide was discontinued.  Pt is here to discussed options. Mild to moderate LAE on echo.     Discussed natural progression of AF, that is over time it is likely to recur.  He has AF about 25% of the time with ventricular rate in the 50-70 bpm. Currently he is in AF. He is able to function fully when in AF, maybe a bit tired. Discussed rate vs. Rhythm control strategy. As his AF sxs are not that severe pt opted for the former. Should sxs worsen in the future may consider the latter with amio. Cont with Eliquis chronically. See Amarilys in a year.             Review of Systems     12-pt ROS is negative except for as noted in the HPI.          Physical Exam     Vitals: There were no vitals taken for this visit.  Wt Readings from Last 10 Encounters:   11/29/23 83.9 kg (185 lb)   10/20/23 84.8 kg (187 lb)   09/15/23 84.3 kg (185 lb 14.4 oz)   04/19/23 83.1 kg (183 lb 3.2 oz)   10/18/22 84.1 kg (185 lb 8 oz)   08/18/22 82.5 kg (181 lb 12.8 oz)   03/18/22 81.6 kg (180 lb)   08/03/21 78.9 kg (174 lb)   07/08/21 78.2 kg (172 lb 4.8 oz)   08/18/20 79.3 kg (174 lb 12.8 oz)       Constitutional:  Patient is pleasant, alert, cooperative, and in NAD.  HEENT:  NCAT. PERRLA. EOM's intact.   Neck:  CVP appears normal. No carotid bruits.   Pulmonary: Normal respiratory effort. CTAB.   Cardiac: iRRR, normal S1/S2, no S3/S4, no murmur or rub.   Abdomen:  Non-tender abdomen, no hepatosplenomegaly  "appreciated.   Vascular: Pulses in the upper and lower extremities are 2+ and equal bilaterally.  Extremities: No edema, erythema, cyanosis or tenderness appreciated.  Skin:  No rashes or lesions appreciated.   Neurological:  No gross motor or sensory deficits.   Psych: Appropriate affect.          Data   Labs reviewed:  Recent Labs   Lab Test 06/16/23  0917 04/19/23  1002 07/08/21  1402 02/28/20  1010   LDL 82 157*  --  152*   HDL 61 62  --  62   NHDL 93 170*  --  167*   CHOL 154 232*  --  229*   TRIG 54 67  --  74   TSH  --  1.45 1.04  --        Lab Results   Component Value Date    WBC 4.5 04/19/2023    WBC 7.1 07/08/2021    RBC 4.75 04/19/2023    RBC 4.80 07/08/2021    HGB 15.5 04/19/2023    HGB 15.7 07/08/2021    HCT 45.6 04/19/2023    HCT 46.0 07/08/2021    MCV 96 04/19/2023    MCV 96 07/08/2021    MCH 32.6 04/19/2023    MCH 32.7 07/08/2021    MCHC 34.0 04/19/2023    MCHC 34.1 07/08/2021    RDW 12.7 04/19/2023    RDW 12.9 07/08/2021     04/19/2023     07/08/2021       Lab Results   Component Value Date     04/19/2023     07/08/2021    POTASSIUM 4.6 04/19/2023    POTASSIUM 4.0 07/08/2021    CHLORIDE 105 04/19/2023    CHLORIDE 108 07/08/2021    CO2 23 04/19/2023    CO2 22 07/08/2021    ANIONGAP 12 04/19/2023    ANIONGAP 8 07/08/2021    GLC 93 04/19/2023     (H) 07/08/2021    BUN 20.5 04/19/2023    BUN 21 07/08/2021    CR 1.01 04/19/2023    CR 0.97 07/08/2021    GFRESTIMATED 79 04/19/2023    GFRESTIMATED 78 07/08/2021    GFRESTBLACK >90 07/08/2021    STEPHEN 9.8 04/19/2023    STEPHEN 9.1 07/08/2021      Lab Results   Component Value Date    AST 32 02/08/2010    ALT 17 06/16/2023    ALT 18 02/08/2010       No results found for: \"A1C\"    No results found for: \"INR\"         Problem List     Patient Active Problem List   Diagnosis    Hyperlipidemia LDL goal <130    External hemorrhoids    Varicose veins of both lower extremities    Acute reaction to stress    Left inguinal hernia    " Paroxysmal atrial fibrillation (H)            Medications     Current Outpatient Medications   Medication Sig Dispense Refill    amLODIPine (NORVASC) 2.5 MG tablet Take 1 tablet (2.5 mg) by mouth daily 90 tablet 3    apixaban ANTICOAGULANT (ELIQUIS ANTICOAGULANT) 5 MG tablet Take 1 tablet (5 mg) by mouth 2 times daily 180 tablet 3    calcium carbonate (TUMS) 500 MG chewable tablet Take 1 tablet by mouth daily. 100 tablet 3    Calcium-Magnesium-Zinc 333-133-5 MG TABS per tablet Take by mouth daily       Cholecalciferol (VITAMIN D) 1000 UNIT capsule Take 1 capsule by mouth daily. 100 capsule 12    clobetasol (TEMOVATE) 0.05 % external solution APPLY TO AFFECTED AREAS OF THE SCALP TWICE DAILY FOR 2 WEEKS, TWICE WEEKLY THEREAFTER      clonazePAM (KLONOPIN) 0.5 MG tablet Take 1 tablet (0.5 mg) by mouth nightly as needed (REM behavior disorder) 30 tablet 3    FISH OIL 1200 MG OR CAPS Take by mouth daily       GLUCOSAMINE 500 MG OR CAPS 1500 QD  0    ketoconazole (NIZORAL) 2 % external shampoo WASH AFFECTED AREAS ON FACE,SCALP & BEARD 2 TIMES PER DAY.LATHER & LET SIT 5 MINUTES BEFORE RINSING.      metoprolol succinate ER (TOPROL XL) 25 MG 24 hr tablet Take 1 tablet (25 mg) by mouth daily 90 tablet 4    metroNIDAZOLE (METROCREAM) 0.75 % external cream APPLY A THIN LAYER TO THE ENTIRE FACE 1 - 2 TIMES DAILY      rosuvastatin (CRESTOR) 5 MG tablet Take 1 tablet (5 mg) by mouth daily 90 tablet 0    sildenafil (VIAGRA) 100 MG tablet TAKE ONE TABLET BY MOUTH DAILY AS NEEDED FOR ERECTILE DYSFUNCTION. DO NOT TAKE WITH NITRO, TERAZOSIN, OR DOXAZOSIN. 6 tablet 5            Past Medical History     Past Medical History:   Diagnosis Date    Aortic regurgitation     moderate    New onset atrial fibrillation (H)     Varicose veins of lower extremities with inflammation      Past Surgical History:   Procedure Laterality Date    APPENDECTOMY  Age 20    DAVINCI HERNIORRHAPHY INGUINAL Left 8/18/2020    Procedure: ROBOTIC ASSISTED LEFT  INGUINAL HERNIA REPAIR WITH MESH;  Surgeon: Keshav Mehta MD;  Location: [facility] OR    LIGATE VEIN      Varicose veins, several times    TONSILLECTOMY & ADENOIDECTOMY  Approx age 10    [facility] LAP,HERNIA REPAIR PROC,UNLIST  2002    [facility] NONSPECIFIC PROCEDURE  2001    vein stripping     Family History   Problem Relation Age of Onset    Eye Disorder Mother         glaucoma    Cancer Sister         breast Ca, diagnosed age 49    Cardiovascular Father         Abdominal aortic aneurysm    C.A.D. No family hx of     Cancer - colorectal No family hx of     Prostate Cancer No family hx of      Social History     Socioeconomic History    Marital status:      Spouse name: Mary    Number of children: 2    Years of education: Not on file    Highest education level: Not on file   Occupational History     Employer: RETIRED     Comment: Worked as a pharmacist   Tobacco Use    Smoking status: Never    Smokeless tobacco: Never   Substance and Sexual Activity    Alcohol use: Yes     Alcohol/week: 0.0 standard drinks of alcohol     Comment: 1 drink per week    Drug use: No    Sexual activity: Yes     Partners: Female     Birth control/protection: None   Other Topics Concern     Service Not Asked    Blood Transfusions Not Asked    Caffeine Concern Not Asked    Occupational Exposure Not Asked    Hobby Hazards Not Asked    Sleep Concern Not Asked    Stress Concern Not Asked    Weight Concern Not Asked    Special Diet Not Asked    Back Care No    Exercise Yes     Comment: daily walk and ride bike    Bike Helmet Yes    Seat Belt Yes    Self-Exams Not Asked    Parent/sibling w/ CABG, MI or angioplasty before 65F 55M? Not Asked   Social History Narrative    Not on file     Social Determinants of Health     Financial Resource Strain: Low Risk  (4/19/2023)    Overall Financial Resource Strain (CARDIA)     Difficulty of Paying Living Expenses: Not hard at all   Food Insecurity: No Food Insecurity (4/19/2023)    Hunger Vital Sign      Worried About Running Out of Food in the Last Year: Never true     Ran Out of Food in the Last Year: Never true   Transportation Needs: No Transportation Needs (4/19/2023)    PRAPARE - Transportation     Lack of Transportation (Medical): No     Lack of Transportation (Non-Medical): No   Physical Activity: Sufficiently Active (4/19/2023)    Exercise Vital Sign     Days of Exercise per Week: 7 days     Minutes of Exercise per Session: 150+ min   Stress: No Stress Concern Present (4/19/2023)    [employer] - Occupational Stress Questionnaire     Feeling of Stress : Only a little   Social Connections: Socially Integrated (4/19/2023)    Social Connection and Isolation Panel [NHANES]     Frequency of Communication with Friends and Family: More than three times a week     Frequency of Social Gatherings with Friends and Family: Once a week     Attends Gnosticist Services: More than 4 times per year     Active Member of Clubs or Organizations: Yes     Attends Club or Organization Meetings: Not on file     Marital Status:    Interpersonal Safety: Not on file   Housing Stability: Low Risk  (4/19/2023)    Housing Stability Vital Sign     Unable to Pay for Housing in the Last Year: No     Number of Places Lived in the Last Year: 1     Unstable Housing in the Last Year: No            Allergies   No known drug allergy    Today's clinic visit entailed:  The following tests were independently interpreted by me as noted in my documentation: ecg, echo  30 minutes spent by me on the date of the encounter doing chart review, history and exam, documentation and further activities per the note  Provider  Link to [facility] Help Grid     The level of medical decision making during this visit was of moderate complexity.

## 2024-03-06 NOTE — LETTER
3/6/2024    Abram Villanueva MD  [address] [place] [address]    RE: Keith Kevin       Dear Colleague,     I had the pleasure of seeing Keith Kevin in the [facility].    Electrophysiology Clinic Progress Note    Keith Kevni MRN# [id number]   YOB: 1950 Age: 73 year old     Primary cardiologist:          Assessment and Plan   Delightful 72 yo pt noted to be in AF during pre-colonoscopy eval in 2021. Saw my parter, Dr. Dubon at that time whom recommended Flecainide soon thereafter as pt did have AF recurrence with symptoms. He has been doing well without AF. He was noted to be in typical AFL last October as documented on ECG. Subsequent nuc stress shows rate related LBBB with ?perfusion defect. Flecainide was discontinued.  Pt is here to discussed options. Mild to moderate LAE on echo.     Discussed natural progression of AF, that is over time it is likely to recur.  He has AF about 25% of the time with ventricular rate in the 50-70 bpm. Currently he is in AF. He is able to function fully when in AF, maybe a bit tired. Discussed rate vs. Rhythm control strategy. As his AF sxs are not that severe pt opted for the former. Should sxs worsen in the future may consider the latter with amio. Cont with Eliquis chronically. See Amarilys in a year.             Review of Systems     12-pt ROS is negative except for as noted in the HPI.          Physical Exam     Vitals: There were no vitals taken for this visit.  Wt Readings from Last 10 Encounters:   11/29/23 83.9 kg (185 lb)   10/20/23 84.8 kg (187 lb)   09/15/23 84.3 kg (185 lb 14.4 oz)   04/19/23 83.1 kg (183 lb 3.2 oz)   10/18/22 84.1 kg (185 lb 8 oz)   08/18/22 82.5 kg (181 lb 12.8 oz)   03/18/22 81.6 kg (180 lb)   08/03/21 78.9 kg (174 lb)   07/08/21 78.2 kg (172 lb 4.8 oz)   08/18/20 79.3 kg (174 lb 12.8 oz)       Constitutional:  Patient is pleasant, alert, cooperative, and in NAD.  HEENT:  NCAT. PERRLA. EOM's  "intact.   Neck:  CVP appears normal. No carotid bruits.   Pulmonary: Normal respiratory effort. CTAB.   Cardiac: iRRR, normal S1/S2, no S3/S4, no murmur or rub.   Abdomen:  Non-tender abdomen, no hepatosplenomegaly appreciated.   Vascular: Pulses in the upper and lower extremities are 2+ and equal bilaterally.  Extremities: No edema, erythema, cyanosis or tenderness appreciated.  Skin:  No rashes or lesions appreciated.   Neurological:  No gross motor or sensory deficits.   Psych: Appropriate affect.          Data   Labs reviewed:  Recent Labs   Lab Test 06/16/23  0917 04/19/23  1002 07/08/21  1402 02/28/20  1010   LDL 82 157*  --  152*   HDL 61 62  --  62   NHDL 93 170*  --  167*   CHOL 154 232*  --  229*   TRIG 54 67  --  74   TSH  --  1.45 1.04  --        Lab Results   Component Value Date    WBC 4.5 04/19/2023    WBC 7.1 07/08/2021    RBC 4.75 04/19/2023    RBC 4.80 07/08/2021    HGB 15.5 04/19/2023    HGB 15.7 07/08/2021    HCT 45.6 04/19/2023    HCT 46.0 07/08/2021    MCV 96 04/19/2023    MCV 96 07/08/2021    MCH 32.6 04/19/2023    MCH 32.7 07/08/2021    MCHC 34.0 04/19/2023    MCHC 34.1 07/08/2021    RDW 12.7 04/19/2023    RDW 12.9 07/08/2021     04/19/2023     07/08/2021       Lab Results   Component Value Date     04/19/2023     07/08/2021    POTASSIUM 4.6 04/19/2023    POTASSIUM 4.0 07/08/2021    CHLORIDE 105 04/19/2023    CHLORIDE 108 07/08/2021    CO2 23 04/19/2023    CO2 22 07/08/2021    ANIONGAP 12 04/19/2023    ANIONGAP 8 07/08/2021    GLC 93 04/19/2023     (H) 07/08/2021    BUN 20.5 04/19/2023    BUN 21 07/08/2021    CR 1.01 04/19/2023    CR 0.97 07/08/2021    GFRESTIMATED 79 04/19/2023    GFRESTIMATED 78 07/08/2021    GFRESTBLACK >90 07/08/2021    STEPHEN 9.8 04/19/2023    STEPHEN 9.1 07/08/2021      Lab Results   Component Value Date    AST 32 02/08/2010    ALT 17 06/16/2023    ALT 18 02/08/2010       No results found for: \"A1C\"    No results found for: \"INR\"         " Problem List     Patient Active Problem List   Diagnosis    Hyperlipidemia LDL goal <130    External hemorrhoids    Varicose veins of both lower extremities    Acute reaction to stress    Left inguinal hernia    Paroxysmal atrial fibrillation (H)            Medications     Current Outpatient Medications   Medication Sig Dispense Refill    amLODIPine (NORVASC) 2.5 MG tablet Take 1 tablet (2.5 mg) by mouth daily 90 tablet 3    apixaban ANTICOAGULANT (ELIQUIS ANTICOAGULANT) 5 MG tablet Take 1 tablet (5 mg) by mouth 2 times daily 180 tablet 3    calcium carbonate (TUMS) 500 MG chewable tablet Take 1 tablet by mouth daily. 100 tablet 3    Calcium-Magnesium-Zinc 333-133-5 MG TABS per tablet Take by mouth daily       Cholecalciferol (VITAMIN D) 1000 UNIT capsule Take 1 capsule by mouth daily. 100 capsule 12    clobetasol (TEMOVATE) 0.05 % external solution APPLY TO AFFECTED AREAS OF THE SCALP TWICE DAILY FOR 2 WEEKS, TWICE WEEKLY THEREAFTER      clonazePAM (KLONOPIN) 0.5 MG tablet Take 1 tablet (0.5 mg) by mouth nightly as needed (REM behavior disorder) 30 tablet 3    FISH OIL 1200 MG OR CAPS Take by mouth daily       GLUCOSAMINE 500 MG OR CAPS 1500 QD  0    ketoconazole (NIZORAL) 2 % external shampoo WASH AFFECTED AREAS ON FACE,SCALP & BEARD 2 TIMES PER DAY.LATHER & LET SIT 5 MINUTES BEFORE RINSING.      metoprolol succinate ER (TOPROL XL) 25 MG 24 hr tablet Take 1 tablet (25 mg) by mouth daily 90 tablet 4    metroNIDAZOLE (METROCREAM) 0.75 % external cream APPLY A THIN LAYER TO THE ENTIRE FACE 1 - 2 TIMES DAILY      rosuvastatin (CRESTOR) 5 MG tablet Take 1 tablet (5 mg) by mouth daily 90 tablet 0    sildenafil (VIAGRA) 100 MG tablet TAKE ONE TABLET BY MOUTH DAILY AS NEEDED FOR ERECTILE DYSFUNCTION. DO NOT TAKE WITH NITRO, TERAZOSIN, OR DOXAZOSIN. 6 tablet 5            Past Medical History     Past Medical History:   Diagnosis Date    Aortic regurgitation     moderate    New onset atrial fibrillation (H)     Varicose  veins of lower extremities with inflammation      Past Surgical History:   Procedure Laterality Date    APPENDECTOMY  Age 20    DAVINCI HERNIORRHAPHY INGUINAL Left 8/18/2020    Procedure: ROBOTIC ASSISTED LEFT INGUINAL HERNIA REPAIR WITH MESH;  Surgeon: Keshav Mehta MD;  Location: [facility] OR    LIGATE VEIN      Varicose veins, several times    TONSILLECTOMY & ADENOIDECTOMY  Approx age 10    [facility] LAP,HERNIA REPAIR PROC,UNLIST  2002    [facility] NONSPECIFIC PROCEDURE  2001    vein stripping     Family History   Problem Relation Age of Onset    Eye Disorder Mother         glaucoma    Cancer Sister         breast Ca, diagnosed age 49    Cardiovascular Father         Abdominal aortic aneurysm    C.A.D. No family hx of     Cancer - colorectal No family hx of     Prostate Cancer No family hx of      Social History     Socioeconomic History    Marital status:      Spouse name: Mary    Number of children: 2    Years of education: Not on file    Highest education level: Not on file   Occupational History     Employer: RETIRED     Comment: Worked as a pharmacist   Tobacco Use    Smoking status: Never    Smokeless tobacco: Never   Substance and Sexual Activity    Alcohol use: Yes     Alcohol/week: 0.0 standard drinks of alcohol     Comment: 1 drink per week    Drug use: No    Sexual activity: Yes     Partners: Female     Birth control/protection: None   Other Topics Concern     Service Not Asked    Blood Transfusions Not Asked    Caffeine Concern Not Asked    Occupational Exposure Not Asked    Hobby Hazards Not Asked    Sleep Concern Not Asked    Stress Concern Not Asked    Weight Concern Not Asked    Special Diet Not Asked    Back Care No    Exercise Yes     Comment: daily walk and ride bike    Bike Helmet Yes    Seat Belt Yes    Self-Exams Not Asked    Parent/sibling w/ CABG, MI or angioplasty before 65F 55M? Not Asked   Social History Narrative    Not on file     Social Determinants of Health     Financial  Resource Strain: Low Risk  (4/19/2023)    Overall Financial Resource Strain (CARDIA)     Difficulty of Paying Living Expenses: Not hard at all   Food Insecurity: No Food Insecurity (4/19/2023)    Hunger Vital Sign     Worried About Running Out of Food in the Last Year: Never true     Ran Out of Food in the Last Year: Never true   Transportation Needs: No Transportation Needs (4/19/2023)    PRAPARE - Transportation     Lack of Transportation (Medical): No     Lack of Transportation (Non-Medical): No   Physical Activity: Sufficiently Active (4/19/2023)    Exercise Vital Sign     Days of Exercise per Week: 7 days     Minutes of Exercise per Session: 150+ min   Stress: No Stress Concern Present (4/19/2023)    [employer] - Occupational Stress Questionnaire     Feeling of Stress : Only a little   Social Connections: Socially Integrated (4/19/2023)    Social Connection and Isolation Panel [NHANES]     Frequency of Communication with Friends and Family: More than three times a week     Frequency of Social Gatherings with Friends and Family: Once a week     Attends Cheondoism Services: More than 4 times per year     Active Member of Clubs or Organizations: Yes     Attends Club or Organization Meetings: Not on file     Marital Status:    Interpersonal Safety: Not on file   Housing Stability: Low Risk  (4/19/2023)    Housing Stability Vital Sign     Unable to Pay for Housing in the Last Year: No     Number of Places Lived in the Last Year: 1     Unstable Housing in the Last Year: No            Allergies   No known drug allergy    Today's clinic visit entailed:  The following tests were independently interpreted by me as noted in my documentation: ecg, echo  30 minutes spent by me on the date of the encounter doing chart review, history and exam, documentation and further activities per the note  Provider  Link to [facility] Help Grid     The level of medical decision making during this visit was  of moderate complexity.       Thank you for allowing me to participate in the care of your patient.      Sincerely,     Fred Rowe MD     [facility]  cc:   Evette Maldonado PA-C  [address]  [place] [address]

## 2024-03-11 DIAGNOSIS — N52.9 ERECTILE DYSFUNCTION, UNSPECIFIED ERECTILE DYSFUNCTION TYPE: ICD-10-CM

## 2024-03-11 RX ORDER — SILDENAFIL 100 MG/1
TABLET, FILM COATED ORAL
Qty: 6 TABLET | Refills: 0 | Status: SHIPPED | OUTPATIENT
Start: 2024-03-11 | End: 2024-04-04

## 2024-04-03 DIAGNOSIS — N52.9 ERECTILE DYSFUNCTION, UNSPECIFIED ERECTILE DYSFUNCTION TYPE: ICD-10-CM

## 2024-04-03 DIAGNOSIS — E78.5 HYPERLIPIDEMIA LDL GOAL <130: ICD-10-CM

## 2024-04-04 RX ORDER — ROSUVASTATIN CALCIUM 5 MG/1
5 TABLET, COATED ORAL DAILY
Qty: 90 TABLET | Refills: 0 | Status: SHIPPED | OUTPATIENT
Start: 2024-04-04 | End: 2024-04-22

## 2024-04-04 RX ORDER — SILDENAFIL 100 MG/1
TABLET, FILM COATED ORAL
Qty: 6 TABLET | Refills: 0 | Status: SHIPPED | OUTPATIENT
Start: 2024-04-04 | End: 2024-04-16

## 2024-04-16 DIAGNOSIS — N52.9 ERECTILE DYSFUNCTION, UNSPECIFIED ERECTILE DYSFUNCTION TYPE: ICD-10-CM

## 2024-04-16 RX ORDER — SILDENAFIL 100 MG/1
TABLET, FILM COATED ORAL
Qty: 30 TABLET | Refills: 0 | Status: SHIPPED | OUTPATIENT
Start: 2024-04-16 | End: 2024-09-20

## 2024-04-19 SDOH — HEALTH STABILITY: PHYSICAL HEALTH: ON AVERAGE, HOW MANY MINUTES DO YOU ENGAGE IN EXERCISE AT THIS LEVEL?: 130 MIN

## 2024-04-19 SDOH — HEALTH STABILITY: PHYSICAL HEALTH: ON AVERAGE, HOW MANY DAYS PER WEEK DO YOU ENGAGE IN MODERATE TO STRENUOUS EXERCISE (LIKE A BRISK WALK)?: 7 DAYS

## 2024-04-19 ASSESSMENT — SOCIAL DETERMINANTS OF HEALTH (SDOH): HOW OFTEN DO YOU GET TOGETHER WITH FRIENDS OR RELATIVES?: MORE THAN THREE TIMES A WEEK

## 2024-04-22 ENCOUNTER — OFFICE VISIT (OUTPATIENT)
Dept: PEDIATRICS | Facility: CLINIC | Age: 74
End: 2024-04-22
Payer: COMMERCIAL

## 2024-04-22 VITALS
WEIGHT: 182.7 LBS | HEIGHT: 70 IN | RESPIRATION RATE: 20 BRPM | DIASTOLIC BLOOD PRESSURE: 60 MMHG | HEART RATE: 63 BPM | OXYGEN SATURATION: 98 % | BODY MASS INDEX: 26.16 KG/M2 | SYSTOLIC BLOOD PRESSURE: 120 MMHG | TEMPERATURE: 97.4 F

## 2024-04-22 DIAGNOSIS — Z00.00 ROUTINE GENERAL MEDICAL EXAMINATION AT A HEALTH CARE FACILITY: Primary | ICD-10-CM

## 2024-04-22 DIAGNOSIS — E78.5 HYPERLIPIDEMIA LDL GOAL <130: ICD-10-CM

## 2024-04-22 DIAGNOSIS — N52.9 ERECTILE DYSFUNCTION, UNSPECIFIED ERECTILE DYSFUNCTION TYPE: ICD-10-CM

## 2024-04-22 LAB
ALBUMIN SERPL BCG-MCNC: 4.6 G/DL (ref 3.5–5.2)
ALP SERPL-CCNC: 61 U/L (ref 40–150)
ALT SERPL W P-5'-P-CCNC: 18 U/L (ref 0–70)
ANION GAP SERPL CALCULATED.3IONS-SCNC: 9 MMOL/L (ref 7–15)
AST SERPL W P-5'-P-CCNC: 22 U/L (ref 0–45)
BILIRUB SERPL-MCNC: 1 MG/DL
BUN SERPL-MCNC: 19 MG/DL (ref 8–23)
CALCIUM SERPL-MCNC: 9.4 MG/DL (ref 8.8–10.2)
CHLORIDE SERPL-SCNC: 105 MMOL/L (ref 98–107)
CHOLEST SERPL-MCNC: 158 MG/DL
CREAT SERPL-MCNC: 0.87 MG/DL (ref 0.67–1.17)
DEPRECATED HCO3 PLAS-SCNC: 26 MMOL/L (ref 22–29)
EGFRCR SERPLBLD CKD-EPI 2021: >90 ML/MIN/1.73M2
FASTING STATUS PATIENT QL REPORTED: YES
GLUCOSE SERPL-MCNC: 95 MG/DL (ref 70–99)
HDLC SERPL-MCNC: 62 MG/DL
LDLC SERPL CALC-MCNC: 83 MG/DL
NONHDLC SERPL-MCNC: 96 MG/DL
POTASSIUM SERPL-SCNC: 3.9 MMOL/L (ref 3.4–5.3)
PROT SERPL-MCNC: 7 G/DL (ref 6.4–8.3)
SODIUM SERPL-SCNC: 140 MMOL/L (ref 135–145)
TRIGL SERPL-MCNC: 67 MG/DL

## 2024-04-22 PROCEDURE — 80061 LIPID PANEL: CPT | Performed by: INTERNAL MEDICINE

## 2024-04-22 PROCEDURE — 36415 COLL VENOUS BLD VENIPUNCTURE: CPT | Performed by: INTERNAL MEDICINE

## 2024-04-22 PROCEDURE — 80053 COMPREHEN METABOLIC PANEL: CPT | Performed by: INTERNAL MEDICINE

## 2024-04-22 PROCEDURE — G0439 PPPS, SUBSEQ VISIT: HCPCS | Performed by: INTERNAL MEDICINE

## 2024-04-22 RX ORDER — RESPIRATORY SYNCYTIAL VIRUS VACCINE 120MCG/0.5
0.5 KIT INTRAMUSCULAR ONCE
Qty: 1 EACH | Refills: 0 | Status: CANCELLED | OUTPATIENT
Start: 2024-04-22 | End: 2024-04-22

## 2024-04-22 RX ORDER — ROSUVASTATIN CALCIUM 5 MG/1
5 TABLET, COATED ORAL DAILY
Qty: 90 TABLET | Refills: 4 | Status: SHIPPED | OUTPATIENT
Start: 2024-04-22

## 2024-04-22 RX ORDER — TADALAFIL 20 MG/1
20 TABLET ORAL EVERY 24 HOURS
Qty: 6 TABLET | Refills: 11 | Status: SHIPPED | OUTPATIENT
Start: 2024-04-22

## 2024-04-22 ASSESSMENT — PAIN SCALES - GENERAL: PAINLEVEL: NO PAIN (0)

## 2024-04-22 NOTE — PROGRESS NOTES
Preventive Care Visit  [facility]  Abram Villanueva MD, Internal Medicine - Pediatrics  Apr 22, 2024    Assessment & Plan       ICD-10-CM    1. Routine general medical examination at a health care facility  Z00.00 Lipid panel reflex to direct LDL Fasting     Comprehensive metabolic panel     Lipid panel reflex to direct LDL Fasting     Comprehensive metabolic panel      2. Hyperlipidemia LDL goal <130  E78.5 Lipid panel reflex to direct LDL Fasting     Comprehensive metabolic panel     rosuvastatin (CRESTOR) 5 MG tablet     Lipid panel reflex to direct LDL Fasting     Comprehensive metabolic panel      3. Erectile dysfunction, unspecified erectile dysfunction type  N52.9 tadalafil (ADCIRCA/CIALIS) 20 MG tablet        Overall he is feeling well.  Fasting lab work was ordered today.  Continue with his current rosuvastatin dose.  Trial of tadalafil 20 mg daily as needed.  Call if this is not effective.      Counseling  Appropriate preventive services were discussed with this patient, including applicable screening as appropriate for fall prevention, nutrition, physical activity, Tobacco-use cessation, weight loss and cognition.  Checklist reviewing preventive services available has been given to the patient.  Reviewed patient's diet, addressing concerns and/or questions.   Patient reported safety concerns were addressed today.The patient was provided with written information regarding signs of hearing loss.   Information on urinary incontinence and treatment options given to patient.       Abram Villanueva MD     Subjective   Harshil is a 73 year old, presenting for the following:  Physical        4/22/2024     8:05 AM   Additional Questions   Roomed by S         4/22/2024     8:05 AM   Patient Reported Additional Medications   Patient reports taking the following new medications none     Health Care Directive  Patient has a Health Care Directive on file    HPI  Harshil is here for his annual wellness visit.   Overall he is feeling well.  We reviewed health maintenance as well as his health history.    He has a history of hyperlipidemia.  This has been well treated.  No issues with medications.    Erectile dysfunction.  He has been treated with sildenafil.  This has been seeming less effective.  We discussed trial of alternate medication.          4/19/2024   General Health   How would you rate your overall physical health? Good   Feel stress (tense, anxious, or unable to sleep) Not at all         4/19/2024   Nutrition   Diet: Regular (no restrictions)         4/19/2024   Exercise   Days per week of moderate/strenous exercise 7 days   Average minutes spent exercising at this level 130 min         4/19/2024   Social Factors   Frequency of gathering with friends or relatives More than three times a week   Worry food won't last until get money to buy more No   Food not last or not have enough money for food? No   Do you have housing?  Yes   Are you worried about losing your housing? No   Lack of transportation? No   Unable to get utilities (heat,electricity)? No         4/22/2024   Fall Risk   Fallen 2 or more times in the past year? No   Trouble with walking or balance? No          4/19/2024   Activities of Daily Living- Home Safety   Needs help with the following daily activites None of the above   Safety concerns in the home Throw rugs in the hallway         4/19/2024   Dental   Dentist two times every year? Yes         4/19/2024   Hearing Screening   Hearing concerns? (!) I FEEL THAT PEOPLE ARE MUMBLING OR NOT SPEAKING CLEARLY.    (!) I NEED TO ASK PEOPLE TO SPEAK UP OR REPEAT THEMSELVES.    (!) IT'S HARDER TO UNDERSTAND WOMEN'S VOICES THAN MEN'S VOICES.    (!) IT'S HARD TO FOLLOW A CONVERSATION IN A NOISY RESTAURANT OR CROWDED ROOM.    (!) TROUBLE UNDESTANDING A SPEAKER IN A PUBLIC MEETING OR [employer] SERVICE.    (!) TROUBLE FOLLOWING DIALOGUE IN THE THEATHER.    (!) TROUBLE UNDERSTANDING SOFT OR WHISPERED SPEECH.     (!) TROUBLE UNDERSTANDING SPEECH ON THE TELEPHONE         4/19/2024   Driving Risk Screening   Patient/family members have concerns about driving No         4/19/2024   General Alertness/Fatigue Screening   Have you been more tired than usual lately? No         4/19/2024   Urinary Incontinence Screening   Bothered by leaking urine in past 6 months Yes         4/19/2024   TB Screening   Were you born outside of the [place]? No         Today's PHQ-2 Score:       4/22/2024     8:04 AM   PHQ-2 ( 1999 [employer])   Q1: Little interest or pleasure in doing things 0   Q2: Feeling down, depressed or hopeless 0   PHQ-2 Score 0   Q1: Little interest or pleasure in doing things Not at all   Q2: Feeling down, depressed or hopeless Not at all   PHQ-2 Score 0           4/19/2024   Substance Use   Alcohol more than 3/day or more than 7/wk No   Do you have a current opioid prescription? No   How severe/bad is pain from 1 to 10? 0/10 (No Pain)   Do you use any other substances recreationally? No     Social History     Tobacco Use    Smoking status: Never     Passive exposure: Never    Smokeless tobacco: Never   Vaping Use    Vaping status: Never Used   Substance Use Topics    Alcohol use: Yes     Comment: 1 beer weekly    Drug use: No           4/19/2024   AAA Screening   Family history of Abdominal Aortic Aneurysm (AAA)? (!) YES    ASCVD Risk   The 10-year ASCVD risk score (Jean Paul ACEVEDO, et al., 2019) is: 18%    Values used to calculate the score:      Age: 73 years      Sex: Male      Is Non- : No      Diabetic: No      Tobacco smoker: No      Systolic Blood Pressure: 120 mmHg      Is BP treated: No      HDL Cholesterol: 61 mg/dL      Total Cholesterol: 154 mg/dL    Current providers sharing in care for this patient include:  Patient Care Team:  Abram Villanueva MD as PCP - General  Abram Villanueva MD as Assigned PCP  Dustin Nuñez PA-C as Assigned Sleep Provider  Elise Rao PA-C as Physician  "Assistant (Cardiology)  Fred Gutiérrez MD as Assigned Heart and Vascular Provider    The following health maintenance items are reviewed in Epic and correct as of today:  Health Maintenance   Topic Date Due    ANNUAL REVIEW OF HM ORDERS  Never done    RSV VACCINE (Pregnancy & 60+) (1 - 1-dose 60+ series) Never done    MEDICARE ANNUAL WELLNESS VISIT  04/19/2024    COLORECTAL CANCER SCREENING  04/11/2025    FALL RISK ASSESSMENT  04/22/2025    GLUCOSE  04/19/2026    DTAP/TDAP/TD IMMUNIZATION (4 - Td or Tdap) 02/07/2028    ADVANCE CARE PLANNING  04/25/2028    LIPID  06/16/2028    HEPATITIS C SCREENING  Completed    PHQ-2 (once per calendar year)  Completed    INFLUENZA VACCINE  Completed    Pneumococcal Vaccine: 65+ Years  Completed    ZOSTER IMMUNIZATION  Completed    COVID-19 Vaccine  Completed    IPV IMMUNIZATION  Aged Out    HPV IMMUNIZATION  Aged Out    MENINGITIS IMMUNIZATION  Aged Out    RSV MONOCLONAL ANTIBODY  Aged Out          Objective    Exam  /60 (BP Location: Right arm, Patient Position: Sitting, Cuff Size: Adult Regular)   Pulse 63   Temp 97.4  F (36.3  C) (Temporal)   Resp 20   Ht 1.774 m (5' 9.84\")   Wt 82.9 kg (182 lb 11.2 oz)   SpO2 98%   BMI 26.33 kg/m     Estimated body mass index is 26.33 kg/m  as calculated from the following:    Height as of this encounter: 1.774 m (5' 9.84\").    Weight as of this encounter: 82.9 kg (182 lb 11.2 oz).    Physical Exam  GENERAL: healthy, alert and no distress  EYES: PERRL, EOMI  HENT: ear canals and TM's normal. No nasal discharge. OP moist.  NECK: no adenopathy  RESP: lungs clear to auscultation - no rales, rhonchi or wheezes  CV: regular rate and rhythm, normal S1 S2, no murmur, no peripheral edema and peripheral pulses strong  ABDOMEN: soft, nontender, bowel sounds normal  MS: no gross musculoskeletal defects noted  SKIN: no suspicious lesions or rashes  NEURO: Normal strength and tone  PSYCH: mentation appears normal, affect normal/bright         " 4/22/2024   Mini Cog   Clock Draw Score 2 Normal    2 Normal   3 Item Recall 3 objects recalled   Mini Cog Total Score 5              Signed Electronically by: Abram Villanueva MD

## 2024-04-25 ENCOUNTER — TELEPHONE (OUTPATIENT)
Dept: PEDIATRICS | Facility: CLINIC | Age: 74
End: 2024-04-25
Payer: COMMERCIAL

## 2024-07-03 ENCOUNTER — TELEPHONE (OUTPATIENT)
Dept: CARDIOLOGY | Facility: CLINIC | Age: 74
End: 2024-07-03
Payer: COMMERCIAL

## 2024-07-03 DIAGNOSIS — I35.1 AORTIC VALVE INSUFFICIENCY, ETIOLOGY OF CARDIAC VALVE DISEASE UNSPECIFIED: Primary | ICD-10-CM

## 2024-07-03 NOTE — TELEPHONE ENCOUNTER
Health Call Center    Phone Message    May a detailed message be left on voicemail: yes     Reason for Call: Other: Patient called stating they are due for an echo and follow up. No orders for echocardiogram. Please review and place orders for echo if needed, and call patient back to further coordinate appts in [place].     Action Taken: Other: Cardiology    Travel Screening: Not Applicable     Thank you!  [facility]

## 2024-07-03 NOTE — TELEPHONE ENCOUNTER
Spoke to pt and made him aware that would need to discuss with Dr Gutiérrez who saw pt in March if and echo is needed this year. Pt is also aware March of 2025 he will see Evette Maldonado again.  Pt states understanding. Yi

## 2024-07-09 ENCOUNTER — OFFICE VISIT (OUTPATIENT)
Dept: PEDIATRICS | Facility: CLINIC | Age: 74
End: 2024-07-09
Payer: COMMERCIAL

## 2024-07-09 VITALS
OXYGEN SATURATION: 97 % | TEMPERATURE: 97.1 F | RESPIRATION RATE: 18 BRPM | HEIGHT: 70 IN | WEIGHT: 182.4 LBS | SYSTOLIC BLOOD PRESSURE: 106 MMHG | DIASTOLIC BLOOD PRESSURE: 65 MMHG | HEART RATE: 77 BPM | BODY MASS INDEX: 26.11 KG/M2

## 2024-07-09 DIAGNOSIS — Z01.818 PREOP GENERAL PHYSICAL EXAM: Primary | ICD-10-CM

## 2024-07-09 DIAGNOSIS — E78.5 HYPERLIPIDEMIA LDL GOAL <130: ICD-10-CM

## 2024-07-09 DIAGNOSIS — I48.0 PAROXYSMAL ATRIAL FIBRILLATION (H): ICD-10-CM

## 2024-07-09 DIAGNOSIS — H26.9 CATARACT OF BOTH EYES, UNSPECIFIED CATARACT TYPE: ICD-10-CM

## 2024-07-09 PROCEDURE — 99214 OFFICE O/P EST MOD 30 MIN: CPT | Performed by: INTERNAL MEDICINE

## 2024-07-09 RX ORDER — RESPIRATORY SYNCYTIAL VIRUS VACCINE 120MCG/0.5
0.5 KIT INTRAMUSCULAR ONCE
Qty: 1 EACH | Refills: 0 | Status: CANCELLED | OUTPATIENT
Start: 2024-07-09 | End: 2024-07-09

## 2024-07-09 ASSESSMENT — PAIN SCALES - GENERAL: PAINLEVEL: NO PAIN (0)

## 2024-07-09 NOTE — PROGRESS NOTES
Preoperative Evaluation  [facility]  [address]  [address]  [place] [address]  Phone: [phone]  Fax: [phone]  Primary Provider: Abram Villanueva MD    Jul 9, 2024 7/6/2024   Surgical Information   What procedure is being done? Cataract surgery   Facility or Hospital where procedure/surgery will be performed: [facility]     Who is doing the procedure / surgery? Lluvia Polanco MD   Date of surgery / procedure: 7/17/24 (Left Eye),  7/31/24 (Right Eye)        Fax number for surgical facility: [phone]    Assessment & Plan     The proposed surgical procedure is considered LOW risk.      ICD-10-CM    1. Preop general physical exam  Z01.818       2. Cataract of both eyes, unspecified cataract type  H26.9       3. Paroxysmal atrial fibrillation (H)  I48.0       4. Hyperlipidemia LDL goal <130  E78.5         No medication changes are needed for these surgeries.  Discussed taking am meds at least 2 hours prior to procedure or taking after the surgery is complete.           Recommendation  Approval given to proceed with proposed procedure, without further diagnostic evaluation.    Abram Villanueva MD      Sarahi Chua is a 73 year old, presenting for the following:  Pre-Op Exam          7/9/2024     8:42 AM   Additional Questions   Roomed by LO         7/9/2024     8:42 AM   Patient Reported Additional Medications   Patient reports taking the following new medications no     HPI related to upcoming procedure: Bilateral cataracts. Plan for surgical intervention as noted above.         7/6/2024   Pre-Op Questionnaire   Have you ever had a heart attack or stroke? No   Have you ever had surgery on your heart or blood vessels, such as a stent placement, a coronary artery bypass, or surgery on an artery in your head, neck, heart, or legs? No   Do you have chest pain with activity? No   Do you have a history of heart failure? No   Do you currently have a cold,  bronchitis or symptoms of other infection? No   Do you have a cough, shortness of breath, or wheezing? No   Do you or anyone in your family have previous history of blood clots? No   Do you or does anyone in your family have a serious bleeding problem such as prolonged bleeding following surgeries or cuts? No   Have you ever had problems with anemia or been told to take iron pills? No   Have you had any abnormal blood loss such as black, tarry or bloody stools? No   Have you ever had a blood transfusion? No   Are you willing to have a blood transfusion if it is medically needed before, during, or after your surgery? Yes   Have you or any of your relatives ever had problems with anesthesia? No   Do you have sleep apnea, excessive snoring or daytime drowsiness? No   Do you have any artifical heart valves or other implanted medical devices like a pacemaker, defibrillator, or continuous glucose monitor? No   Do you have artificial joints? No   Are you allergic to latex? No      Has paroxysmal atrial fibrillation. Currently feels that he is in a-fib. Rate is controlled. No other cardiac sx such as CP,  PND, orthopnea, LOPEZ or peripheral edema. He has been in contact with his cardiologist; they are considering cardioversion later this week if he remains in a-fib.     Hyperlipidemia. Treated with rosuvastatin. No med side effects noted.       Patient Active Problem List   Diagnosis    Hyperlipidemia LDL goal <130    External hemorrhoids    Varicose veins of both lower extremities    Acute reaction to stress    Left inguinal hernia    Paroxysmal atrial fibrillation (H)         Past Medical History:   Diagnosis Date    Aortic regurgitation     moderate    New onset atrial fibrillation (H)     Varicose veins of lower extremities with inflammation      Past Surgical History:   Procedure Laterality Date    ABDOMEN SURGERY  7/27/2020    APPENDECTOMY  Age 20    DAVINCI HERNIORRHAPHY INGUINAL Left 08/18/2020    Procedure: ROBOTIC  ASSISTED LEFT INGUINAL HERNIA REPAIR WITH MESH;  Surgeon: Keshav Mehta MD;  Location: [facility] OR    LIGATE VEIN      Varicose veins, several times    TONSILLECTOMY & ADENOIDECTOMY  Approx age 10    [facility] LAP,HERNIA REPAIR PROC,UNLIST  2002    [facility] NONSPECIFIC PROCEDURE  2001    vein stripping     Current Outpatient Medications   Medication Sig Dispense Refill    amLODIPine (NORVASC) 2.5 MG tablet Take 1 tablet (2.5 mg) by mouth daily 90 tablet 3    apixaban ANTICOAGULANT (ELIQUIS ANTICOAGULANT) 5 MG tablet Take 1 tablet (5 mg) by mouth 2 times daily 180 tablet 3    calcium carbonate (TUMS) 500 MG chewable tablet Take 1 tablet by mouth daily. 100 tablet 3    Calcium-Magnesium-Zinc 333-133-5 MG TABS per tablet Take by mouth daily       Cholecalciferol (VITAMIN D) 1000 UNIT capsule Take 1 capsule by mouth daily. 100 capsule 12    clobetasol (TEMOVATE) 0.05 % external solution APPLY TO AFFECTED AREAS OF THE SCALP TWICE DAILY FOR 2 WEEKS, TWICE WEEKLY THEREAFTER      clonazePAM (KLONOPIN) 0.5 MG tablet Take 1 tablet (0.5 mg) by mouth nightly as needed (REM behavior disorder) 30 tablet 3    FISH OIL 1200 MG OR CAPS Take by mouth daily       GLUCOSAMINE 500 MG OR CAPS 1500 QD  0    ketoconazole (NIZORAL) 2 % external shampoo WASH AFFECTED AREAS ON FACE,SCALP & BEARD 2 TIMES PER DAY.LATHER & LET SIT 5 MINUTES BEFORE RINSING.      metoprolol succinate ER (TOPROL XL) 25 MG 24 hr tablet Take 1 tablet (25 mg) by mouth daily 90 tablet 4    metroNIDAZOLE (METROCREAM) 0.75 % external cream APPLY A THIN LAYER TO THE ENTIRE FACE 1 - 2 TIMES DAILY      rosuvastatin (CRESTOR) 5 MG tablet Take 1 tablet (5 mg) by mouth daily 90 tablet 4    sildenafil (VIAGRA) 100 MG tablet TAKE ONE TABLET BY MOUTH DAILY AS NEEDED FOR ERECTILE DYSFUNCTION. DO NOT TAKE WITH NITRO, TERAZOSIN OR DOXAZOSIN 30 tablet 0    tadalafil (ADCIRCA/CIALIS) 20 MG tablet Take 1 tablet (20 mg) by mouth every 24 hours 6 tablet 11       Allergies   Allergen Reactions    No  "Known Drug Allergy         Social History     Tobacco Use    Smoking status: Never     Passive exposure: Never    Smokeless tobacco: Never   Substance Use Topics    Alcohol use: Yes     Comment: 1 beer weekly       History   Drug Use No             Objective    /65   Pulse 77   Temp 97.1  F (36.2  C) (Temporal)   Resp 18   Ht 1.783 m (5' 10.2\")   Wt 82.7 kg (182 lb 6.4 oz)   SpO2 97%   BMI 26.02 kg/m     Estimated body mass index is 26.02 kg/m  as calculated from the following:    Height as of this encounter: 1.783 m (5' 10.2\").    Weight as of this encounter: 82.7 kg (182 lb 6.4 oz).  Physical Exam  GENERAL: healthy, alert and no distress  EYES: PERRL, EOMI  HENT: ear canals and TM's normal. No nasal discharge. OP moist.  NECK: no adenopathy  RESP: lungs clear to auscultation - no rales, rhonchi or wheezes  CV: regular rate and irregular rhythm, normal S1 S2, no murmur  ABDOMEN: soft, nontender, bowel sounds normal  MS: no gross musculoskeletal defects noted  SKIN: no suspicious lesions or rashes  NEURO: Normal strength and tone  PSYCH: mentation appears normal, affect normal      Recent Labs   Lab Test 04/22/24  0844      POTASSIUM 3.9   CR 0.87        Diagnostics  No labs were ordered during this visit.     No EKG required for low risk surgery (cataract, skin procedure, breast biopsy, etc).         Signed Electronically by: Abram Villanueva MD  "

## 2024-07-23 ENCOUNTER — TELEPHONE (OUTPATIENT)
Dept: CARDIOLOGY | Facility: CLINIC | Age: 74
End: 2024-07-23

## 2024-07-23 NOTE — TELEPHONE ENCOUNTER
LM for pt to that EKG scheduled tomorrow has been canceled since this is not needed, due pt converting to SR. Asked that pt call back to confirm. CathrynRFRANCIA

## 2024-07-25 DIAGNOSIS — G47.52 RBD (REM BEHAVIORAL DISORDER): Primary | ICD-10-CM

## 2024-07-25 RX ORDER — CLONAZEPAM 0.5 MG/1
0.5 TABLET ORAL
Qty: 30 TABLET | Refills: 5 | Status: SHIPPED | OUTPATIENT
Start: 2024-07-25

## 2024-07-25 NOTE — TELEPHONE ENCOUNTER
Pt used to see Dr. Dustin Nuñez, and is planning to transfer care to Dr. Renzo Corcoran, but the soonest appointment he could get with Dr. Corcoran is in October, and he will be out of medication over the weekend.    Please review and send new rx.    Thank you,  Wilma Ambrocio, [facility]

## 2024-08-19 ENCOUNTER — MYC MEDICAL ADVICE (OUTPATIENT)
Dept: CARDIOLOGY | Facility: CLINIC | Age: 74
End: 2024-08-19
Payer: COMMERCIAL

## 2024-09-06 ENCOUNTER — TELEPHONE (OUTPATIENT)
Dept: CARDIOLOGY | Facility: CLINIC | Age: 74
End: 2024-09-06
Payer: COMMERCIAL

## 2024-09-06 DIAGNOSIS — I48.0 PAROXYSMAL ATRIAL FIBRILLATION (H): Primary | ICD-10-CM

## 2024-09-06 NOTE — TELEPHONE ENCOUNTER
Patient being admitted for tikosyn load on 9/11 will reach out to RX team to be sure medication is covered.  GENEVIEVE Ortega

## 2024-09-06 NOTE — TELEPHONE ENCOUNTER
Will reach out to Dr Gutiérrez for med holds prior to admission for tikosyn load on 9/11.  GENEVIEVE Ortega

## 2024-09-06 NOTE — TELEPHONE ENCOUNTER
Patient has Medicare Advantage through [email or web address].    Dofetilide: $79/mo.    Dofetilide is Tier 4.  It is eligible for a tier exception since the lower tier agents aren't appropriate for him.  I will submit that and let you know what happens, and if the price has changed.  I likely won't hear until Monday afternoon.    Samara Pitts  Pharmacy Technician/Liaison, Discharge Pharmacy   [phone] (voice or text)  [email or web address]  Available on [employer] and Teams

## 2024-09-09 NOTE — TELEPHONE ENCOUNTER
Spoke to patient to review medication hold for tikosyn load.  He takes his metoprolol in the evening.  He was instructed to hold it tomorrow evening.  Also discussed cost of medication per RX team.  Awaiting response to see if tier exception will be completed to lower cost of medication.  Patient provided verbal understanding regarding above.  Patient also informed that he will get a call tomorrow to review any additional instructions for tikosyn load on 9/11.  GENEVIEVE Ortega

## 2024-09-10 DIAGNOSIS — I48.0 PAROXYSMAL ATRIAL FIBRILLATION (H): Primary | ICD-10-CM

## 2024-09-10 RX ORDER — LIDOCAINE 40 MG/G
CREAM TOPICAL
Status: CANCELLED | OUTPATIENT
Start: 2024-09-10

## 2024-09-10 RX ORDER — NITROGLYCERIN 0.4 MG/1
0.4 TABLET SUBLINGUAL EVERY 5 MIN PRN
Status: CANCELLED | OUTPATIENT
Start: 2024-09-10

## 2024-09-10 RX ORDER — MAGNESIUM HYDROXIDE/ALUMINUM HYDROXICE/SIMETHICONE 120; 1200; 1200 MG/30ML; MG/30ML; MG/30ML
30 SUSPENSION ORAL EVERY 4 HOURS PRN
Status: CANCELLED | OUTPATIENT
Start: 2024-09-10

## 2024-09-10 RX ORDER — ACETAMINOPHEN 650 MG/1
650 SUPPOSITORY RECTAL EVERY 4 HOURS PRN
Status: CANCELLED | OUTPATIENT
Start: 2024-09-10

## 2024-09-10 RX ORDER — ACETAMINOPHEN 325 MG/1
650 TABLET ORAL EVERY 4 HOURS PRN
Status: CANCELLED | OUTPATIENT
Start: 2024-09-10

## 2024-09-10 NOTE — TELEPHONE ENCOUNTER
I messaged Dr Gutiérrez about contraindications to the other medications, and he stated he will switch to sotalol.    Samara Pitts  Pharmacy Technician/Liaison, Discharge Pharmacy   [phone] (voice or text)  [email or web address]  Available on [employer] and Teams

## 2024-09-10 NOTE — TELEPHONE ENCOUNTER
Verbal orders per Dr Gutiérrez change dofetilide load to sotalol load per recommendation per RX team.  GENEVIEVE Ortega

## 2024-09-10 NOTE — TELEPHONE ENCOUNTER
Spoke to patient to alert him on the medication change from dofetilide to sotalol for the direct admission on 9/11.   This was a recommendation per RX team.  Cost is significantly cheaper.  Discussed arrival time of 7:30am on 9/11.  He is to hold his metoprolol today.  He has confirmed he has not missed any AC in the last 21 days in case cardioversion is needed.  Orders placed in epic.  Patient had not further questions.  GENEVIEVE Ortega

## 2024-09-10 NOTE — TELEPHONE ENCOUNTER
Sotalol 80mg, #60: $2/mo.    Samara Pitts  Pharmacy Technician/Liaison, Discharge Pharmacy   [phone] (voice or text)  [email or web address]  Available on Voc[employer] and Teams

## 2024-09-11 ENCOUNTER — HOSPITAL ENCOUNTER (INPATIENT)
Facility: CLINIC | Age: 74
LOS: 2 days | Discharge: HOME | DRG: 310 | End: 2024-09-13
Attending: INTERNAL MEDICINE | Admitting: INTERNAL MEDICINE
Payer: COMMERCIAL

## 2024-09-11 DIAGNOSIS — I48.0 PAROXYSMAL ATRIAL FIBRILLATION (H): ICD-10-CM

## 2024-09-11 LAB
ANION GAP SERPL CALCULATED.3IONS-SCNC: 11 MMOL/L (ref 7–15)
BUN SERPL-MCNC: 19.3 MG/DL (ref 8–23)
CALCIUM SERPL-MCNC: 9.3 MG/DL (ref 8.8–10.4)
CHLORIDE SERPL-SCNC: 108 MMOL/L (ref 98–107)
CREAT SERPL-MCNC: 0.95 MG/DL (ref 0.67–1.17)
EGFRCR SERPLBLD CKD-EPI 2021: 83 ML/MIN/1.73M2
GLUCOSE SERPL-MCNC: 95 MG/DL (ref 70–99)
HCO3 SERPL-SCNC: 23 MMOL/L (ref 22–29)
MAGNESIUM SERPL-MCNC: 2.3 MG/DL (ref 1.7–2.3)
POTASSIUM SERPL-SCNC: 4.3 MMOL/L (ref 3.4–5.3)
SODIUM SERPL-SCNC: 142 MMOL/L (ref 135–145)

## 2024-09-11 PROCEDURE — 80048 BASIC METABOLIC PNL TOTAL CA: CPT | Performed by: INTERNAL MEDICINE

## 2024-09-11 PROCEDURE — 99222 1ST HOSP IP/OBS MODERATE 55: CPT | Mod: FS | Performed by: NURSE PRACTITIONER

## 2024-09-11 PROCEDURE — 93005 ELECTROCARDIOGRAM TRACING: CPT

## 2024-09-11 PROCEDURE — 93010 ELECTROCARDIOGRAM REPORT: CPT | Performed by: INTERNAL MEDICINE

## 2024-09-11 PROCEDURE — 210N000002 HC R&B HEART CARE

## 2024-09-11 PROCEDURE — 250N000013 HC RX MED GY IP 250 OP 250 PS 637: Performed by: NURSE PRACTITIONER

## 2024-09-11 PROCEDURE — 83735 ASSAY OF MAGNESIUM: CPT | Performed by: INTERNAL MEDICINE

## 2024-09-11 RX ORDER — LIDOCAINE 40 MG/G
CREAM TOPICAL
Status: DISCONTINUED | OUTPATIENT
Start: 2024-09-11 | End: 2024-09-13 | Stop reason: HOSPADM

## 2024-09-11 RX ORDER — NITROGLYCERIN 0.4 MG/1
0.4 TABLET SUBLINGUAL EVERY 5 MIN PRN
Status: DISCONTINUED | OUTPATIENT
Start: 2024-09-11 | End: 2024-09-13 | Stop reason: HOSPADM

## 2024-09-11 RX ORDER — AMLODIPINE BESYLATE 2.5 MG/1
2.5 TABLET ORAL DAILY
Status: DISCONTINUED | OUTPATIENT
Start: 2024-09-11 | End: 2024-09-13 | Stop reason: HOSPADM

## 2024-09-11 RX ORDER — CLONAZEPAM 0.5 MG/1
0.5 TABLET ORAL
Status: DISCONTINUED | OUTPATIENT
Start: 2024-09-11 | End: 2024-09-13 | Stop reason: HOSPADM

## 2024-09-11 RX ORDER — SOTALOL HYDROCHLORIDE 80 MG/1
80 TABLET ORAL EVERY 12 HOURS SCHEDULED
Status: DISCONTINUED | OUTPATIENT
Start: 2024-09-11 | End: 2024-09-13 | Stop reason: HOSPADM

## 2024-09-11 RX ORDER — KETOCONAZOLE 20 MG/ML
SHAMPOO TOPICAL 4 TIMES DAILY
Status: DISCONTINUED | OUTPATIENT
Start: 2024-09-11 | End: 2024-09-13 | Stop reason: HOSPADM

## 2024-09-11 RX ORDER — ACETAMINOPHEN 650 MG/1
650 SUPPOSITORY RECTAL EVERY 4 HOURS PRN
Status: DISCONTINUED | OUTPATIENT
Start: 2024-09-11 | End: 2024-09-13 | Stop reason: HOSPADM

## 2024-09-11 RX ORDER — ACETAMINOPHEN 325 MG/1
650 TABLET ORAL EVERY 4 HOURS PRN
Status: DISCONTINUED | OUTPATIENT
Start: 2024-09-11 | End: 2024-09-13 | Stop reason: HOSPADM

## 2024-09-11 RX ORDER — ROSUVASTATIN CALCIUM 5 MG/1
5 TABLET, COATED ORAL EVERY EVENING
Status: DISCONTINUED | OUTPATIENT
Start: 2024-09-11 | End: 2024-09-13 | Stop reason: HOSPADM

## 2024-09-11 RX ORDER — MAGNESIUM HYDROXIDE/ALUMINUM HYDROXICE/SIMETHICONE 120; 1200; 1200 MG/30ML; MG/30ML; MG/30ML
30 SUSPENSION ORAL EVERY 4 HOURS PRN
Status: DISCONTINUED | OUTPATIENT
Start: 2024-09-11 | End: 2024-09-13 | Stop reason: HOSPADM

## 2024-09-11 RX ADMIN — CLONAZEPAM 0.5 MG: 0.5 TABLET ORAL at 20:08

## 2024-09-11 RX ADMIN — AMLODIPINE BESYLATE 2.5 MG: 2.5 TABLET ORAL at 10:40

## 2024-09-11 RX ADMIN — APIXABAN 5 MG: 5 TABLET, FILM COATED ORAL at 20:08

## 2024-09-11 RX ADMIN — SOTALOL HYDROCHLORIDE 80 MG: 80 TABLET ORAL at 10:40

## 2024-09-11 RX ADMIN — ROSUVASTATIN CALCIUM 5 MG: 5 TABLET, FILM COATED ORAL at 20:07

## 2024-09-11 RX ADMIN — SOTALOL HYDROCHLORIDE 80 MG: 80 TABLET ORAL at 20:08

## 2024-09-11 ASSESSMENT — ACTIVITIES OF DAILY LIVING (ADL)
ADLS_ACUITY_SCORE: 35
ADLS_ACUITY_SCORE: 33
ADLS_ACUITY_SCORE: 35

## 2024-09-11 NOTE — PROGRESS NOTES
EP Updated note:     EKG 1 hour post sotalol dose showed AF with CVR at 63 bpm.  and QTc 427.     Continue with current dose of sotalol 80 mg BID    NPO for possible DCCV tomorrow.     Erinn Willoughby, CLYDE, CNP

## 2024-09-11 NOTE — H&P
[facility]    History and Physical  Electrophysiology: Dr. Gutiérrez   Date of Admission:  9/11/2024  Date of Service (when I saw the patient): 09/11/24    Assessment & Plan   Keith Kevin is a 74 year old male who presents for an elective admission for sotalol initiation.     Persistent atrial fibrillation/typical atrial flutter  Recurrent AF despite flecainide and developed rate related LBBB and flecainide was stopped   Previously discussion of AAD vs ablation and elected for AAD. Initially, the plan was for dofetilide but due to cost and interactions with medication it was elected to transition to sotalol.  Echocardiogram 10/2023: LVEF of 60 to 65% with moderate AR and mild to moderate pulm aortic regurg  EKG (prior to sotalol): AF with CVR at 77 bpm, QT/QTc 404/457  Last dose of metoprolol Monday evening  Patient also stopped OTC calcium and vitamin D supplements  Estimated Creatinine Clearance: 78.5 mL/min (based on SCr of 0.95 mg/dL).    Plan:   Start Sotalol 80 mg BID   Estimated Creatinine Clearance: 78.5 mL/min (based on SCr of 0.95 mg/dL).  Keep telemetry at all times.  Repeat ECG 1 hr after the 1st dose  Repeat ECG daily  NPO from midnight for possible DCCV tonight if does not convert overnight    Dosing: Renal Function Adjustment  CrCl >60 mL/minute: Administer every 12 hours.  CrCl 40 to 60 mL/minute: Administer every 24 hours  CrCl <40 mL/minute: Use is contraindicated.    Dosing: Adjustment for Toxicity  QTc ?500 msec during initiation period:  Reduce dose or discontinue sotalol  During maintenance therapy:  QTc ?520 msec (or JT interval ?430 msec if the QRS >100 msec): Reduce dose and carefully monitor QTc until <520 msec. If QTc interval ?520 msec on the lowest maintenance dose, discontinue sotalol.  QTc ?550 msec: Discontinue Sotalol.    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  His past medical history includes persistent symptomatic atrial fibrillation/atrial flutter.  The patient was previously on flecainide that was discontinued due to abnormal stress test.  Amlodipine was started for atypical chest discomfort.  He remained symptomatic despite efforts of rate control.  We discussed rhythm control options of antiarrhythmic therapy versus ablation and patient elected for the former.  Initially the plan was for admission for dofetilide loading, but due to cost interaction with medications it was changed to sotalol.    CLYDE Palomares CNP    Code Status   Full Code    Primary Care Physician   Abram Villanueva    Chief Complaint   Symptomatic persistent atrial fibrillation    History is obtained from the patient and EPIC chart    Past Medical History    I have reviewed this patient's medical history and updated it with pertinent information if needed.   Past Medical History:   Diagnosis Date    Aortic regurgitation     moderate    New onset atrial fibrillation (H)     Varicose veins of lower extremities with inflammation      Past Surgical History   I have reviewed this patient's surgical history and updated it with pertinent information if needed.  Past Surgical History:   Procedure Laterality Date    ABDOMEN SURGERY  7/27/2020    APPENDECTOMY  Age 20    DAVINCI HERNIORRHAPHY INGUINAL Left 08/18/2020    Procedure: ROBOTIC ASSISTED LEFT INGUINAL HERNIA REPAIR WITH MESH;  Surgeon: Keshav Mehta MD;  Location: [facility] OR    LIGATE VEIN      Varicose veins, several times    TONSILLECTOMY & ADENOIDECTOMY  Approx age 10    [facility] LAP,HERNIA REPAIR PROC,UNLIST  2002    [facility] NONSPECIFIC PROCEDURE  2001    vein stripping       Prior to Admission Medications   Prior to Admission Medications   Prescriptions Last Dose Informant Patient Reported? Taking?   Calcium-Magnesium-Zinc 333-133-5 MG TABS per tablet Past Week  Yes Yes   Sig: Take 1 tablet by mouth daily.    Cholecalciferol (VITAMIN D) 1000 UNIT capsule Past Week  Yes Yes   Sig: Take 1 capsule by mouth daily.   FISH OIL 1200 MG OR CAPS Past Week  Yes Yes   Sig: Take 1,200 mg by mouth daily.   GLUCOSAMINE 500 MG OR CAPS Past Week  Yes Yes   Sig: Take 500 mg by mouth daily.   amLODIPine (NORVASC) 2.5 MG tablet 9/10/2024  No Yes   Sig: Take 1 tablet (2.5 mg) by mouth daily   apixaban ANTICOAGULANT (ELIQUIS ANTICOAGULANT) 5 MG tablet 9/11/2024  No Yes   Sig: Take 1 tablet (5 mg) by mouth 2 times daily   calcium carbonate (TUMS) 500 MG chewable tablet Past Week  Yes Yes   Sig: Take 1 tablet by mouth daily.   clobetasol (TEMOVATE) 0.05 % external solution 9/11/2024  Yes Yes   Sig: Apply topically twice a week. Apply to affected areas of scalp   clonazePAM (KLONOPIN) 0.5 MG tablet 9/10/2024  No Yes   Sig: Take 1 tablet (0.5 mg) by mouth nightly as needed (REM behavior disorder)   ketoconazole (NIZORAL) 2 % external shampoo Past Week  Yes Yes   Sig: Apply topically 2 times daily. Wash affected areas on face, scalp & beard. Lather & let sit 5 minutes before rinsing.   metoprolol succinate ER (TOPROL XL) 25 MG 24 hr tablet Past Week  No Yes   Sig: Take 1 tablet (25 mg) by mouth daily   metroNIDAZOLE (METROCREAM) 0.75 % external cream 9/11/2024  Yes Yes   Sig: Apply topically 2 times daily.   rosuvastatin (CRESTOR) 5 MG tablet 9/10/2024  No Yes   Sig: Take 1 tablet (5 mg) by mouth daily   Patient taking differently: Take 5 mg by mouth at bedtime.   sildenafil (VIAGRA) 100 MG tablet Past Week  No Yes   Sig: TAKE ONE TABLET BY MOUTH DAILY AS NEEDED FOR ERECTILE DYSFUNCTION. DO NOT TAKE WITH NITRO, TERAZOSIN OR DOXAZOSIN   Patient taking differently: Take 100 mg by mouth daily as needed.   tadalafil (ADCIRCA/CIALIS) 20 MG tablet Past Week  No Yes   Sig: Take 1 tablet (20 mg) by mouth every 24 hours   Patient taking differently: Take 20 mg by mouth daily as needed.      Facility-Administered Medications: None     Allergies    Allergies   Allergen Reactions    No Known Drug Allergy        Social History   I have updated and reviewed the following Social History Narrative:   Social History     Social History Narrative    Not on file     Family History   I have reviewed this patient's family history and updated it with pertinent information if needed.   Family History   Problem Relation Age of Onset    Eye Disorder Mother         glaucoma    Diabetes Mother     Cancer Sister         breast Ca, diagnosed age 49    Cardiovascular Father         Abdominal aortic aneurysm    Obesity Father     Hypertension Brother     C.A.D. No family hx of     Cancer - colorectal No family hx of     Prostate Cancer No family hx of        Review of Systems   The 10 point Review of Systems is negative.     Physical Exam   Temp: 97.8  F (36.6  C) Temp src: Oral BP: 118/70 Pulse: 82   Resp: 20 SpO2: 98 % O2 Device: None (Room air)    Vital Signs with Ranges  Temp:  [97.8  F (36.6  C)] 97.8  F (36.6  C)  Pulse:  [82] 82  Resp:  [20] 20  BP: (118)/(70) 118/70  SpO2:  [98 %] 98 %  182 lbs 3.2 oz    Constitutional: awake, alert, cooperative, no apparent distress, and appears stated age  Eyes: Lids and lashes normal, pupils equal, round and reactive to light, extra ocular muscles intact, sclera clear, conjunctiva normal  Respiratory: No increased work of breathing, good air exchange, clear to auscultation bilaterally, no crackles or wheezing  Cardiovascular: irregularly irregular rhythm, no murmur  Musculoskeletal: no lower extremity pitting edema present    Data   I personally reviewed .  Results for orders placed or performed during the hospital encounter of 09/11/24 (from the past 24 hour(s))   Magnesium   Result Value Ref Range    Magnesium 2.3 1.7 - 2.3 mg/dL   Basic metabolic panel   Result Value Ref Range    Sodium 142 135 - 145 mmol/L    Potassium 4.3 3.4 - 5.3 mmol/L    Chloride 108 (H) 98 - 107 mmol/L    Carbon Dioxide (CO2) 23 22 - 29 mmol/L    Anion Gap  11 7 - 15 mmol/L    Urea Nitrogen 19.3 8.0 - 23.0 mg/dL    Creatinine 0.95 0.67 - 1.17 mg/dL    GFR Estimate 83 >60 mL/min/1.73m2    Calcium 9.3 8.8 - 10.4 mg/dL    Glucose 95 70 - 99 mg/dL   EKG 12-lead, tracing only   Result Value Ref Range    Systolic Blood Pressure  mmHg    Diastolic Blood Pressure  mmHg    Ventricular Rate 77 BPM    Atrial Rate 258 BPM    MI Interval  ms    QRS Duration 92 ms     ms    QTc 457 ms    P Axis  degrees    R AXIS 35 degrees    T Axis 37 degrees    Interpretation ECG       Atrial fibrillation  Abnormal ECG  When compared with ECG of 15-Nov-2023 12:16, (unconfirmed)  Atrial fibrillation has replaced Atrial flutter

## 2024-09-12 ENCOUNTER — ANESTHESIA EVENT (OUTPATIENT)
Dept: SURGERY | Facility: CLINIC | Age: 74
DRG: 310 | End: 2024-09-12
Payer: COMMERCIAL

## 2024-09-12 ENCOUNTER — ANESTHESIA (OUTPATIENT)
Dept: SURGERY | Facility: CLINIC | Age: 74
DRG: 310 | End: 2024-09-12
Payer: COMMERCIAL

## 2024-09-12 LAB
INR PPP: 1.27 (ref 0.85–1.15)
MAGNESIUM SERPL-MCNC: 2.4 MG/DL (ref 1.7–2.3)
POTASSIUM SERPL-SCNC: 4.9 MMOL/L (ref 3.4–5.3)

## 2024-09-12 PROCEDURE — 99232 SBSQ HOSP IP/OBS MODERATE 35: CPT | Mod: 25 | Performed by: PHYSICIAN ASSISTANT

## 2024-09-12 PROCEDURE — 93010 ELECTROCARDIOGRAM REPORT: CPT | Performed by: INTERNAL MEDICINE

## 2024-09-12 PROCEDURE — 36415 COLL VENOUS BLD VENIPUNCTURE: CPT | Performed by: PHYSICIAN ASSISTANT

## 2024-09-12 PROCEDURE — 93005 ELECTROCARDIOGRAM TRACING: CPT

## 2024-09-12 PROCEDURE — 84132 ASSAY OF SERUM POTASSIUM: CPT | Performed by: PHYSICIAN ASSISTANT

## 2024-09-12 PROCEDURE — 93010 ELECTROCARDIOGRAM REPORT: CPT | Mod: XU | Performed by: INTERNAL MEDICINE

## 2024-09-12 PROCEDURE — 999N000184 HC STATISTIC TELEMETRY

## 2024-09-12 PROCEDURE — 92960 CARDIOVERSION ELECTRIC EXT: CPT | Performed by: NURSE ANESTHETIST, CERTIFIED REGISTERED

## 2024-09-12 PROCEDURE — 210N000002 HC R&B HEART CARE

## 2024-09-12 PROCEDURE — 250N000011 HC RX IP 250 OP 636: Performed by: STUDENT IN AN ORGANIZED HEALTH CARE EDUCATION/TRAINING PROGRAM

## 2024-09-12 PROCEDURE — 258N000003 HC RX IP 258 OP 636: Performed by: INTERNAL MEDICINE

## 2024-09-12 PROCEDURE — 85610 PROTHROMBIN TIME: CPT | Performed by: PHYSICIAN ASSISTANT

## 2024-09-12 PROCEDURE — 92960 CARDIOVERSION ELECTRIC EXT: CPT | Performed by: INTERNAL MEDICINE

## 2024-09-12 PROCEDURE — 83735 ASSAY OF MAGNESIUM: CPT | Performed by: PHYSICIAN ASSISTANT

## 2024-09-12 PROCEDURE — 99100 ANES PT EXTEME AGE<1 YR&>70: CPT | Performed by: NURSE ANESTHETIST, CERTIFIED REGISTERED

## 2024-09-12 PROCEDURE — 92960 CARDIOVERSION ELECTRIC EXT: CPT

## 2024-09-12 PROCEDURE — 92960 CARDIOVERSION ELECTRIC EXT: CPT | Performed by: STUDENT IN AN ORGANIZED HEALTH CARE EDUCATION/TRAINING PROGRAM

## 2024-09-12 PROCEDURE — 250N000013 HC RX MED GY IP 250 OP 250 PS 637: Performed by: INTERNAL MEDICINE

## 2024-09-12 PROCEDURE — 999N000010 HC STATISTIC ANES STAT CODE-CRNA PER MINUTE

## 2024-09-12 PROCEDURE — 250N000013 HC RX MED GY IP 250 OP 250 PS 637: Performed by: NURSE PRACTITIONER

## 2024-09-12 PROCEDURE — 5A2204Z RESTORATION OF CARDIAC RHYTHM, SINGLE: ICD-10-PCS | Performed by: INTERNAL MEDICINE

## 2024-09-12 PROCEDURE — 370N000017 HC ANESTHESIA TECHNICAL FEE, PER MIN

## 2024-09-12 RX ORDER — MAGNESIUM SULFATE HEPTAHYDRATE 40 MG/ML
2 INJECTION, SOLUTION INTRAVENOUS
Status: DISCONTINUED | OUTPATIENT
Start: 2024-09-12 | End: 2024-09-13 | Stop reason: HOSPADM

## 2024-09-12 RX ORDER — POTASSIUM CHLORIDE 1500 MG/1
40 TABLET, EXTENDED RELEASE ORAL
Status: DISCONTINUED | OUTPATIENT
Start: 2024-09-12 | End: 2024-09-13 | Stop reason: HOSPADM

## 2024-09-12 RX ORDER — PROPOFOL 10 MG/ML
INJECTION, EMULSION INTRAVENOUS PRN
Status: DISCONTINUED | OUTPATIENT
Start: 2024-09-12 | End: 2024-09-12

## 2024-09-12 RX ORDER — LIDOCAINE 40 MG/G
CREAM TOPICAL
Status: CANCELLED | OUTPATIENT
Start: 2024-09-12

## 2024-09-12 RX ORDER — POTASSIUM CHLORIDE 1500 MG/1
20 TABLET, EXTENDED RELEASE ORAL
Status: CANCELLED | OUTPATIENT
Start: 2024-09-12 | End: 2024-09-12

## 2024-09-12 RX ORDER — POTASSIUM CHLORIDE 1500 MG/1
20 TABLET, EXTENDED RELEASE ORAL
Status: DISCONTINUED | OUTPATIENT
Start: 2024-09-12 | End: 2024-09-13 | Stop reason: HOSPADM

## 2024-09-12 RX ORDER — SODIUM CHLORIDE 9 MG/ML
INJECTION, SOLUTION INTRAVENOUS CONTINUOUS
Status: DISCONTINUED | OUTPATIENT
Start: 2024-09-12 | End: 2024-09-13 | Stop reason: HOSPADM

## 2024-09-12 RX ORDER — BENZOCAINE/MENTHOL 6 MG-10 MG
LOZENGE MUCOUS MEMBRANE 3 TIMES DAILY PRN
Status: DISCONTINUED | OUTPATIENT
Start: 2024-09-12 | End: 2024-09-13 | Stop reason: HOSPADM

## 2024-09-12 RX ORDER — MAGNESIUM SULFATE HEPTAHYDRATE 40 MG/ML
2 INJECTION, SOLUTION INTRAVENOUS
Status: CANCELLED | OUTPATIENT
Start: 2024-09-12

## 2024-09-12 RX ADMIN — SODIUM CHLORIDE: 9 INJECTION, SOLUTION INTRAVENOUS at 12:54

## 2024-09-12 RX ADMIN — ROSUVASTATIN CALCIUM 5 MG: 5 TABLET, FILM COATED ORAL at 20:39

## 2024-09-12 RX ADMIN — CLONAZEPAM 0.5 MG: 0.5 TABLET ORAL at 20:42

## 2024-09-12 RX ADMIN — SOTALOL HYDROCHLORIDE 80 MG: 80 TABLET ORAL at 08:11

## 2024-09-12 RX ADMIN — SOTALOL HYDROCHLORIDE 80 MG: 80 TABLET ORAL at 20:39

## 2024-09-12 RX ADMIN — PROPOFOL 50 MG: 10 INJECTION, EMULSION INTRAVENOUS at 13:10

## 2024-09-12 RX ADMIN — PROPOFOL 40 MG: 10 INJECTION, EMULSION INTRAVENOUS at 13:09

## 2024-09-12 RX ADMIN — ACETAMINOPHEN 325MG 650 MG: 325 TABLET ORAL at 20:39

## 2024-09-12 RX ADMIN — AMLODIPINE BESYLATE 2.5 MG: 2.5 TABLET ORAL at 08:11

## 2024-09-12 RX ADMIN — APIXABAN 5 MG: 5 TABLET, FILM COATED ORAL at 20:39

## 2024-09-12 RX ADMIN — PROPOFOL 10 MG: 10 INJECTION, EMULSION INTRAVENOUS at 13:11

## 2024-09-12 RX ADMIN — APIXABAN 5 MG: 5 TABLET, FILM COATED ORAL at 08:11

## 2024-09-12 ASSESSMENT — ENCOUNTER SYMPTOMS: DYSRHYTHMIAS: 1

## 2024-09-12 NOTE — PLAN OF CARE
Pt axo4, ind, RA and full code. Tele afib/flutter svr/cvr. Pt denies CP and SOB. Pt here for sotalol load. Frequent pauses throughout the night with one 3 sec pause and HR in the 40s. Pt denies any symptoms.   Plan: NPO at MN for possible DCCV.

## 2024-09-12 NOTE — PROGRESS NOTES
Sedation Post Procedure Summary:    Immediately prior to starting the procedure a Time Out was conducted with procedural staff and re-confirmed the patient s name, procedure, and site/side. Md completed sedation assessment.     Consent obtained from MD after discussing the risks, benefits and alternatives.       Indication:  Sedation was required to allow for  cardioversion    Sedatives: Propofol per anesthesia    Vital signs, airway, and pulse oximetry were monitored throughout the procedure and sedation was maintained until the procedure was complete.      Patient tolerance: Patient tolerated the procedure well with no immediate complications. Successful a-fib to sinus omero cardioversion with 2 shocks needed at 150 J.    Post-procedure report given to: primary RN and pt transferred to room [facility] -1    (See Doc Flow-sheets and MAR for additional information)    Abram Gallegos RN

## 2024-09-12 NOTE — PRE-PROCEDURE
GENERAL PRE-PROCEDURE:   Procedure:  Atrial fibrillation  Date/Time:  9/12/2024 1:05 PM    Verbal consent obtained?: Yes    Written consent obtained?: Yes    Risks and benefits: Risks, benefits and alternatives were discussed    DC Plan: Appropriate discharge home plan in place for patients who are going home after procedure   Consent given by:  Patient  Patient states understanding of procedure being performed: Yes    Patient's understanding of procedure matches consent: Yes    Procedure consent matches procedure scheduled: Yes    Expected level of sedation:  Deep  Appropriately NPO:  Yes  ASA Class:  1  Mallampati  :  Grade 1- soft palate, uvula, tonsillar pillars, and posterior pharyngeal wall visible  Lungs:  Lungs clear with good breath sounds bilaterally  Heart:  A-fib  History & Physical reviewed:  History and physical reviewed and no updates needed  Statement of review:  I have reviewed the lab findings, diagnostic data, medications, and the plan for sedation

## 2024-09-12 NOTE — ANESTHESIA PREPROCEDURE EVALUATION
Anesthesia Pre-Procedure Evaluation    Patient: Keith Kevin   MRN: [id number] : 1950        Procedure : Procedure(s):  Anesthesia cardioversion          Past Medical History:   Diagnosis Date    Aortic regurgitation     moderate    New onset atrial fibrillation (H)     Varicose veins of lower extremities with inflammation       Past Surgical History:   Procedure Laterality Date    ABDOMEN SURGERY  2020    APPENDECTOMY  Age 20    DAVINCI HERNIORRHAPHY INGUINAL Left 2020    Procedure: ROBOTIC ASSISTED LEFT INGUINAL HERNIA REPAIR WITH MESH;  Surgeon: Keshav Mehta MD;  Location: [facility] OR    LIGATE VEIN      Varicose veins, several times    TONSILLECTOMY & ADENOIDECTOMY  Approx age 10    [facility] LAP,HERNIA REPAIR PROC,UNLIST      [facility] NONSPECIFIC PROCEDURE      vein stripping      Allergies   Allergen Reactions    No Known Drug Allergy       Social History     Tobacco Use    Smoking status: Never     Passive exposure: Never    Smokeless tobacco: Never   Substance Use Topics    Alcohol use: Yes     Comment: 1 beer weekly      Wt Readings from Last 1 Encounters:   24 82.6 kg (182 lb 3.2 oz)        Anesthesia Evaluation   Pt has had prior anesthetic. Type: General.    No history of anesthetic complications       ROS/MED HX  ENT/Pulmonary:    (-) sleep apnea   Neurologic:  - neg neurologic ROS     Cardiovascular:     (+) Dyslipidemia - -   -  - -   Taking blood thinners Pt has received instructions:                    dysrhythmias, a-fib,        Previous cardiac testing (10/23)   Echo: Date: 10/20/23 Results:  The visual ejection fraction is 60-65%.  Left ventricular systolic function is normal.  There is mild to moderate (1-2+) aortic regurgitation.  There is mild to moderate (1-2+) pulmonic valvular regurgitation.  Compared to prior study, there is no significant change.    Stress Test:  Date: Results:    ECG Reviewed:  Date: 24 Results:  Atrial flutter with variable A-V block  "  Abnormal ECG     Cath:  Date: Results:      METS/Exercise Tolerance: >4 METS    Hematologic:       Musculoskeletal:       GI/Hepatic:  - neg GI/hepatic ROS     Renal/Genitourinary:  - neg Renal ROS     Endo:    (-) Type II DM   Psychiatric/Substance Use:  - neg psychiatric ROS     Infectious Disease:  - neg infectious disease ROS     Malignancy:       Other:            Physical Exam    Airway        Mallampati: II   TM distance: > 3 FB   Neck ROM: full   Mouth opening: > 3 cm    Respiratory Devices and Support         Dental       (+) Minor Abnormalities - some fillings, tiny chips      Cardiovascular          Rhythm and rate: irregular     Pulmonary   pulmonary exam normal        breath sounds clear to auscultation           OUTSIDE LABS:  CBC:   Lab Results   Component Value Date    WBC 4.5 04/19/2023    WBC 7.1 07/08/2021    HGB 15.5 04/19/2023    HGB 15.7 07/08/2021    HCT 45.6 04/19/2023    HCT 46.0 07/08/2021     04/19/2023     07/08/2021     BMP:   Lab Results   Component Value Date     09/11/2024     04/22/2024    POTASSIUM 4.9 09/12/2024    POTASSIUM 4.3 09/11/2024    CHLORIDE 108 (H) 09/11/2024    CHLORIDE 105 04/22/2024    CO2 23 09/11/2024    CO2 26 04/22/2024    BUN 19.3 09/11/2024    BUN 19.0 04/22/2024    CR 0.95 09/11/2024    CR 0.87 04/22/2024    GLC 95 09/11/2024    GLC 95 04/22/2024     COAGS:   Lab Results   Component Value Date    INR 1.27 (H) 09/12/2024     POC: No results found for: \"BGM\", \"HCG\", \"HCGS\"  HEPATIC:   Lab Results   Component Value Date    ALBUMIN 4.6 04/22/2024    PROTTOTAL 7.0 04/22/2024    ALT 18 04/22/2024    AST 22 04/22/2024    ALKPHOS 61 04/22/2024    BILITOTAL 1.0 04/22/2024     OTHER:   Lab Results   Component Value Date    STEPHEN 9.3 09/11/2024    MAG 2.4 (H) 09/12/2024    TSH 1.45 04/19/2023       Anesthesia Plan    ASA Status:  3    NPO Status:  NPO Appropriate    Anesthesia Type: General.     - Airway: Native airway   Induction: Propofol. "   Maintenance: TIVA.        Consents            Postoperative Care            Comments:               Delores Palacios MD    I have reviewed the pertinent notes and labs in the chart from the past 30 days and (re)examined the patient.  Any updates or changes from those notes are reflected in this note.

## 2024-09-12 NOTE — PROGRESS NOTES
Reviewed EKG done post DCCV showing SB 47 bpm. QT/QTc looks great. SB is typical for him, even when just on Metoprolol XL 25 mg daily PTA.    PLAN:  Continue Eliquis without interruption  Continue sotalol 80 mg q12h. Hold if HR <40 bpm or sx'c pauses of >4 seconds  Likely home tomorrow after 5th dose of sotalol  4.   Reviewed RN's note from 9/12 @ 7404 - confirmed pt did NOT have a MACIEJ, just DCCV        JAYLON Gonzalez PA

## 2024-09-12 NOTE — PROGRESS NOTES
Patient is A/O x 4, Vss, a-febrile, denies pain or Palpitations, up independently in the room, tele SB with 1st deg AVB, patient had MACIEJ cardioversion this afternoon, shocked twice @ 150 Joules to SB.

## 2024-09-12 NOTE — ANESTHESIA POSTPROCEDURE EVALUATION
Patient: Keith Kevin    Procedure: Procedure(s):  Anesthesia cardioversion       Anesthesia Type:  General    Note:  Disposition: Inpatient   Postop Pain Control: Uneventful            Sign Out: Well controlled pain   PONV: No   Neuro/Psych: Uneventful            Sign Out: Acceptable/Baseline neuro status   Airway/Respiratory: Uneventful            Sign Out: Acceptable/Baseline resp. status   CV/Hemodynamics: Uneventful            Sign Out: Acceptable CV status; No obvious hypovolemia; No obvious fluid overload   Other NRE: NONE   DID A NON-ROUTINE EVENT OCCUR? No           Last vitals:  Vitals:    09/12/24 1325 09/12/24 1330 09/12/24 1340   BP: 119/65  115/67   Pulse: (!) 48  (!) 48   Resp: 16  16   Temp:      SpO2: 97% 99% 99%       Electronically Signed By: Delores Palacios MD  September 12, 2024  2:06 PM

## 2024-09-12 NOTE — PLAN OF CARE
A&O x 4. VSS. RA. Tele: Afib CVR. Per EP: Please call for greater than three second pauses during waking hours with symptoms.Up independent. Denies pain/CP/SOB. Ambulating in the hallways, tolerating well. L) PIV, saline locked. K-4.3, Mag-2.3. Plan: NPO at midnight for possible DCCV if pt doesn't convert overnight. Will continue w/plan of care.

## 2024-09-12 NOTE — PROGRESS NOTES
"[facility]    EP Progress Note    Date of Service (when I saw the patient): 09/12/2024     Assessment & Plan   Keith Kevin is a 74 year old male with h/o persistent atrial arrhythmias, and atypical CP who was admitted on 9/11/2024 for elective sotalol loading for sx'c persistent atrial fibrillation and typical AFlutter.     Persistent AFib/Typical AFlutter -   Recurrent AF despite flecainide and developed rate related LBBB and flecainide was stopped   Previously discussion of AAD vs ablation and elected for AAD. Initially, the plan was for dofetilide but due to cost and interactions with medication it was elected to transition to sotalol.  Last dose of Metoprolol XL 25 mg Monday evening    Echocardiogram 10/2023: LVEF of 60 to 65% with moderate   EKG (prior to sotalol): AF with CVR at 77 bpm, QT/QTc 404/457  Has now gotten 2 doses of sotalol 80 mg q12h; remains in AFib with SVR with pauses up to 3s.     Estimated Creatinine Clearance: 78.5 mL/min (based on SCr of 0.95 mg/dL).  Remains on AC with Eliquis. Confirms no missed doses at home and took AM before admission     PLAN:   1. DCCV today. We discussed Risk, Benefits and Indication of proceeding with direct current cardioversion (DCCV), including but not limited to use of anesthesia, surface burns, [id number] arrhythmias requiring further treatment, discomfort and stroke.  Specifically reviewed possibility of profound bradycardia following restoration of SR. The patient voiced understanding. A consent form was signed   2. GIVE Eliquis and sotalol this AM before cardioversion   3. Will assess EKG for QT once SR restored - 1-2h later   4. Likely home tomorrow after 5th dose of sotalol      Patricia Garcia PA-C, MSPAS    Interval History   Feeling \"good!\"  No c/o CP, SOB, dizziness, lightheadedness. Notes no issues with bradycardia - typical for him, even in SR    Physical Exam   Temp: 97.7  F (36.5  C) Temp src: Oral BP: " 120/80 Pulse: 62   Resp: 16 SpO2: 99 % O2 Device: None (Room air)    Vitals:    09/11/24 0900   Weight: 82.6 kg (182 lb 3.2 oz)     Vital Signs with Ranges  Temp:  [97.7  F (36.5  C)-98.3  F (36.8  C)] 97.7  F (36.5  C)  Pulse:  [54-87] 62  Resp:  [16-20] 16  BP: ()/(51-81) 120/80  SpO2:  [97 %-99 %] 99 %  No intake/output data recorded.    Telemetry: AFib 60s on exam    Constitutional: Awake, alert  Respiratory: No increased work of breathing  Cardiovascular: irregular on tele  Musculoskeletal: No visual edema    Medications   Current Facility-Administered Medications   Medication Dose Route Frequency Provider Last Rate Last Admin    medication instruction   Does not apply Continuous PRN Edwin Glass MD        Patient is already receiving anticoagulation with heparin, enoxaparin (LOVENOX), warfarin (COUMADIN)  or other anticoagulant medication   Does not apply Continuous PRN Edwin Glass MD         Current Facility-Administered Medications   Medication Dose Route Frequency Provider Last Rate Last Admin    amLODIPine (NORVASC) tablet 2.5 mg  2.5 mg Oral Daily Erinn Willoughby APRN CNP   2.5 mg at 09/11/24 1040    apixaban ANTICOAGULANT (ELIQUIS) tablet 5 mg  5 mg Oral BID Erinn Willoughby APRN CNP   5 mg at 09/11/24 2008    ketoconazole (NIZORAL) 2 % shampoo   Topical 4x Daily EkErinn leavitt APRN CNP        metroNIDAZOLE (METROCREAM) 0.75 % cream   Topical BID Erinn Willoughby APRN CNP        rosuvastatin (CRESTOR) tablet 5 mg  5 mg Oral QPM Erinn Willoughby APRN CNP   5 mg at 09/11/24 2007    sodium chloride (PF) 0.9% PF flush 3 mL  3 mL Intracatheter Q8H Edwin Glass MD   3 mL at 09/11/24 1803    sotalol (BETAPACE) tablet 80 mg  80 mg Oral Q12H KEVAN (08/20) Erinn Willoughby APRN CNP   80 mg at 09/11/24 2008       Data   I personally reviewed the EKG tracing showing AFib 53 bpm . QTc 440 ms .  Results for orders placed or performed during the hospital encounter of 09/11/24 (from  the past 24 hour(s))   Magnesium   Result Value Ref Range    Magnesium 2.3 1.7 - 2.3 mg/dL   Basic metabolic panel   Result Value Ref Range    Sodium 142 135 - 145 mmol/L    Potassium 4.3 3.4 - 5.3 mmol/L    Chloride 108 (H) 98 - 107 mmol/L    Carbon Dioxide (CO2) 23 22 - 29 mmol/L    Anion Gap 11 7 - 15 mmol/L    Urea Nitrogen 19.3 8.0 - 23.0 mg/dL    Creatinine 0.95 0.67 - 1.17 mg/dL    GFR Estimate 83 >60 mL/min/1.73m2    Calcium 9.3 8.8 - 10.4 mg/dL    Glucose 95 70 - 99 mg/dL   EKG 12-lead, tracing only   Result Value Ref Range    Systolic Blood Pressure  mmHg    Diastolic Blood Pressure  mmHg    Ventricular Rate 77 BPM    Atrial Rate 258 BPM    WY Interval  ms    QRS Duration 92 ms     ms    QTc 457 ms    P Axis  degrees    R AXIS 35 degrees    T Axis 37 degrees    Interpretation ECG       Atrial fibrillation  Abnormal ECG  When compared with ECG of 15-Nov-2023 12:16, (unconfirmed)  Atrial fibrillation has replaced Atrial flutter     EKG 12-lead, tracing only   Result Value Ref Range    Systolic Blood Pressure  mmHg    Diastolic Blood Pressure  mmHg    Ventricular Rate 63 BPM    Atrial Rate 326 BPM    WY Interval  ms    QRS Duration 94 ms     ms    QTc 427 ms    P Axis  degrees    R AXIS 32 degrees    T Axis 60 degrees    Interpretation ECG       Atrial fibrillation  Abnormal ECG  When compared with ECG of 11-Sep-2024 09:17, (unconfirmed)  No significant change was found     EKG 12-lead, tracing only   Result Value Ref Range    Systolic Blood Pressure  mmHg    Diastolic Blood Pressure  mmHg    Ventricular Rate 53 BPM    Atrial Rate 54 BPM    WY Interval  ms    QRS Duration 90 ms     ms    QTc 433 ms    P Axis  degrees    R AXIS 80 degrees    T Axis 46 degrees    Interpretation ECG       Atrial fibrillation with slow ventricular response  Abnormal ECG  When compared with ECG of 11-Sep-2024 11:27, (unconfirmed)  No significant change was found

## 2024-09-12 NOTE — PROCEDURES
[facility]    Procedure: EP Cardioversion External    Date/Time: 9/12/2024 1:18 PM    Performed by: Eugene Allison MD  Authorized by: Patricia Garcia PA-C      UNIVERSAL PROTOCOL   Site Marked: NA  Prior Images Obtained and Reviewed:  Yes  Required items: Required blood products, implants, devices and special equipment available    Patient identity confirmed:  Verbally with patient, arm band, provided demographic data and hospital-assigned identification number  Patient was reevaluated immediately before administering moderate or deep sedation or anesthesia  Confirmation Checklist:  Patient's identity using two indicators, relevant allergies, procedure was appropriate and matched the consent or emergent situation and correct equipment/implants were available  Time out: Immediately prior to the procedure a time out was called    Universal Protocol: the Joint Commission Universal Protocol was followed    Preparation: Patient was prepped and draped in usual sterile fashion       ANESTHESIA    Anesthesia was administered and monitored by anesthesiology.  See anesthesia documentation for details.    SEDATION  Patient Sedated: Yes    Sedation Type:  Deep  Sedation:  Propofol  Vital signs: Vital signs monitored during sedation      PROCEDURE DETAILS  Cardioversion basis: elective  Indications: failure of anti-arrhythmic medications  Pre-procedure rhythm: atrial fibrillation  Patient position: patient was placed in a supine position  Chest area: chest area exposed  Electrodes: pads  Electrodes placed: anterior-posterior  Number of attempts: 2    Details of Attempts:  The initial shock with 150 J, synch, biphasic, resulted in SR briefly then recurrent afib.    The second shock with 150 J, synch, biphasic, resulted in SR with atrial ectopy.   Post-procedure rhythm: normal sinus rhythm  Complications: no complications      PROCEDURE    Patient Tolerance:  Patient tolerated the procedure  well with no immediate complications   The patient confirmed that he was on anticoagulation continuously without missing doses for at least 4 weeks prior to the procedure. He is advised to continue anticoagulation after the procedure.

## 2024-09-12 NOTE — ANESTHESIA CARE TRANSFER NOTE
Patient: Keith Kevin    Procedure: Procedure(s):  Anesthesia cardioversion       Diagnosis: Irregular cardiac rhythm [I49.9]  Diagnosis Additional Information: No value filed.    Anesthesia Type:   General     Note:    Oropharynx: oropharynx clear of all foreign objects and spontaneously breathing  Level of Consciousness: drowsy  Oxygen Supplementation: nasal cannula  Level of Supplemental Oxygen (L/min / FiO2): 2  Independent Airway: airway patency satisfactory and stable  Dentition: dentition unchanged  Vital Signs Stable: post-procedure vital signs reviewed and stable  Report to RN Given: handoff report given  Destination: [facility].  Comments: Pt exhibits spontaneous respirations, all monitors and alarms on in [facility], VSS, patent IV, report and transfer of care to RN.          Vitals:  Vitals Value Taken Time   /64 09/12/24 1320   Temp     Pulse 52 09/12/24 1320   Resp 14 09/12/24 1320   SpO2 97 % 09/12/24 1320       Electronically Signed By: CLYDE Courtney CRNA  September 12, 2024  1:21 PM

## 2024-09-12 NOTE — PLAN OF CARE
/69 (BP Location: Right arm)   Pulse 59   Temp 97.5  F (36.4  C) (Oral)   Resp 16   Wt 82.6 kg (182 lb 3.2 oz)   SpO2 97%   BMI 26.00 kg/m      Patient name: Keith Kevin    Summary: Patient here with cardioversion, doing very well, independent in room, vitals stable, discharge tomorrow, tele is sinus omero with 1st degree avblock    Romero Morel, RN  [facility]  [phone]

## 2024-09-13 ENCOUNTER — TELEPHONE (OUTPATIENT)
Dept: CARDIOLOGY | Facility: CLINIC | Age: 74
End: 2024-09-13
Payer: COMMERCIAL

## 2024-09-13 VITALS
DIASTOLIC BLOOD PRESSURE: 62 MMHG | HEART RATE: 57 BPM | BODY MASS INDEX: 25.31 KG/M2 | SYSTOLIC BLOOD PRESSURE: 138 MMHG | RESPIRATION RATE: 16 BRPM | WEIGHT: 177.4 LBS | TEMPERATURE: 98 F | OXYGEN SATURATION: 96 %

## 2024-09-13 DIAGNOSIS — I48.0 PAROXYSMAL ATRIAL FIBRILLATION (H): Primary | ICD-10-CM

## 2024-09-13 LAB
ATRIAL RATE - MUSE: 258 BPM
ATRIAL RATE - MUSE: 326 BPM
ATRIAL RATE - MUSE: 326 BPM
ATRIAL RATE - MUSE: 47 BPM
ATRIAL RATE - MUSE: 54 BPM
ATRIAL RATE - MUSE: 55 BPM
DIASTOLIC BLOOD PRESSURE - MUSE: NORMAL MMHG
INTERPRETATION ECG - MUSE: NORMAL
P AXIS - MUSE: 60 DEGREES
P AXIS - MUSE: 66 DEGREES
P AXIS - MUSE: NORMAL DEGREES
PR INTERVAL - MUSE: 234 MS
PR INTERVAL - MUSE: 258 MS
PR INTERVAL - MUSE: NORMAL MS
QRS DURATION - MUSE: 90 MS
QRS DURATION - MUSE: 92 MS
QRS DURATION - MUSE: 92 MS
QRS DURATION - MUSE: 94 MS
QT - MUSE: 404 MS
QT - MUSE: 418 MS
QT - MUSE: 436 MS
QT - MUSE: 462 MS
QT - MUSE: 474 MS
QT - MUSE: 534 MS
QTC - MUSE: 427 MS
QTC - MUSE: 433 MS
QTC - MUSE: 446 MS
QTC - MUSE: 453 MS
QTC - MUSE: 457 MS
QTC - MUSE: 472 MS
R AXIS - MUSE: 32 DEGREES
R AXIS - MUSE: 35 DEGREES
R AXIS - MUSE: 54 DEGREES
R AXIS - MUSE: 68 DEGREES
R AXIS - MUSE: 79 DEGREES
R AXIS - MUSE: 80 DEGREES
SYSTOLIC BLOOD PRESSURE - MUSE: NORMAL MMHG
T AXIS - MUSE: 37 DEGREES
T AXIS - MUSE: 43 DEGREES
T AXIS - MUSE: 46 DEGREES
T AXIS - MUSE: 47 DEGREES
T AXIS - MUSE: 60 DEGREES
T AXIS - MUSE: 66 DEGREES
VENTRICULAR RATE- MUSE: 47 BPM
VENTRICULAR RATE- MUSE: 53 BPM
VENTRICULAR RATE- MUSE: 55 BPM
VENTRICULAR RATE- MUSE: 63 BPM
VENTRICULAR RATE- MUSE: 63 BPM
VENTRICULAR RATE- MUSE: 77 BPM

## 2024-09-13 PROCEDURE — 250N000013 HC RX MED GY IP 250 OP 250 PS 637: Performed by: INTERNAL MEDICINE

## 2024-09-13 PROCEDURE — 93005 ELECTROCARDIOGRAM TRACING: CPT

## 2024-09-13 PROCEDURE — 93010 ELECTROCARDIOGRAM REPORT: CPT | Performed by: INTERNAL MEDICINE

## 2024-09-13 PROCEDURE — 99239 HOSP IP/OBS DSCHRG MGMT >30: CPT | Mod: FS | Performed by: PHYSICIAN ASSISTANT

## 2024-09-13 PROCEDURE — 250N000013 HC RX MED GY IP 250 OP 250 PS 637: Performed by: NURSE PRACTITIONER

## 2024-09-13 RX ORDER — SOTALOL HYDROCHLORIDE 80 MG/1
80 TABLET ORAL EVERY 12 HOURS
Qty: 60 TABLET | Refills: 0 | Status: SHIPPED | OUTPATIENT
Start: 2024-09-13 | End: 2024-09-19 | Stop reason: ALTCHOICE

## 2024-09-13 RX ADMIN — SOTALOL HYDROCHLORIDE 80 MG: 80 TABLET ORAL at 08:15

## 2024-09-13 RX ADMIN — AMLODIPINE BESYLATE 2.5 MG: 2.5 TABLET ORAL at 08:15

## 2024-09-13 RX ADMIN — APIXABAN 5 MG: 5 TABLET, FILM COATED ORAL at 08:15

## 2024-09-13 RX ADMIN — ACETAMINOPHEN 325MG 650 MG: 325 TABLET ORAL at 08:48

## 2024-09-13 ASSESSMENT — ACTIVITIES OF DAILY LIVING (ADL)
ADLS_ACUITY_SCORE: 35

## 2024-09-13 NOTE — PLAN OF CARE
A&ox4. VSS. RA. No CP/SOB reported. Tele: SB 1st degree AVB, lowest HR 45. Indp in the room. Voiding.   Plan: 5th dose sotalol this morning, pending EKG - possible discharge home

## 2024-09-13 NOTE — DISCHARGE SUMMARY
[facility]    Discharge Summary  Electrophysiology     Date of Admission:  9/11/2024  Date of Discharge:  9/13/2024  Discharging Provider: Patricia Garcia PA-C  Date of Service (when I saw the patient): 09/13/24    Discharge Diagnoses   Persistent AFib and Typical AFlutter -   Started on sotalol 80 mg q12h 9/11/2024  DCCV 9/12/2024      History of Present Illness   Keith Kevin is an 74 year old male who presented with persistent atrial arrhythmias for elective sotalol loading.  He had developed rate-related LBBB on flecainide and had recurrent arrhythmias once this was stopped. Due to cost and interactions with medications, opted to start sotalol in lieu of dofetilide.     Hospital Course   Keith Kevin was admitted on 9/11/2024.  The following problems were addressed during his hospitalization:    Persistent AFib/Typical AFlutter -   Recurrent AF despite flecainide and developed rate related LBBB and flecainide was stopped   Previously discussion of AAD vs ablation and elected for AAD. Initially, the plan was for dofetilide but due to cost and interactions with medication it was elected to transition to sotalol.  Last dose of Metoprolol XL 25 mg Monday evening     Echocardiogram 10/2023: LVEF of 60 to 65% with moderate   EKG (prior to sotalol): AF with CVR at 77 bpm, QT/QTc 404/457  DCCV 9/12/2024 restored SB after 2 shocks  EKG this AM in SR showed SB 55 bpm (2 h after last sotalol given)    Remains on AC with Eliquis for CHADVASc 2 (age - on amlodipine for CP, not HTN)       PLAN:   1. Discharge home today   2. EKG scheduled in [place] 9/19 @ 1:15. We will review and plan 90 day supply of sotalol being sent to [facility] ([place]) at that time   3. See Evette Maldonado in [place] for follow-up to ensure sotalol is working well to control AFib 11/12/2024 @ 8 AM    4. Continue AC with Eliquis without interruption      Patricia Garcia PA-C,  [facility]    Significant Results and Procedures   DCCV 9/12/2024, successfully restoring SR  EKGs:  Baseline EKG 9/11/2024: AFib/CVR 77 bpm, QT/QTc 404/457  EKG following 3rd dose of sotalol 80 mg q12h after DCCV 9/12/2024: SB 47 bpm QTc 480 ms  EKG this AM, 2h after 5th dose sotalol given looked great -SB 55 bpm with PAC. QT/QTc ~470/450 ms        Code Status   Full Code    Primary Care Physician   Abram Villanueva    Physical Exam   Temp: 97.6  F (36.4  C) Temp src: Oral BP: 117/65 Pulse: 59   Resp: 16 SpO2: 97 % O2 Device: None (Room air) Oxygen Delivery: 2 LPM  Vitals:    09/11/24 0900 09/13/24 0400   Weight: 82.6 kg (182 lb 3.2 oz) 80.5 kg (177 lb 6.4 oz)     Vital Signs with Ranges  Temp:  [97.5  F (36.4  C)-97.9  F (36.6  C)] 97.6  F (36.4  C)  Pulse:  [7-74] 59  Resp:  [12-16] 16  BP: (115-148)/(63-90) 117/65  SpO2:  [96 %-99 %] 97 %  No intake/output data recorded.    Constitutional: Awake, alert  Eyes: Lids/lashes wnl  ENT: Normocephalic   Respiratory: no increased work of breathing  Cardiovascular: Regular on telemetry  GI: Not visually distended  Skin: No significant bruising  Musculoskeletal: no visual edema  Neurologic: Alert/oriented    Time Spent on this Encounter   IPatricia PA-C, personally saw the patient today and spent less than or equal to 30 minutes discharging this patient.    Discharge Disposition   Discharged to home  Condition at discharge: Stable    Consultations This Hospital Stay   SMOKING CESSATION PROGRAM IP CONSULT    Discharge Orders   No discharge procedures on file.  Discharge Medications   Current Discharge Medication List        CONTINUE these medications which have NOT CHANGED    Details   amLODIPine (NORVASC) 2.5 MG tablet Take 1 tablet (2.5 mg) by mouth daily  Qty: 90 tablet, Refills: 3    Associated Diagnoses: Chest pain, unspecified type      apixaban ANTICOAGULANT (ELIQUIS ANTICOAGULANT) 5 MG tablet Take 1 tablet (5 mg) by mouth 2 times daily  Qty: 180 tablet,  Refills: 3    Associated Diagnoses: Paroxysmal atrial fibrillation (H)      calcium carbonate (TUMS) 500 MG chewable tablet Take 1 tablet by mouth daily.  Qty: 100 tablet, Refills: 3      Calcium-Magnesium-Zinc 333-133-5 MG TABS per tablet Take 1 tablet by mouth daily.      Cholecalciferol (VITAMIN D) 1000 UNIT capsule Take 1 capsule by mouth daily.  Qty: 100 capsule, Refills: 12      clobetasol (TEMOVATE) 0.05 % external solution Apply topically twice a week. Apply to affected areas of scalp      clonazePAM (KLONOPIN) 0.5 MG tablet Take 1 tablet (0.5 mg) by mouth nightly as needed (REM behavior disorder)  Qty: 30 tablet, Refills: 5    Associated Diagnoses: RBD (REM behavioral disorder)      FISH OIL 1200 MG OR CAPS Take 1,200 mg by mouth daily.      GLUCOSAMINE 500 MG OR CAPS Take 500 mg by mouth daily.  Refills: 0    Associated Diagnoses: Routine general medical examination at a health care facility      ketoconazole (NIZORAL) 2 % external shampoo Apply topically 2 times daily. Wash affected areas on face, scalp & beard. Lather & let sit 5 minutes before rinsing.      metoprolol succinate ER (TOPROL XL) 25 MG 24 hr tablet Take 1 tablet (25 mg) by mouth daily  Qty: 90 tablet, Refills: 4    Associated Diagnoses: Paroxysmal atrial fibrillation (H)      metroNIDAZOLE (METROCREAM) 0.75 % external cream Apply topically 2 times daily.      rosuvastatin (CRESTOR) 5 MG tablet Take 1 tablet (5 mg) by mouth daily  Qty: 90 tablet, Refills: 4    Associated Diagnoses: Hyperlipidemia LDL goal <130      sildenafil (VIAGRA) 100 MG tablet TAKE ONE TABLET BY MOUTH DAILY AS NEEDED FOR ERECTILE DYSFUNCTION. DO NOT TAKE WITH NITRO, TERAZOSIN OR DOXAZOSIN  Qty: 30 tablet, Refills: 0    Associated Diagnoses: Erectile dysfunction, unspecified erectile dysfunction type      tadalafil (ADCIRCA/CIALIS) 20 MG tablet Take 1 tablet (20 mg) by mouth every 24 hours  Qty: 6 tablet, Refills: 11    Associated Diagnoses: Erectile dysfunction,  unspecified erectile dysfunction type           Allergies   Allergies   Allergen Reactions    No Known Drug Allergy      Data   Most Recent 3 CBC's:  Recent Labs   Lab Test 04/19/23  1002 07/08/21  1402   WBC 4.5 7.1   HGB 15.5 15.7   MCV 96 96    255      Most Recent 3 BMP's:  Recent Labs   Lab Test 09/12/24  0905 09/11/24  0823 04/22/24  0844 04/19/23  1002   NA  --  142 140 140   POTASSIUM 4.9 4.3 3.9 4.6   CHLORIDE  --  108* 105 105   CO2  --  23 26 23   BUN  --  19.3 19.0 20.5   CR  --  0.95 0.87 1.01   ANIONGAP  --  11 9 12   STEPHEN  --  9.3 9.4 9.8   GLC  --  95 95 93     Most Recent 2 LFT's:  Recent Labs   Lab Test 04/22/24  0844 06/16/23  0917   AST 22  --    ALT 18 17   ALKPHOS 61  --    BILITOTAL 1.0  --      Most Recent INR's and Anticoagulation Dosing History:  Anticoagulation Dose History          Latest Ref Rng & Units 9/12/2024   Recent Dosing and Labs   INR 0.85 - 1.15 1.27       Details                 Most Recent 3 Troponin's:No lab results found.  Most Recent Cholesterol Panel:  Recent Labs   Lab Test 04/22/24  0844   CHOL 158   LDL 83   HDL 62   TRIG 67     Most Recent 6 Bacteria Isolates From Any Culture (See EPIC Reports for Culture Details):No lab results found.  Most Recent TSH, T4 and A1c Labs:  Recent Labs   Lab Test 04/19/23  1002   TSH 1.45     Results for orders placed or performed during the hospital encounter of 09/11/24   EP Cardioversion External    Narrative    Eugene Allison MD     9/12/2024  1:21 PM  [facility]    Procedure: EP Cardioversion External    Date/Time: 9/12/2024 1:18 PM    Performed by: Eugene Allison MD  Authorized by: Patricia Garcia PA-C      UNIVERSAL PROTOCOL   Site Marked: NA  Prior Images Obtained and Reviewed:  Yes  Required items: Required blood products, implants, devices and special   equipment available    Patient identity confirmed:  Verbally with patient, arm band, provided   demographic data and  hospital-assigned identification number  Patient was reevaluated immediately before administering moderate or deep   sedation or anesthesia  Confirmation Checklist:  Patient's identity using two indicators, relevant   allergies, procedure was appropriate and matched the consent or emergent   situation and correct equipment/implants were available  Time out: Immediately prior to the procedure a time out was called    Universal Protocol: the Joint Commission Universal Protocol was followed    Preparation: Patient was prepped and draped in usual sterile fashion       ANESTHESIA    Anesthesia was administered and monitored by anesthesiology.  See   anesthesia documentation for details.    SEDATION  Patient Sedated: Yes    Sedation Type:  Deep  Sedation:  Propofol  Vital signs: Vital signs monitored during sedation      PROCEDURE DETAILS  Cardioversion basis: elective  Indications: failure of anti-arrhythmic medications  Pre-procedure rhythm: atrial fibrillation  Patient position: patient was placed in a supine position  Chest area: chest area exposed  Electrodes: pads  Electrodes placed: anterior-posterior  Number of attempts: 2    Details of Attempts:  The initial shock with 150 J, synch, biphasic,   resulted in SR briefly then recurrent afib.    The second shock with 150 J, synch, biphasic, resulted in SR with atrial   ectopy.   Post-procedure rhythm: normal sinus rhythm  Complications: no complications      PROCEDURE    Patient Tolerance:  Patient tolerated the procedure well with no immediate   complications   The patient confirmed that he was on anticoagulation continuously without   missing doses for at least 4 weeks prior to the procedure. He is advised   to continue anticoagulation after the procedure.

## 2024-09-13 NOTE — DISCHARGE INSTRUCTIONS
MEDICATION CHANGES:  Stay off of Metoprolol XL 25 mg daily  Continue sotalol 80 mg every 12 hours - we are filling Rx here x 30 days  After EKG is done Thursday in [place] @ 1:15, we will contact you to review it and at that time will plan to send Rx to [facility] ([place]) x 90 day supply as long as it's working well and causing no issues  See JIM Govea in [place] on Tuesday 11/12 @ 8 AM to make sure this is still working well    CALL if recurrent AFib or unexplained HR >100 bpm x >30 minutes or any low heart rates (<45 bpm) that you symptomatic with (lightheadedness, dizziness, fatigue, etc).  AFib RNs: [phone]

## 2024-09-16 ENCOUNTER — PATIENT OUTREACH (OUTPATIENT)
Dept: PEDIATRICS | Facility: CLINIC | Age: 74
End: 2024-09-16
Payer: COMMERCIAL

## 2024-09-16 NOTE — TELEPHONE ENCOUNTER
Transitions of Care Outreach  Chief Complaint   Patient presents with    Hospital F/U       Most Recent Admission Date: 9/11/2024   Most Recent Admission Diagnosis:      Most Recent Discharge Date: 9/13/2024   Most Recent Discharge Diagnosis: Paroxysmal atrial fibrillation (H) - I48.0     Transitions of Care Assessment    Discharge Assessment  How are you doing now that you are home?: patient states he felt he had another run this AM- currently talking with cardiology for next steps. RN informed if any chest pain,SOB, vision changes to go back to ER.  How are your symptoms? (Red Flag symptoms escalate to triage hotline per guidelines): Unchanged  Do you know how to contact your clinic care team if you have future questions or changes to your health status? : Yes  Does the patient have their discharge instructions? : Yes  Does the patient have questions regarding their discharge instructions? : No  Were you started on any new medications or were there changes to any of your previous medications? : Yes    Follow up Plan          Future Appointments   Date Time Provider Department Center   9/19/2024  1:15 PM [facility] CARD CSS [facility]   10/23/2024  9:00 AM Renzo Corcoran MD [facility]   11/12/2024  8:00 AM Evette Maldonado PA-C [facility]       Outpatient Plan as outlined on AVS reviewed with patient.    For any urgent concerns, please contact our 24 hour nurse triage line: [phone] ([phone])       Kaur Paiz RN

## 2024-09-17 ENCOUNTER — TELEPHONE (OUTPATIENT)
Dept: CARDIOLOGY | Facility: CLINIC | Age: 74
End: 2024-09-17
Payer: COMMERCIAL

## 2024-09-19 ENCOUNTER — TELEPHONE (OUTPATIENT)
Dept: CARDIOLOGY | Facility: CLINIC | Age: 74
End: 2024-09-19

## 2024-09-19 ENCOUNTER — DOCUMENTATION ONLY (OUTPATIENT)
Dept: CARDIOLOGY | Facility: CLINIC | Age: 74
End: 2024-09-19

## 2024-09-19 DIAGNOSIS — I48.0 PAROXYSMAL ATRIAL FIBRILLATION (H): ICD-10-CM

## 2024-09-19 DIAGNOSIS — I48.19 PERSISTENT ATRIAL FIBRILLATION (H): Primary | ICD-10-CM

## 2024-09-19 PROCEDURE — 93000 ELECTROCARDIOGRAM COMPLETE: CPT | Performed by: PHYSICIAN ASSISTANT

## 2024-09-19 RX ORDER — METOPROLOL SUCCINATE 25 MG/1
25 TABLET, EXTENDED RELEASE ORAL DAILY
Qty: 90 TABLET | Refills: 2 | Status: SHIPPED | OUTPATIENT
Start: 2024-09-19

## 2024-09-19 NOTE — TELEPHONE ENCOUNTER
Unfortunately, he is back in atrial fibrillation.    Therefore, sotalol 80 mg every 12 hours failed to keep him in sinus. He cannot tolerate higher dose because of bradycardia.    Please stop sotalol and restart previous dose of metoprolol XL.    I do not believe the patient has significant enough symptoms to pursue other options for rhythm control but I will leave this to Dr. Gutiérrez to decide.    DI

## 2024-09-19 NOTE — TELEPHONE ENCOUNTER
Spoke to patient to review recommendations per Dr Glass:  Unfortunately, he is back in atrial fibrillation.     Therefore, sotalol 80 mg every 12 hours failed to keep him in sinus. He cannot tolerate higher dose because of bradycardia.     Please stop sotalol and restart previous dose of metoprolol XL.  Patient was on metoprolol XL 25mg po every day.  Medication list update in epic and pharmacy update     I do not believe the patient has significant enough symptoms to pursue other options for rhythm control but I will leave this to Dr. Gutiérrez to decide.    Will message Dr Gutiérrez for further recommendations.  Patient aware he is out of town this week returning on 9/23.  Patient provided verbal understanding regarding above.  GENEVIEVE Ortega

## 2024-09-19 NOTE — TELEPHONE ENCOUNTER
Ambulatory EKG completed today.  Reason for EKG recent sotalol load 9/11-9/13.   Will have Dr Glass review in Dr Gutiérrez's absence.  GENEVIEVE Ortega

## 2024-09-20 DIAGNOSIS — N52.9 ERECTILE DYSFUNCTION, UNSPECIFIED ERECTILE DYSFUNCTION TYPE: ICD-10-CM

## 2024-09-20 RX ORDER — SILDENAFIL 100 MG/1
TABLET, FILM COATED ORAL
Qty: 30 TABLET | Refills: 0 | Status: SHIPPED | OUTPATIENT
Start: 2024-09-20

## 2024-09-23 NOTE — TELEPHONE ENCOUNTER
9/23/24 Msg recd from Dr Gutiérrez    Try amiodarone or ablation. I prefer the latter.     Spoke w pt and explained recommendations. Pt prefers to proceed w Ablation . He is heading [employer] right now. Will have him come for H&P w Erinn on 10/9 at 240, checking w Dr Gutiérrez on whether CT is needed prior    KHerroRN 1220 pm

## 2024-09-23 NOTE — TELEPHONE ENCOUNTER
9/23/24 Msg recd from Dr Gutiérrez     No need for ct from now on with any af ablation     Order for Ablation entered, message sent to Jenelle in scheduling to call aaron Quintero 410 pm

## 2024-10-03 ENCOUNTER — TRANSFERRED RECORDS (OUTPATIENT)
Dept: HEALTH INFORMATION MANAGEMENT | Facility: CLINIC | Age: 74
End: 2024-10-03
Payer: COMMERCIAL

## 2024-10-09 ENCOUNTER — OFFICE VISIT (OUTPATIENT)
Dept: CARDIOLOGY | Facility: CLINIC | Age: 74
End: 2024-10-09
Payer: COMMERCIAL

## 2024-10-09 VITALS
HEART RATE: 84 BPM | HEIGHT: 70 IN | DIASTOLIC BLOOD PRESSURE: 72 MMHG | WEIGHT: 185 LBS | OXYGEN SATURATION: 98 % | BODY MASS INDEX: 26.48 KG/M2 | SYSTOLIC BLOOD PRESSURE: 126 MMHG

## 2024-10-09 DIAGNOSIS — I48.0 PAROXYSMAL ATRIAL FIBRILLATION (H): ICD-10-CM

## 2024-10-09 PROCEDURE — 99214 OFFICE O/P EST MOD 30 MIN: CPT | Performed by: NURSE PRACTITIONER

## 2024-10-09 PROCEDURE — G2211 COMPLEX E/M VISIT ADD ON: HCPCS | Performed by: NURSE PRACTITIONER

## 2024-10-09 NOTE — PROGRESS NOTES
Electrophysiology Clinic Progress Note  Ketih Kevin MRN# [id number]   YOB: 1950 Age: 74 year old     Primary cardiologist: Dr. Gutiérrez    Reason for visit: Atrial fibrillation     History of presenting illness:    Keith Kevin is a pleasant 74 year old patient with past medical history significant for:    Persistent atrial fibrillation: Initially diagnosed in 2021 during a pre-colonoscopy evaluation. Placed on Flecainide  with recurrent AF. Flecainide was stopped due to nuclear stress test rate related LBBB with possible perfusion defect.   Typical atrial fibrillation: diagnosed in 10/2023.   Hyperlipidemia    The patient met with Dr. Gutiérrez 3/2024 and they discussed options of rate versus rhythm control management.  At that time given his symptoms were not severe he opted for rate control.  He alerted our clinic in September noting that he was back in atrial fibrillation and was admitted to [facility] for sotalol loading and DCCV. He was discharged on sotalol 80 mg BID. Three days post discharge the patient went back into AF and sotalol was discontinued and he was started on metoprolol XL with plan to proceed with an ablation.    Harshil returns to discuss proceeding with an ablation accompanied by his wife.  He states that he is mildly symptomatic with AF with CVR (HR 60-80s) and is tolerating metoprolol as well as Eliquis.  He questions if he is having minimal symptoms if he should proceed with the ablation.  We discussed that ultimately it is up to him.  He is due for an updated echocardiogram.     Diagnotic studies:  Echocardiogram 10/2023: LVEF 60-65%, mild to moderate AR  Nuclear stress test 2023: Mild to moderate fixed defect of the basal to mid inferior inferoseptal segments that may be from left bundle branch block with stress but cannot exclude infarct.  No ischemia          Assessment and Plan:     ASSESSMENT:    Mildly symptomatic persistent atrial fibrillation/typical  atrial fibrillation  Initially diagnosed in 2021 during a pre-colonoscopy evaluation.   Placed on Flecainide with recurrent AF. Flecainide was stopped due to nuclear stress test rate related LBBB with possible perfusion defect.    Admitted for sotalol admission but had refractory AF while on sotalol 80 mg twice daily  Typical atrial fibrillation: diagnosed in 10/2023.     PLAN:     Update echocardiogram  Patient is currently considering whether he would like to proceed with ablation.  He will alert the clinic either way.      We discussed the risks, benefits and indications of proceeding with an electrophysiology study and pulmonary vein isolation/atrial fibrillation ablation, including but not limited to use of anesthesia (including intubation), peripheral vessel injury, discomfort, bruising, bleeding, esophageal injury, diaphragmatic injury, cardiac puncture and/or tamponade requiring emergency treatment, pulmonary vein stenosis requiring intervention, and stroke/TIA. We reviewed that additional procedures may be required.  We also briefly discussed post-procedural restrictions and post-procedural discomfort.  The patient voiced understanding and is willing to proceed.  A consent form will be signed by the procedural physician.      Orders this Visit:  No orders of the defined types were placed in this encounter.    No orders of the defined types were placed in this encounter.    Medications Discontinued During This Encounter   Medication Reason    tadalafil (ADCIRCA/CIALIS) 20 MG tablet Therapy completed (No AVS)    calcium carbonate (TUMS) 500 MG chewable tablet Therapy completed (No AVS)       Today's clinic visit entailed:  Review of the result(s) of each unique test - Echo, EKG, labs  Assessment requiring an independent historian(s) - significant other - Wife  The following tests were independently interpreted by me as noted in my documentation: EKG  I spent a total of 38 minutes on the day of the visit.    "Time spent by me doing chart review, history and exam, documentation and further activities per the note  Provider  Link to [facility] Help Grid     The level of medical decision making during this visit was of moderate complexity.      The longitudinal plan of care for the diagnosis(es)/condition(s) as documented were addressed during this visit. Due to the added complexity in care, I will continue to support Keith CLEVELAND Wilfredhoracedev in the subsequent management and with ongoing continuity of care.          Review of Systems:     Review of Systems:  Skin:        Eyes:       ENT:       Respiratory:       Cardiovascular:       Gastroenterology:      Genitourinary:       Musculoskeletal:       Neurologic:       Psychiatric:       Heme/Lymph/Imm:       Endocrine:                 Physical Exam:     Vitals: /72   Pulse 84   Ht 1.778 m (5' 10\")   Wt 83.9 kg (185 lb)   SpO2 98%   BMI 26.54 kg/m    Constitutional: Well nourished and in no apparent distress.  Eyes: Pupils equal, round.   HEENT: Normocephalic, atraumatic.   Neck: Supple.   Respiratory: Breathing non-labored. Lungs clear to auscultation bilaterally.  Cardiovascular:  Regular rate and rhythm, normal S1 and S2. No murmur   Skin: Warm, dry.   Extremities: No edema.  Neurologic: No gross motor deficits. Alert, awake, and oriented to person, place and time.  Psychiatric: Affect appropriate.        CURRENT MEDICATIONS:  Current Outpatient Medications   Medication Sig Dispense Refill    amLODIPine (NORVASC) 2.5 MG tablet Take 1 tablet (2.5 mg) by mouth daily 90 tablet 3    apixaban ANTICOAGULANT (ELIQUIS ANTICOAGULANT) 5 MG tablet Take 1 tablet (5 mg) by mouth 2 times daily 180 tablet 3    Calcium-Magnesium-Zinc 333-133-5 MG TABS per tablet Take 1 tablet by mouth daily.      Cholecalciferol (VITAMIN D) 1000 UNIT capsule Take 1 capsule by mouth daily. 100 capsule 12    clobetasol (TEMOVATE) 0.05 % external solution Apply topically twice a week. Apply to affected areas of " scalp      clonazePAM (KLONOPIN) 0.5 MG tablet Take 1 tablet (0.5 mg) by mouth nightly as needed (REM behavior disorder) 30 tablet 5    FISH OIL 1200 MG OR CAPS Take 1,200 mg by mouth daily.      GLUCOSAMINE 500 MG OR CAPS Take 500 mg by mouth daily.  0    ketoconazole (NIZORAL) 2 % external shampoo Apply topically 2 times daily. Wash affected areas on face, scalp & beard. Lather & let sit 5 minutes before rinsing.      metoprolol succinate ER (TOPROL XL) 25 MG 24 hr tablet Take 1 tablet (25 mg) by mouth daily. 90 tablet 2    metroNIDAZOLE (METROCREAM) 0.75 % external cream Apply topically 2 times daily.      rosuvastatin (CRESTOR) 5 MG tablet Take 1 tablet (5 mg) by mouth daily (Patient taking differently: Take 5 mg by mouth at bedtime.) 90 tablet 4    sildenafil (VIAGRA) 100 MG tablet TAKE ONE TABLET BY MOUTH DAILY AS NEEDED FOR ERECTILE DYSFUNCTION. DO NOT TAKE WITH NITRO, TERAZOSIN, OR DOXAZOSIN 30 tablet 0       ALLERGIES  Allergies   Allergen Reactions    No Known Drug Allergy          PAST MEDICAL HISTORY:  Past Medical History:   Diagnosis Date    Aortic regurgitation     moderate    New onset atrial fibrillation (H)     Varicose veins of lower extremities with inflammation        PAST SURGICAL HISTORY:  Past Surgical History:   Procedure Laterality Date    ABDOMEN SURGERY  7/27/2020    ANESTHESIA CARDIOVERSION N/A 9/12/2024    Procedure: Anesthesia cardioversion;  Surgeon: GENERIC ANESTHESIA PROVIDER;  Location: [facility] OR    APPENDECTOMY  Age 20    DAVINCI HERNIORRHAPHY INGUINAL Left 08/18/2020    Procedure: ROBOTIC ASSISTED LEFT INGUINAL HERNIA REPAIR WITH MESH;  Surgeon: Keshav Mehta MD;  Location: [facility] OR    LIGATE VEIN      Varicose veins, several times    TONSILLECTOMY & ADENOIDECTOMY  Approx age 10    [facility] LAP,HERNIA REPAIR PROC,UNLIST  2002    [facility] NONSPECIFIC PROCEDURE  2001    vein stripping       FAMILY HISTORY:  Family History   Problem Relation Age of Onset    Eye Disorder Mother         glaucoma     Diabetes Mother     Cancer Sister         breast Ca, diagnosed age 49    Cardiovascular Father         Abdominal aortic aneurysm    Obesity Father     Hypertension Brother     C.A.D. No family hx of     Cancer - colorectal No family hx of     Prostate Cancer No family hx of        SOCIAL HISTORY:  Social History     Socioeconomic History    Marital status:      Spouse name: Mary    Number of children: 2    Years of education: None    Highest education level: None   Occupational History     Employer: RETIRED     Comment: Worked as a pharmacist   Tobacco Use    Smoking status: Never     Passive exposure: Never    Smokeless tobacco: Never   Vaping Use    Vaping status: Never Used   Substance and Sexual Activity    Alcohol use: Yes     Comment: 1 beer weekly    Drug use: No    Sexual activity: Yes     Partners: Female     Birth control/protection: None   Other Topics Concern    Back Care No    Exercise Yes     Comment: daily walk and ride bike    Bike Helmet Yes    Seat Belt Yes    Parent/sibling w/ CABG, MI or angioplasty before 65F 55M? No     Social Determinants of Health     Financial Resource Strain: Low Risk  (4/19/2024)    Financial Resource Strain     Within the past 12 months, have you or your family members you live with been unable to get utilities (heat, electricity) when it was really needed?: No   Food Insecurity: Low Risk  (4/19/2024)    Food Insecurity     Within the past 12 months, did you worry that your food would run out before you got money to buy more?: No     Within the past 12 months, did the food you bought just not last and you didn t have money to get more?: No   Transportation Needs: Low Risk  (4/19/2024)    Transportation Needs     Within the past 12 months, has lack of transportation kept you from medical appointments, getting your medicines, non-medical meetings or appointments, work, or from getting things that you need?: No   Physical Activity: Sufficiently Active  (4/19/2024)    Exercise Vital Sign     Days of Exercise per Week: 7 days     Minutes of Exercise per Session: 130 min   Stress: No Stress Concern Present (4/19/2024)    [employer] [place] [employer] - Occupational Stress Questionnaire     Feeling of Stress : Not at all   Social Connections: Unknown (4/19/2024)    Social Connection and Isolation Panel [NHANES]     Frequency of Social Gatherings with Friends and Family: More than three times a week   Interpersonal Safety: Low Risk  (4/22/2024)    Interpersonal Safety     Do you feel physically and emotionally safe where you currently live?: Yes     Within the past 12 months, have you been hit, slapped, kicked or otherwise physically hurt by someone?: No     Within the past 12 months, have you been humiliated or emotionally abused in other ways by your partner or ex-partner?: No   Housing Stability: Low Risk  (4/19/2024)    Housing Stability     Do you have housing? : Yes     Are you worried about losing your housing?: No

## 2024-10-09 NOTE — LETTER
10/9/2024    Abram Villanueva MD  [address] [place] [address]    RE: Keith Kevin       Dear Colleague,     [facility] the [facility].    Electrophysiology Clinic Progress Note  Keith Kevin MRN# [id number]   YOB: 1950 Age: 74 year old     Primary cardiologist: Dr. Gutiérrez    Reason for visit: Atrial fibrillation     History of presenting illness:    Keith Kevin is a pleasant 74 year old patient with past medical history significant for:    Persistent atrial fibrillation: Initially diagnosed in 2021 during a pre-colonoscopy evaluation. Placed on Flecainide  with recurrent AF. Flecainide was stopped due to nuclear stress test rate related LBBB with possible perfusion defect.   Typical atrial fibrillation: diagnosed in 10/2023.   Hyperlipidemia    The patient met with Dr. Gutiérrez 3/2024 and they discussed options of rate versus rhythm control management.  At that time given his symptoms were not severe he opted for rate control.  He alerted our clinic in September noting that he was back in atrial fibrillation and was admitted to [facility] for sotalol loading and DCCV. He was discharged on sotalol 80 mg BID. Three days post discharge the patient went back into AF and sotalol was discontinued and he was started on metoprolol XL with plan to proceed with an ablation.    Harshil returns to discuss proceeding with an ablation accompanied by his wife.  He states that he is mildly symptomatic with AF with CVR (HR 60-80s) and is tolerating metoprolol as well as Eliquis.  He questions if he is having minimal symptoms if he should proceed with the ablation.  We discussed that ultimately it is up to him.  He is due for an updated echocardiogram.     Diagnotic studies:  Echocardiogram 10/2023: LVEF 60-65%, mild to moderate AR  Nuclear stress test 2023: Mild to moderate fixed defect of the basal to mid inferior  inferoseptal segments that may be from left bundle branch block with stress but cannot exclude infarct.  No ischemia          Assessment and Plan:     ASSESSMENT:    Mildly symptomatic persistent atrial fibrillation/typical atrial fibrillation  Initially diagnosed in 2021 during a pre-colonoscopy evaluation.   Placed on Flecainide with recurrent AF. Flecainide was stopped due to nuclear stress test rate related LBBB with possible perfusion defect.    Admitted for sotalol admission but had refractory AF while on sotalol 80 mg twice daily  Typical atrial fibrillation: diagnosed in 10/2023.     PLAN:     Update echocardiogram  Patient is currently considering whether he would like to proceed with ablation.  He will alert the clinic either way.      We discussed the risks, benefits and indications of proceeding with an electrophysiology study and pulmonary vein isolation/atrial fibrillation ablation, including but not limited to use of anesthesia (including intubation), peripheral vessel injury, discomfort, bruising, bleeding, esophageal injury, diaphragmatic injury, cardiac puncture and/or tamponade requiring emergency treatment, pulmonary vein stenosis requiring intervention, and stroke/TIA. We reviewed that additional procedures may be required.  We also briefly discussed post-procedural restrictions and post-procedural discomfort.  The patient voiced understanding and is willing to proceed.  A consent form will be signed by the procedural physician.      Orders this Visit:  No orders of the defined types were placed in this encounter.    No orders of the defined types were placed in this encounter.    Medications Discontinued During This Encounter   Medication Reason     tadalafil (ADCIRCA/CIALIS) 20 MG tablet Therapy completed (No AVS)     calcium carbonate (TUMS) 500 MG chewable tablet Therapy completed (No AVS)       Today's clinic visit entailed:  Review of the result(s) of each unique test - Echo, EKG,  "labs  Assessment requiring an independent historian(s) - significant other - Wife  The following tests were independently interpreted by me as noted in my documentation: EKG  I spent a total of 38 minutes on the day of the visit.   Time spent by me doing chart review, history and exam, documentation and further activities per the note  Provider  Link to [facility] Help Grid     The level of medical decision making during this visit was of moderate complexity.      The longitudinal plan of care for the diagnosis(es)/condition(s) as documented were addressed during this visit. Due to the added complexity in care, I will continue to support Keith Kevin in the subsequent management and with ongoing continuity of care.          Review of Systems:     Review of Systems:  Skin:        Eyes:       ENT:       Respiratory:       Cardiovascular:       Gastroenterology:      Genitourinary:       Musculoskeletal:       Neurologic:       Psychiatric:       Heme/Lymph/Imm:       Endocrine:                 Physical Exam:     Vitals: /72   Pulse 84   Ht 1.778 m (5' 10\")   Wt 83.9 kg (185 lb)   SpO2 98%   BMI 26.54 kg/m    Constitutional: Well nourished and in no apparent distress.  Eyes: Pupils equal, round.   HEENT: Normocephalic, atraumatic.   Neck: Supple.   Respiratory: Breathing non-labored. Lungs clear to auscultation bilaterally.  Cardiovascular:  Regular rate and rhythm, normal S1 and S2. No murmur   Skin: Warm, dry.   Extremities: No edema.  Neurologic: No gross motor deficits. Alert, awake, and oriented to person, place and time.  Psychiatric: Affect appropriate.        CURRENT MEDICATIONS:  Current Outpatient Medications   Medication Sig Dispense Refill     amLODIPine (NORVASC) 2.5 MG tablet Take 1 tablet (2.5 mg) by mouth daily 90 tablet 3     apixaban ANTICOAGULANT (ELIQUIS ANTICOAGULANT) 5 MG tablet Take 1 tablet (5 mg) by mouth 2 times daily 180 tablet 3     Calcium-Magnesium-Zinc 333-133-5 MG TABS per " tablet Take 1 tablet by mouth daily.       Cholecalciferol (VITAMIN D) 1000 UNIT capsule Take 1 capsule by mouth daily. 100 capsule 12     clobetasol (TEMOVATE) 0.05 % external solution Apply topically twice a week. Apply to affected areas of scalp       clonazePAM (KLONOPIN) 0.5 MG tablet Take 1 tablet (0.5 mg) by mouth nightly as needed (REM behavior disorder) 30 tablet 5     FISH OIL 1200 MG OR CAPS Take 1,200 mg by mouth daily.       GLUCOSAMINE 500 MG OR CAPS Take 500 mg by mouth daily.  0     ketoconazole (NIZORAL) 2 % external shampoo Apply topically 2 times daily. Wash affected areas on face, scalp & beard. Lather & let sit 5 minutes before rinsing.       metoprolol succinate ER (TOPROL XL) 25 MG 24 hr tablet Take 1 tablet (25 mg) by mouth daily. 90 tablet 2     metroNIDAZOLE (METROCREAM) 0.75 % external cream Apply topically 2 times daily.       rosuvastatin (CRESTOR) 5 MG tablet Take 1 tablet (5 mg) by mouth daily (Patient taking differently: Take 5 mg by mouth at bedtime.) 90 tablet 4     sildenafil (VIAGRA) 100 MG tablet TAKE ONE TABLET BY MOUTH DAILY AS NEEDED FOR ERECTILE DYSFUNCTION. DO NOT TAKE WITH NITRO, TERAZOSIN, OR DOXAZOSIN 30 tablet 0       ALLERGIES  Allergies   Allergen Reactions     No Known Drug Allergy          PAST MEDICAL HISTORY:  Past Medical History:   Diagnosis Date     Aortic regurgitation     moderate     New onset atrial fibrillation (H)      Varicose veins of lower extremities with inflammation        PAST SURGICAL HISTORY:  Past Surgical History:   Procedure Laterality Date     ABDOMEN SURGERY  7/27/2020     ANESTHESIA CARDIOVERSION N/A 9/12/2024    Procedure: Anesthesia cardioversion;  Surgeon: GENERIC ANESTHESIA PROVIDER;  Location: [facility] OR     APPENDECTOMY  Age 20     DAVINCI HERNIORRHAPHY INGUINAL Left 08/18/2020    Procedure: ROBOTIC ASSISTED LEFT INGUINAL HERNIA REPAIR WITH MESH;  Surgeon: Keshav Mehta MD;  Location: [facility] OR     LIGATE VEIN      Varicose veins, several  times     TONSILLECTOMY & ADENOIDECTOMY  Approx age 10     [facility] LAP,HERNIA REPAIR PROC,UNLIST  2002     ZZ NONSPECIFIC PROCEDURE  2001    vein stripping       FAMILY HISTORY:  Family History   Problem Relation Age of Onset     Eye Disorder Mother         glaucoma     Diabetes Mother      Cancer Sister         breast Ca, diagnosed age 49     Cardiovascular Father         Abdominal aortic aneurysm     Obesity Father      Hypertension Brother      C.A.D. No family hx of      Cancer - colorectal No family hx of      Prostate Cancer No family hx of        SOCIAL HISTORY:  Social History     Socioeconomic History     Marital status:      Spouse name: Mary     Number of children: 2     Years of education: None     Highest education level: None   Occupational History     Employer: RETIRED     Comment: Worked as a pharmacist   Tobacco Use     Smoking status: Never     Passive exposure: Never     Smokeless tobacco: Never   Vaping Use     Vaping status: Never Used   Substance and Sexual Activity     Alcohol use: Yes     Comment: 1 beer weekly     Drug use: No     Sexual activity: Yes     Partners: Female     Birth control/protection: None   Other Topics Concern     Back Care No     Exercise Yes     Comment: daily walk and ride bike     Bike Helmet Yes     Seat Belt Yes     Parent/sibling w/ CABG, MI or angioplasty before 65F 55M? No     Social Determinants of Health     Financial Resource Strain: Low Risk  (4/19/2024)    Financial Resource Strain      Within the past 12 months, have you or your family members you live with been unable to get utilities (heat, electricity) when it was really needed?: No   Food Insecurity: Low Risk  (4/19/2024)    Food Insecurity      Within the past 12 months, did you worry that your food would run out before you got money to buy more?: No      Within the past 12 months, did the food you bought just not last and you didn t have money to get more?: No   Transportation Needs: Low Risk   (4/19/2024)    Transportation Needs      Within the past 12 months, has lack of transportation kept you from medical appointments, getting your medicines, non-medical meetings or appointments, work, or from getting things that you need?: No   Physical Activity: Sufficiently Active (4/19/2024)    Exercise Vital Sign      Days of Exercise per Week: 7 days      Minutes of Exercise per Session: 130 min   Stress: No Stress Concern Present (4/19/2024)    [employer] [place] [employer] - Occupational Stress Questionnaire      Feeling of Stress : Not at all   Social Connections: Unknown (4/19/2024)    Social Connection and Isolation Panel [NHANES]      Frequency of Social Gatherings with Friends and Family: More than three times a week   Interpersonal Safety: Low Risk  (4/22/2024)    Interpersonal Safety      Do you feel physically and emotionally safe where you currently live?: Yes      Within the past 12 months, have you been hit, slapped, kicked or otherwise physically hurt by someone?: No      Within the past 12 months, have you been humiliated or emotionally abused in other ways by your partner or ex-partner?: No   Housing Stability: Low Risk  (4/19/2024)    Housing Stability      Do you have housing? : Yes      Are you worried about losing your housing?: No               Thank you for allowing me to participate in the care of your patient.      Sincerely,     CLYDE Palomares [facility]  cc:   Abram Villanueva MD  [address] [place] [address]

## 2024-10-09 NOTE — PATIENT INSTRUCTIONS
Today's Recommendations    Please let us know either way if you would like to proceed with ablation.   If you decide to not have the ablation, follow up in 1 year.  Please schedule echo    Please send [employer] message or call for further questions or concerns.     It was a pleasure to see you today.     Gordo-    Erinn Willoughby, APRN, CNP    EP [phone]  Device [phone]  General scheduling and after hours number [phone]  EP scheduling [phone]

## 2024-10-11 ENCOUNTER — MYC MEDICAL ADVICE (OUTPATIENT)
Dept: CARDIOLOGY | Facility: CLINIC | Age: 74
End: 2024-10-11
Payer: COMMERCIAL

## 2024-10-11 ENCOUNTER — TELEPHONE (OUTPATIENT)
Dept: CARDIOLOGY | Facility: CLINIC | Age: 74
End: 2024-10-11
Payer: COMMERCIAL

## 2024-10-11 NOTE — TELEPHONE ENCOUNTER
10/11/24 Pt LVM stating he is on his way in for his echo but has sutures where the echo probe may need to be . Wondering if echo can still be done today.  Called pt back, he is currently checking in for echo and stated he will talk w echo tech about issue and will re-schedule if needed  KHKennedy 840 am

## 2024-10-17 ENCOUNTER — TRANSFERRED RECORDS (OUTPATIENT)
Dept: HEALTH INFORMATION MANAGEMENT | Facility: CLINIC | Age: 74
End: 2024-10-17
Payer: COMMERCIAL

## 2024-10-18 ASSESSMENT — SLEEP AND FATIGUE QUESTIONNAIRES
HOW LIKELY ARE YOU TO NOD OFF OR FALL ASLEEP WHILE SITTING AND READING: WOULD NEVER DOZE
HOW LIKELY ARE YOU TO NOD OFF OR FALL ASLEEP WHILE WATCHING TV: WOULD NEVER DOZE
HOW LIKELY ARE YOU TO NOD OFF OR FALL ASLEEP WHILE SITTING INACTIVE IN A PUBLIC PLACE: WOULD NEVER DOZE
HOW LIKELY ARE YOU TO NOD OFF OR FALL ASLEEP IN A CAR, WHILE STOPPED FOR A FEW MINUTES IN TRAFFIC: WOULD NEVER DOZE
HOW LIKELY ARE YOU TO NOD OFF OR FALL ASLEEP WHEN YOU ARE A PASSENGER IN A CAR FOR AN HOUR WITHOUT A BREAK: WOULD NEVER DOZE
HOW LIKELY ARE YOU TO NOD OFF OR FALL ASLEEP WHILE SITTING QUIETLY AFTER LUNCH WITHOUT ALCOHOL: WOULD NEVER DOZE
HOW LIKELY ARE YOU TO NOD OFF OR FALL ASLEEP WHILE SITTING AND TALKING TO SOMEONE: WOULD NEVER DOZE
HOW LIKELY ARE YOU TO NOD OFF OR FALL ASLEEP WHILE LYING DOWN TO REST IN THE AFTERNOON WHEN CIRCUMSTANCES PERMIT: WOULD NEVER DOZE

## 2024-10-23 ENCOUNTER — VIRTUAL VISIT (OUTPATIENT)
Dept: SLEEP MEDICINE | Facility: CLINIC | Age: 74
End: 2024-10-23
Payer: COMMERCIAL

## 2024-10-23 VITALS
HEIGHT: 70 IN | SYSTOLIC BLOOD PRESSURE: 105 MMHG | WEIGHT: 180 LBS | DIASTOLIC BLOOD PRESSURE: 63 MMHG | BODY MASS INDEX: 25.77 KG/M2

## 2024-10-23 DIAGNOSIS — G47.52 RBD (REM BEHAVIORAL DISORDER): Primary | ICD-10-CM

## 2024-10-23 PROCEDURE — 99215 OFFICE O/P EST HI 40 MIN: CPT | Mod: 95 | Performed by: PSYCHIATRY & NEUROLOGY

## 2024-10-23 ASSESSMENT — PAIN SCALES - GENERAL: PAINLEVEL_OUTOF10: NO PAIN (0)

## 2024-10-23 NOTE — NURSING NOTE
Current patient location: [address] [place] [address]    Is the patient currently in the state of [place]? YES    Visit mode:VIDEO    If the visit is dropped, the patient can be reconnected by: VIDEO VISIT: Text to cell phone:   Telephone Information:   Mobile [phone]       Will anyone else be joining the visit? NO  (If patient encounters technical issues they should call [phone]    Are changes needed to the allergy or medication list? No    Are refills needed on medications prescribed by this physician? NO    Rooming Documentation:  Questionnaire(s) completed    Reason for visit: RECHECK    Magdalena SWEENEYF

## 2024-10-23 NOTE — PROGRESS NOTES
Virtual Visit Details    Originating Location (pt. Location): Home    Distant Location (provider location):  On-site  Platform used for Video Visit: AmWell    Brief sleep staff note    Follow up [facility]    Patient has a suspected diagnosis of RBD (compelling history of dream enactment but has not had a PSG).     The patients symptoms concerning for RBD with a history of dream enactment dating back to 2022. Behaviors predominate in the second half of the night are associated with movements and vocalizations consistent with dream enactment.  Example: people attacking in dream, and he was trashing and kicking.      Current treatment for RBD includes:  -clonazepam 0.5mg stable dose since Feb of 2023    With this treatment regimen his dream enactment has improved and is currently having approximately 0 concerning episodes of dream enactment per month/week (only two episodes at all since February of 2023).    These agents have been well tolerated without significant side effects.     Patient would like to continue on these treatments.     Neurodegenerative Symptoms screen:  Anosmia (-)  Constipation (+): on metamucil  Orthostasis (-)  Tremor (-)  Hallucinations (+): possible vague shape out of corner of his eye    The patient is not on an antidepressent.    Retired:   Walk five miles every day    Social History: grew up in [facility]  No pesticide exposure  No welding exposure    Family history: mother with dementia    Assessment/Plan  Patient with NEREIDA well controlled on current medications, but no PSG.  Will order a PSG now.  Will continue current treatments.   Patient to follow up in RBD clinic after PSG.     Discussed the clinical significant of possible REM sleep behavior disorder in particular the risk for sleep related injury as well as the potential for neurodegenerative diseases such as Parkinson's disease and Dementia with Lewy Bodies.     In the meanwhile we  discussed the critical important of removing weapons from the bedroom and bedroom safety.  Patient indicates understanding.      All questions were answered.    It is a great privilege being asked to participate in this patients care.  The patient has been advised on the importance in of never operating operating a motor vehicle while tired or sleepy.        I visited with the patient directly but also extensively reviewed chart and coordinated care. Total time spent in the care of this patient today (Face-to-Face time + chart time, , and coordination of care) was greater than 40 minutes.

## 2024-10-25 ASSESSMENT — SLEEP AND FATIGUE QUESTIONNAIRES
HOW LIKELY ARE YOU TO NOD OFF OR FALL ASLEEP WHEN YOU ARE A PASSENGER IN A CAR FOR AN HOUR WITHOUT A BREAK: WOULD NEVER DOZE
HOW LIKELY ARE YOU TO NOD OFF OR FALL ASLEEP WHILE SITTING QUIETLY AFTER LUNCH WITHOUT ALCOHOL: WOULD NEVER DOZE
HOW LIKELY ARE YOU TO NOD OFF OR FALL ASLEEP WHILE LYING DOWN TO REST IN THE AFTERNOON WHEN CIRCUMSTANCES PERMIT: WOULD NEVER DOZE
HOW LIKELY ARE YOU TO NOD OFF OR FALL ASLEEP WHILE SITTING INACTIVE IN A PUBLIC PLACE: WOULD NEVER DOZE
HOW LIKELY ARE YOU TO NOD OFF OR FALL ASLEEP WHILE SITTING AND READING: WOULD NEVER DOZE
HOW LIKELY ARE YOU TO NOD OFF OR FALL ASLEEP IN A CAR, WHILE STOPPED FOR A FEW MINUTES IN TRAFFIC: WOULD NEVER DOZE
HOW LIKELY ARE YOU TO NOD OFF OR FALL ASLEEP WHILE SITTING AND TALKING TO SOMEONE: WOULD NEVER DOZE
HOW LIKELY ARE YOU TO NOD OFF OR FALL ASLEEP WHILE WATCHING TV: WOULD NEVER DOZE

## 2024-10-30 ENCOUNTER — THERAPY VISIT (OUTPATIENT)
Dept: SLEEP MEDICINE | Facility: CLINIC | Age: 74
End: 2024-10-30
Attending: PSYCHIATRY & NEUROLOGY
Payer: COMMERCIAL

## 2024-10-30 ENCOUNTER — HOSPITAL ENCOUNTER (OUTPATIENT)
Dept: CARDIOLOGY | Facility: CLINIC | Age: 74
Discharge: HOME | End: 2024-10-30
Attending: INTERNAL MEDICINE | Admitting: INTERNAL MEDICINE
Payer: COMMERCIAL

## 2024-10-30 DIAGNOSIS — G47.52 RBD (REM BEHAVIORAL DISORDER): ICD-10-CM

## 2024-10-30 PROCEDURE — 93306 TTE W/DOPPLER COMPLETE: CPT | Mod: 26 | Performed by: INTERNAL MEDICINE

## 2024-10-30 PROCEDURE — 93306 TTE W/DOPPLER COMPLETE: CPT

## 2024-10-30 PROCEDURE — 95810 POLYSOM 6/> YRS 4/> PARAM: CPT | Performed by: PSYCHIATRY & NEUROLOGY

## 2024-11-06 LAB — SLPCOMP: NORMAL

## 2024-11-06 NOTE — PROCEDURES
" SLEEP STUDY INTERPRETATION  DIAGNOSTIC POLYSOMNOGRAPHY REPORT      Patient: EYAD BARRAGAN  YOB: 1950  Study Date: 10/30/2024  MRN: [id number]  Referring Provider: Dustin Nuñez PA-C  Ordering Provider: Desiree Muller    Indications for Polysomnography: The patient is a 74 year old Male who is 5' 10\" and weighs 180.0 lbs. His BMI is 26.1, [place] sleepiness scale 03 and neck circumference is 40 cm. Relevant medical history includes dream enactment, atrial fibrillation. A diagnostic polysomnogram was performed to evaluate for RBD.    Polysomnogram Data: A full night polysomnogram recorded the standard physiologic parameters including EEG, EOG, EMG, ECG, nasal and oral airflow. Respiratory parameters of chest and abdominal movements were recorded with respiratory inductance plethysmography. Oxygen saturation was recorded by pulse oximetry. Hypopnea scoring rule used: 1B 4%.    Sleep Architecture: Sleep fragmentation  The total recording time of the polysomnogram was 554.5 minutes. The total sleep time was 445.5 minutes. Sleep latency was 14.0 minutes. REM latency was 90.0 minutes. Arousal index was 37.6 arousals per hour. Sleep efficiency was 80.3%. Wake after sleep onset was 95.0 minutes. The patient spent 17.3% of total sleep time in Stage N1, 54.7% in Stage N2, 12.1% in Stage N3, and 15.9% in REM. Time in REM supine was 28.5 minutes.    Respiration: Mild DIANNA  Events ? The polysomnogram revealed a presence of 43 obstructive, 18 central, and 4 mixed apneas resulting in an apnea index of 8.8 events per hour. There were 8 obstructive hypopneas and - central hypopneas resulting in an obstructive hypopnea index of 1.1 and central hypopnea index of - events per hour. The combined apnea/hypopnea index was 9.8 events per hour (central apnea/hypopnea index was 2.4 events per hour). The REM AHI was 22.8 events per hour. The supine AHI was 27.9 events per hour. The RERA index was 1.1 events per hour. "  The RDI was 10.9 events per hour.  Snoring - was reported as mild-moderate.  Respiratory rate and pattern - was notable for normal respiratory rate and pattern.  Sustained Sleep Associated Hypoventilation - Transcutaneous carbon dioxide monitoring was not used.  Sleep Associated Hypoxemia - (Greater than 5 minutes O2 sat at or below 88%) was not present. Baseline oxygen saturation was 93.8%. Lowest oxygen saturation was 88.0%. Time spent less than or equal to 88% was 0.1 minutes. Time spent less than or equal to 89% was 0.8 minutes.    Movement Activity: Mild elevation in transient REM motor activity with hand movement suggestive of dream enactment.  Elevated PLMs during NREM sleep.  Periodic Limb Activity - There were 222 PLMs during the entire study. The PLM index was 29.9 movements per hour. The PLM Arousal Index was 3.5 per hour.  REM EMG Activity - Excessive transient muscle activity was present.  Nocturnal Behavior - Abnormal sleep related behaviors were noted during REM sleep. The behaviors appeared to be consistent with dream enactment.  Bruxism - None apparent.    Cardiac Summary: Irregular, irregular rhythm with narrow QRS complexes with an absence of P waves consistent with the patients cardiac history of Afib.    The average pulse rate was 62.6 bpm. The minimum pulse rate was 45.0 bpm while the maximum pulse rate was 85.0 bpm.      Assessment:   Mild elevation in transient REM motor activity with hand movement suggestive of dream enactment consistent with REM sleep Behavior Disorder.  Elevated PLMs during NREM sleep.  Mild DIANNA  Irregular, irregular rhythm with narrow QRS complexes with an absence of P waves consistent with the patient's cardiac history of Afib.      Recommendations:  Recommend a diagnosis of and management for REM sleep Behavior Disorder:  Bedroom safety  If clinically appropriate could treat dream enactment with high dose melatonin and/or low dose clonazepam.  Longitudinal monitoring  for neurological syndromes  If deemed clinically relevant Mild DIANNA can be treated with the following options:  Dental Appliance  Auto CPAP  Position restriction to prevent the patient from sleeping supine  Continue to follow up with cardiology.  Advice regarding the risks of drowsy driving.  Suggest optimizing sleep schedule and avoiding sleep deprivation.  Treatment of PLMs (dopaminergic agents or delta-2 ligands) should be targeted at patients who either have wakeful motor restlessness or those in whom there is a high clinical suspicion of periodic limb movement disorder and not for elevated PLMs alone.    Diagnostic Codes: G47.33, G47.52      Renzo Corcoran MD 11-6-2024  Diplomate, Sleep Medicine, ABPN

## 2024-11-06 NOTE — PROGRESS NOTES
Called briefly to give results of PSG as he does not have a follow up scheduled.     RBD confirmed-on clonazepam 0.5mg doing well but had one breakthrough event recently where he walked out of bed. Advised restarting 3mg of melatonin in addition to the clonazepam.  He agreed.   -Mom  with dementia.  Will continue to follow him for neurological syndromes but he is doing well now which is a good sign. Discussed research but declined at this time.     Mild DIANNA-discussed options.  Will address with non-supine sleeping.     Afib-known diagnosis will continued to follow up with cardiology    Will follow up with me annually or earlier PRN.

## 2024-11-13 ENCOUNTER — MYC MEDICAL ADVICE (OUTPATIENT)
Dept: CARDIOLOGY | Facility: CLINIC | Age: 74
End: 2024-11-13
Payer: COMMERCIAL

## 2024-11-13 DIAGNOSIS — R07.9 CHEST PAIN, UNSPECIFIED TYPE: ICD-10-CM

## 2024-11-14 RX ORDER — AMLODIPINE BESYLATE 5 MG/1
5 TABLET ORAL DAILY
Qty: 90 TABLET | Refills: 3 | Status: SHIPPED | OUTPATIENT
Start: 2024-11-14

## 2024-11-25 DIAGNOSIS — I48.0 PAROXYSMAL ATRIAL FIBRILLATION (H): ICD-10-CM

## 2024-12-14 DIAGNOSIS — N52.9 ERECTILE DYSFUNCTION, UNSPECIFIED ERECTILE DYSFUNCTION TYPE: ICD-10-CM

## 2024-12-16 RX ORDER — SILDENAFIL 100 MG/1
TABLET, FILM COATED ORAL
Qty: 30 TABLET | Refills: 0 | Status: SHIPPED | OUTPATIENT
Start: 2024-12-16

## 2025-03-01 ENCOUNTER — HEALTH MAINTENANCE LETTER (OUTPATIENT)
Age: 75
End: 2025-03-01

## 2025-03-09 ENCOUNTER — ANESTHESIA EVENT (OUTPATIENT)
Dept: CARDIOLOGY | Facility: CLINIC | Age: 75
End: 2025-03-09
Payer: COMMERCIAL

## 2025-03-09 ASSESSMENT — ENCOUNTER SYMPTOMS: DYSRHYTHMIAS: 1

## 2025-03-09 NOTE — ANESTHESIA PREPROCEDURE EVALUATION
Anesthesia Pre-Procedure Evaluation    Patient: Keith Kevin   MRN: [id number] : 1950        Procedure : Procedure(s):  Ablation Atrial Fibrilation          Past Medical History:   Diagnosis Date    Aortic regurgitation     moderate    New onset atrial fibrillation (H)     Varicose veins of lower extremities with inflammation       Past Surgical History:   Procedure Laterality Date    ABDOMEN SURGERY  2020    ANESTHESIA CARDIOVERSION N/A 2024    Procedure: Anesthesia cardioversion;  Surgeon: GENERIC ANESTHESIA PROVIDER;  Location: [facility] OR    APPENDECTOMY  Age 20    DAVINCI HERNIORRHAPHY INGUINAL Left 2020    Procedure: ROBOTIC ASSISTED LEFT INGUINAL HERNIA REPAIR WITH MESH;  Surgeon: Keshav Mehta MD;  Location: [facility] OR    LIGATE VEIN      Varicose veins, several times    TONSILLECTOMY & ADENOIDECTOMY  Approx age 10    [facility] LAP,HERNIA REPAIR PROC,UNLIST      [facility] NONSPECIFIC PROCEDURE      vein stripping      Allergies   Allergen Reactions    No Known Drug Allergy       Social History     Tobacco Use    Smoking status: Never     Passive exposure: Never    Smokeless tobacco: Never   Substance Use Topics    Alcohol use: Yes     Comment: 1 beer weekly      Wt Readings from Last 1 Encounters:   25 83.9 kg (185 lb)      Echo 10/2024  Interpretation Summary     The left atrium is moderate to severely dilated.  The right atrium is mildly dilated.  The visual ejection fraction is 50-55%.  There is mild (1+) mitral regurgitation.  Mild (35-45mmHg) pulmonary hypertension is present.  There is mild to moderate (1-2+) aortic regurgitation.  This aortic regurgitation is central valvular.  There is mild to moderate (1-2+) pulmonic valvular regurgitation.  The rhythm was atrial fibrillation.  Compared to prior study, there is no significant change.  ______________________________________________________________________________  Left Ventricle  The left ventricle is normal in size. There is  borderline concentric left  ventricular hypertrophy. The visual ejection fraction is 50-55%. Diastolic  function not assessed due to atrial fibrillation.     Right Ventricle  The right ventricle is normal in structure, function and size.     Atria  The left atrium is moderate to severely dilated. The right atrium is mildly  dilated.     Mitral Valve  The mitral valve leaflets are mildly thickened. There is mild (1+) mitral  regurgitation.     Tricuspid Valve  Normal tricuspid valve. There is mild (1+) tricuspid regurgitation. Mild (35-  45mmHg) pulmonary hypertension is present.     Aortic Valve  There is moderate trileaflet aortic sclerosis. There is mild to moderate (1-  2+) aortic regurgitation. This aortic regurgitation is central valvular.     Pulmonic Valve  There is mild to moderate (1-2+) pulmonic valvular regurgitation.     Vessels  The aortic root is normal size. Dilation of the inferior vena cava is present  with normal respiratory variation in diameter.     Pericardium  There is no pericardial effusion.     Rhythm  The rhythm was atrial fibrillation.  _______________________________________________________________    NM treadmill 11/2023  Result Text       The nuclear stress test is abnormal.    Nuclear Study Quality: Mild to moderate fixed defect of the basal to mid inferior and inferoseptal segments, this may be from left bundle pattern that formed on EKG during stress, but cannot exclude area of infarct.  No ischemia.    The patient's exercise capacity is above average.  5/10 chest tightness at peak exercise.  No ischemic EKG changes.  At peak exercise, QRS widened to a left bundle pattern at heart rate of 130, then returned to narrow QRS early in recovery.    The left ventricular ejection fraction at rest is 50%.  The left ventricular ejection fraction at stress is 45%.    There is no prior study for comparison.     Anesthesia Evaluation   Pt has had prior anesthetic.     No history of anesthetic  "complications       ROS/MED HX  ENT/Pulmonary:    (-) sleep apnea and recent URI   Neurologic:    (-) no seizures, no CVA and migraines   Cardiovascular: Comment: Varicose veins     Hx of rate related bundle branch block    (+) Dyslipidemia - -   -  - -   Taking blood thinners                     dysrhythmias (prior cardioversion), a-fib,  valvular problems/murmurs type: AI mild/ mod AI.      (-) CHF and orthopnea/PND   METS/Exercise Tolerance:     Hematologic:       Musculoskeletal:       GI/Hepatic:  - neg GI/hepatic ROS  (-) GERD   Renal/Genitourinary:  - neg Renal ROS     Endo:  - neg endo ROS  (-) Type II DM and thyroid disease   Psychiatric/Substance Use:  - neg psychiatric ROS     Infectious Disease:  - neg infectious disease ROS     Malignancy:  - neg malignancy ROS     Other:            Physical Exam    Airway  airway exam normal      Mallampati: II   TM distance: > 3 FB   Neck ROM: full   Mouth opening: > 3 cm    Respiratory Devices and Support         Dental       (+) Minor Abnormalities - some fillings, tiny chips      Cardiovascular   cardiovascular exam normal       Rhythm and rate: regular and normal     Pulmonary   pulmonary exam normal        breath sounds clear to auscultation           OUTSIDE LABS:  CBC:   Lab Results   Component Value Date    WBC 4.5 04/19/2023    WBC 7.1 07/08/2021    HGB 15.5 04/19/2023    HGB 15.7 07/08/2021    HCT 45.6 04/19/2023    HCT 46.0 07/08/2021     04/19/2023     07/08/2021     BMP:   Lab Results   Component Value Date     09/11/2024     04/22/2024    POTASSIUM 4.9 09/12/2024    POTASSIUM 4.3 09/11/2024    CHLORIDE 108 (H) 09/11/2024    CHLORIDE 105 04/22/2024    CO2 23 09/11/2024    CO2 26 04/22/2024    BUN 19.3 09/11/2024    BUN 19.0 04/22/2024    CR 0.95 09/11/2024    CR 0.87 04/22/2024    GLC 95 09/11/2024    GLC 95 04/22/2024     COAGS:   Lab Results   Component Value Date    INR 1.27 (H) 09/12/2024     POC: No results found for: \"BGM\", " "\"HCG\", \"HCGS\"  HEPATIC:   Lab Results   Component Value Date    ALBUMIN 4.6 04/22/2024    PROTTOTAL 7.0 04/22/2024    ALT 18 04/22/2024    AST 22 04/22/2024    ALKPHOS 61 04/22/2024    BILITOTAL 1.0 04/22/2024     OTHER:   Lab Results   Component Value Date    STEPHEN 9.3 09/11/2024    MAG 2.4 (H) 09/12/2024    TSH 1.45 04/19/2023       Anesthesia Plan    ASA Status:  3    NPO Status:  NPO Appropriate    Anesthesia Type: General.     - Airway: ETT   Induction: Propofol.   Maintenance: Balanced.   Techniques and Equipment:     - Airway: Video-Laryngoscope       Consents    Anesthesia Plan(s) and associated risks, benefits, and realistic alternatives discussed. Questions answered and patient/representative(s) expressed understanding.     - Discussed:     - Discussed with:  Patient, Spouse            Postoperative Care    Pain management: Multi-modal analgesia.   PONV prophylaxis: Ondansetron (or other 5HT-3), Dexamethasone or Solumedrol     Comments:               Renzo Burch MD    I have reviewed the pertinent notes and labs in the chart from the past 30 days and (re)examined the patient.  Any updates or changes from those notes are reflected in this note.    Clinically Significant Risk Factors Present on Admission                             # Overweight: Estimated body mass index is 26.54 kg/m  as calculated from the following:    Height as of 2/7/25: 1.778 m (5' 10\").    Weight as of 2/7/25: 83.9 kg (185 lb).                "

## 2025-03-10 ENCOUNTER — ANESTHESIA (OUTPATIENT)
Dept: CARDIOLOGY | Facility: CLINIC | Age: 75
End: 2025-03-10
Payer: COMMERCIAL

## 2025-03-10 ENCOUNTER — HOSPITAL ENCOUNTER (OUTPATIENT)
Facility: CLINIC | Age: 75
Discharge: HOME | End: 2025-03-10
Attending: INTERNAL MEDICINE | Admitting: INTERNAL MEDICINE
Payer: COMMERCIAL

## 2025-03-10 VITALS
DIASTOLIC BLOOD PRESSURE: 86 MMHG | BODY MASS INDEX: 26.48 KG/M2 | OXYGEN SATURATION: 94 % | WEIGHT: 185 LBS | TEMPERATURE: 96.8 F | HEART RATE: 73 BPM | HEIGHT: 70 IN | RESPIRATION RATE: 16 BRPM | SYSTOLIC BLOOD PRESSURE: 157 MMHG

## 2025-03-10 DIAGNOSIS — I48.0 PAF (PAROXYSMAL ATRIAL FIBRILLATION) (H): ICD-10-CM

## 2025-03-10 DIAGNOSIS — I48.19 PERSISTENT ATRIAL FIBRILLATION (H): ICD-10-CM

## 2025-03-10 DIAGNOSIS — I48.0 PAROXYSMAL ATRIAL FIBRILLATION (H): Primary | ICD-10-CM

## 2025-03-10 LAB
ACT BLD: 267 SECONDS (ref 74–150)
ANION GAP SERPL CALCULATED.3IONS-SCNC: 9 MMOL/L (ref 7–15)
ATRIAL RATE - MUSE: 227 BPM
ATRIAL RATE - MUSE: 52 BPM
BUN SERPL-MCNC: 17.9 MG/DL (ref 8–23)
CALCIUM SERPL-MCNC: 9 MG/DL (ref 8.8–10.4)
CHLORIDE SERPL-SCNC: 108 MMOL/L (ref 98–107)
CREAT SERPL-MCNC: 0.86 MG/DL (ref 0.67–1.17)
DIASTOLIC BLOOD PRESSURE - MUSE: NORMAL MMHG
DIASTOLIC BLOOD PRESSURE - MUSE: NORMAL MMHG
EGFRCR SERPLBLD CKD-EPI 2021: 90 ML/MIN/1.73M2
ERYTHROCYTE [DISTWIDTH] IN BLOOD BY AUTOMATED COUNT: 12.9 % (ref 10–15)
GLUCOSE SERPL-MCNC: 100 MG/DL (ref 70–99)
HCO3 SERPL-SCNC: 25 MMOL/L (ref 22–29)
HCT VFR BLD AUTO: 44.2 % (ref 40–53)
HGB BLD-MCNC: 15.1 G/DL (ref 13.3–17.7)
INTERPRETATION ECG - MUSE: NORMAL
INTERPRETATION ECG - MUSE: NORMAL
MCH RBC QN AUTO: 32.3 PG (ref 26.5–33)
MCHC RBC AUTO-ENTMCNC: 34.2 G/DL (ref 31.5–36.5)
MCV RBC AUTO: 94 FL (ref 78–100)
P AXIS - MUSE: 39 DEGREES
P AXIS - MUSE: NORMAL DEGREES
PLATELET # BLD AUTO: 209 10E3/UL (ref 150–450)
POTASSIUM SERPL-SCNC: 4.1 MMOL/L (ref 3.4–5.3)
PR INTERVAL - MUSE: 240 MS
PR INTERVAL - MUSE: NORMAL MS
QRS DURATION - MUSE: 88 MS
QRS DURATION - MUSE: 90 MS
QT - MUSE: 454 MS
QT - MUSE: 472 MS
QTC - MUSE: 397 MS
QTC - MUSE: 438 MS
R AXIS - MUSE: 63 DEGREES
R AXIS - MUSE: 72 DEGREES
RBC # BLD AUTO: 4.68 10E6/UL (ref 4.4–5.9)
SODIUM SERPL-SCNC: 142 MMOL/L (ref 135–145)
SYSTOLIC BLOOD PRESSURE - MUSE: NORMAL MMHG
SYSTOLIC BLOOD PRESSURE - MUSE: NORMAL MMHG
T AXIS - MUSE: 52 DEGREES
T AXIS - MUSE: 57 DEGREES
VENTRICULAR RATE- MUSE: 46 BPM
VENTRICULAR RATE- MUSE: 52 BPM
WBC # BLD AUTO: 5.6 10E3/UL (ref 4–11)

## 2025-03-10 PROCEDURE — 272N000001 HC OR GENERAL SUPPLY STERILE: Performed by: INTERNAL MEDICINE

## 2025-03-10 PROCEDURE — 80048 BASIC METABOLIC PNL TOTAL CA: CPT | Performed by: INTERNAL MEDICINE

## 2025-03-10 PROCEDURE — C1732 CATH, EP, DIAG/ABL, 3D/VECT: HCPCS | Performed by: INTERNAL MEDICINE

## 2025-03-10 PROCEDURE — 36415 COLL VENOUS BLD VENIPUNCTURE: CPT | Performed by: INTERNAL MEDICINE

## 2025-03-10 PROCEDURE — 250N000011 HC RX IP 250 OP 636: Performed by: INTERNAL MEDICINE

## 2025-03-10 PROCEDURE — C1766 INTRO/SHEATH,STRBLE,NON-PEEL: HCPCS | Performed by: INTERNAL MEDICINE

## 2025-03-10 PROCEDURE — C1733 CATH, EP, OTHR THAN COOL-TIP: HCPCS | Performed by: INTERNAL MEDICINE

## 2025-03-10 PROCEDURE — 999N000184 HC STATISTIC TELEMETRY

## 2025-03-10 PROCEDURE — 999N000054 HC STATISTIC EKG NON-CHARGEABLE

## 2025-03-10 PROCEDURE — 999N000071 HC STATISTIC HEART CATH LAB OR EP LAB

## 2025-03-10 PROCEDURE — 85347 COAGULATION TIME ACTIVATED: CPT

## 2025-03-10 PROCEDURE — 85014 HEMATOCRIT: CPT | Performed by: INTERNAL MEDICINE

## 2025-03-10 PROCEDURE — 250N000009 HC RX 250: Performed by: NURSE ANESTHETIST, CERTIFIED REGISTERED

## 2025-03-10 PROCEDURE — 93656 COMPRE EP EVAL ABLTJ ATR FIB: CPT | Performed by: INTERNAL MEDICINE

## 2025-03-10 PROCEDURE — 93005 ELECTROCARDIOGRAM TRACING: CPT

## 2025-03-10 PROCEDURE — 370N000017 HC ANESTHESIA TECHNICAL FEE, PER MIN: Performed by: INTERNAL MEDICINE

## 2025-03-10 PROCEDURE — 258N000003 HC RX IP 258 OP 636: Performed by: INTERNAL MEDICINE

## 2025-03-10 PROCEDURE — C1759 CATH, INTRA ECHOCARDIOGRAPHY: HCPCS | Performed by: INTERNAL MEDICINE

## 2025-03-10 PROCEDURE — C1894 INTRO/SHEATH, NON-LASER: HCPCS | Performed by: INTERNAL MEDICINE

## 2025-03-10 PROCEDURE — C1730 CATH, EP, 19 OR FEW ELECT: HCPCS | Performed by: INTERNAL MEDICINE

## 2025-03-10 PROCEDURE — C1769 GUIDE WIRE: HCPCS | Performed by: INTERNAL MEDICINE

## 2025-03-10 PROCEDURE — 93657 TX L/R ATRIAL FIB ADDL: CPT | Performed by: INTERNAL MEDICINE

## 2025-03-10 PROCEDURE — 250N000011 HC RX IP 250 OP 636: Performed by: NURSE ANESTHETIST, CERTIFIED REGISTERED

## 2025-03-10 PROCEDURE — 250N000025 HC SEVOFLURANE, PER MIN: Performed by: INTERNAL MEDICINE

## 2025-03-10 PROCEDURE — 258N000003 HC RX IP 258 OP 636: Performed by: NURSE ANESTHETIST, CERTIFIED REGISTERED

## 2025-03-10 PROCEDURE — 93655 ICAR CATH ABLTJ DSCRT ARRHYT: CPT | Performed by: INTERNAL MEDICINE

## 2025-03-10 RX ORDER — DEXAMETHASONE SODIUM PHOSPHATE 4 MG/ML
4 INJECTION, SOLUTION INTRA-ARTICULAR; INTRALESIONAL; INTRAMUSCULAR; INTRAVENOUS; SOFT TISSUE
Status: DISCONTINUED | OUTPATIENT
Start: 2025-03-10 | End: 2025-03-10 | Stop reason: HOSPADM

## 2025-03-10 RX ORDER — PROTAMINE SULFATE 10 MG/ML
INJECTION, SOLUTION INTRAVENOUS
Status: DISCONTINUED | OUTPATIENT
Start: 2025-03-10 | End: 2025-03-10 | Stop reason: HOSPADM

## 2025-03-10 RX ORDER — NALOXONE HYDROCHLORIDE 0.4 MG/ML
0.1 INJECTION, SOLUTION INTRAMUSCULAR; INTRAVENOUS; SUBCUTANEOUS
Status: DISCONTINUED | OUTPATIENT
Start: 2025-03-10 | End: 2025-03-10 | Stop reason: HOSPADM

## 2025-03-10 RX ORDER — HYDROMORPHONE HYDROCHLORIDE 1 MG/ML
0.4 INJECTION, SOLUTION INTRAMUSCULAR; INTRAVENOUS; SUBCUTANEOUS EVERY 5 MIN PRN
Status: DISCONTINUED | OUTPATIENT
Start: 2025-03-10 | End: 2025-03-10 | Stop reason: HOSPADM

## 2025-03-10 RX ORDER — ONDANSETRON 2 MG/ML
4 INJECTION INTRAMUSCULAR; INTRAVENOUS EVERY 30 MIN PRN
Status: DISCONTINUED | OUTPATIENT
Start: 2025-03-10 | End: 2025-03-10 | Stop reason: HOSPADM

## 2025-03-10 RX ORDER — LIDOCAINE HYDROCHLORIDE 20 MG/ML
INJECTION, SOLUTION INFILTRATION; PERINEURAL PRN
Status: DISCONTINUED | OUTPATIENT
Start: 2025-03-10 | End: 2025-03-10

## 2025-03-10 RX ORDER — HYDROMORPHONE HYDROCHLORIDE 1 MG/ML
0.2 INJECTION, SOLUTION INTRAMUSCULAR; INTRAVENOUS; SUBCUTANEOUS EVERY 5 MIN PRN
Status: DISCONTINUED | OUTPATIENT
Start: 2025-03-10 | End: 2025-03-10 | Stop reason: HOSPADM

## 2025-03-10 RX ORDER — HEPARIN SODIUM 1000 [USP'U]/ML
INJECTION, SOLUTION INTRAVENOUS; SUBCUTANEOUS
Status: DISCONTINUED | OUTPATIENT
Start: 2025-03-10 | End: 2025-03-10 | Stop reason: HOSPADM

## 2025-03-10 RX ORDER — NITROGLYCERIN 5 MG/ML
VIAL (ML) INTRAVENOUS
Status: DISCONTINUED | OUTPATIENT
Start: 2025-03-10 | End: 2025-03-10 | Stop reason: HOSPADM

## 2025-03-10 RX ORDER — FENTANYL CITRATE 50 UG/ML
50 INJECTION, SOLUTION INTRAMUSCULAR; INTRAVENOUS EVERY 5 MIN PRN
Status: DISCONTINUED | OUTPATIENT
Start: 2025-03-10 | End: 2025-03-10 | Stop reason: HOSPADM

## 2025-03-10 RX ORDER — NALOXONE HYDROCHLORIDE 0.4 MG/ML
0.2 INJECTION, SOLUTION INTRAMUSCULAR; INTRAVENOUS; SUBCUTANEOUS
Status: DISCONTINUED | OUTPATIENT
Start: 2025-03-10 | End: 2025-03-10 | Stop reason: HOSPADM

## 2025-03-10 RX ORDER — ONDANSETRON 2 MG/ML
INJECTION INTRAMUSCULAR; INTRAVENOUS PRN
Status: DISCONTINUED | OUTPATIENT
Start: 2025-03-10 | End: 2025-03-10

## 2025-03-10 RX ORDER — LIDOCAINE 40 MG/G
CREAM TOPICAL
Status: DISCONTINUED | OUTPATIENT
Start: 2025-03-10 | End: 2025-03-10 | Stop reason: HOSPADM

## 2025-03-10 RX ORDER — NALOXONE HYDROCHLORIDE 0.4 MG/ML
0.4 INJECTION, SOLUTION INTRAMUSCULAR; INTRAVENOUS; SUBCUTANEOUS
Status: DISCONTINUED | OUTPATIENT
Start: 2025-03-10 | End: 2025-03-10 | Stop reason: HOSPADM

## 2025-03-10 RX ORDER — SODIUM CHLORIDE, SODIUM LACTATE, POTASSIUM CHLORIDE, CALCIUM CHLORIDE 600; 310; 30; 20 MG/100ML; MG/100ML; MG/100ML; MG/100ML
INJECTION, SOLUTION INTRAVENOUS CONTINUOUS
Status: DISCONTINUED | OUTPATIENT
Start: 2025-03-10 | End: 2025-03-10 | Stop reason: HOSPADM

## 2025-03-10 RX ORDER — AMIODARONE HYDROCHLORIDE 200 MG/1
100 TABLET ORAL DAILY
Qty: 45 TABLET | Refills: 0 | Status: SHIPPED | OUTPATIENT
Start: 2025-03-10

## 2025-03-10 RX ORDER — FENTANYL CITRATE 50 UG/ML
25 INJECTION, SOLUTION INTRAMUSCULAR; INTRAVENOUS EVERY 5 MIN PRN
Status: DISCONTINUED | OUTPATIENT
Start: 2025-03-10 | End: 2025-03-10 | Stop reason: HOSPADM

## 2025-03-10 RX ORDER — DEXAMETHASONE SODIUM PHOSPHATE 4 MG/ML
INJECTION, SOLUTION INTRA-ARTICULAR; INTRALESIONAL; INTRAMUSCULAR; INTRAVENOUS; SOFT TISSUE PRN
Status: DISCONTINUED | OUTPATIENT
Start: 2025-03-10 | End: 2025-03-10

## 2025-03-10 RX ORDER — FENTANYL CITRATE 50 UG/ML
INJECTION, SOLUTION INTRAMUSCULAR; INTRAVENOUS PRN
Status: DISCONTINUED | OUTPATIENT
Start: 2025-03-10 | End: 2025-03-10

## 2025-03-10 RX ORDER — OXYCODONE AND ACETAMINOPHEN 5; 325 MG/1; MG/1
1 TABLET ORAL EVERY 4 HOURS PRN
Status: DISCONTINUED | OUTPATIENT
Start: 2025-03-10 | End: 2025-03-10 | Stop reason: HOSPADM

## 2025-03-10 RX ORDER — PROPOFOL 10 MG/ML
INJECTION, EMULSION INTRAVENOUS PRN
Status: DISCONTINUED | OUTPATIENT
Start: 2025-03-10 | End: 2025-03-10

## 2025-03-10 RX ORDER — ONDANSETRON 4 MG/1
4 TABLET, ORALLY DISINTEGRATING ORAL EVERY 30 MIN PRN
Status: DISCONTINUED | OUTPATIENT
Start: 2025-03-10 | End: 2025-03-10 | Stop reason: HOSPADM

## 2025-03-10 RX ADMIN — PHENYLEPHRINE HYDROCHLORIDE 100 MCG: 10 INJECTION INTRAVENOUS at 09:43

## 2025-03-10 RX ADMIN — ROCURONIUM BROMIDE 10 MG: 50 INJECTION, SOLUTION INTRAVENOUS at 08:58

## 2025-03-10 RX ADMIN — SODIUM CHLORIDE, POTASSIUM CHLORIDE, SODIUM LACTATE AND CALCIUM CHLORIDE: 600; 310; 30; 20 INJECTION, SOLUTION INTRAVENOUS at 09:56

## 2025-03-10 RX ADMIN — PHENYLEPHRINE HYDROCHLORIDE 100 MCG: 10 INJECTION INTRAVENOUS at 08:59

## 2025-03-10 RX ADMIN — SUGAMMADEX 200 MG: 100 INJECTION, SOLUTION INTRAVENOUS at 09:52

## 2025-03-10 RX ADMIN — PHENYLEPHRINE HYDROCHLORIDE 200 MCG: 10 INJECTION INTRAVENOUS at 09:36

## 2025-03-10 RX ADMIN — PHENYLEPHRINE HYDROCHLORIDE 100 MCG: 10 INJECTION INTRAVENOUS at 08:58

## 2025-03-10 RX ADMIN — PHENYLEPHRINE HYDROCHLORIDE 200 MCG: 10 INJECTION INTRAVENOUS at 09:35

## 2025-03-10 RX ADMIN — PHENYLEPHRINE HYDROCHLORIDE 0.25 MCG/KG/MIN: 10 INJECTION INTRAVENOUS at 08:52

## 2025-03-10 RX ADMIN — DEXAMETHASONE SODIUM PHOSPHATE 4 MG: 4 INJECTION, SOLUTION INTRA-ARTICULAR; INTRALESIONAL; INTRAMUSCULAR; INTRAVENOUS; SOFT TISSUE at 09:14

## 2025-03-10 RX ADMIN — PROPOFOL 150 MG: 10 INJECTION, EMULSION INTRAVENOUS at 08:30

## 2025-03-10 RX ADMIN — ROCURONIUM BROMIDE 10 MG: 50 INJECTION, SOLUTION INTRAVENOUS at 08:45

## 2025-03-10 RX ADMIN — LIDOCAINE HYDROCHLORIDE 100 MG: 20 INJECTION, SOLUTION INFILTRATION; PERINEURAL at 08:30

## 2025-03-10 RX ADMIN — ONDANSETRON 4 MG: 2 INJECTION INTRAMUSCULAR; INTRAVENOUS at 09:50

## 2025-03-10 RX ADMIN — ROCURONIUM BROMIDE 40 MG: 50 INJECTION, SOLUTION INTRAVENOUS at 08:31

## 2025-03-10 RX ADMIN — SODIUM CHLORIDE, POTASSIUM CHLORIDE, SODIUM LACTATE AND CALCIUM CHLORIDE: 600; 310; 30; 20 INJECTION, SOLUTION INTRAVENOUS at 07:30

## 2025-03-10 RX ADMIN — PHENYLEPHRINE HYDROCHLORIDE 100 MCG: 10 INJECTION INTRAVENOUS at 08:54

## 2025-03-10 RX ADMIN — PHENYLEPHRINE HYDROCHLORIDE 100 MCG: 10 INJECTION INTRAVENOUS at 09:37

## 2025-03-10 RX ADMIN — PHENYLEPHRINE HYDROCHLORIDE 100 MCG: 10 INJECTION INTRAVENOUS at 09:17

## 2025-03-10 RX ADMIN — ROCURONIUM BROMIDE 10 MG: 50 INJECTION, SOLUTION INTRAVENOUS at 09:15

## 2025-03-10 ASSESSMENT — ACTIVITIES OF DAILY LIVING (ADL)
ADLS_ACUITY_SCORE: 48

## 2025-03-10 ASSESSMENT — ENCOUNTER SYMPTOMS
SEIZURES: 0
ORTHOPNEA: 0

## 2025-03-10 NOTE — ANESTHESIA POSTPROCEDURE EVALUATION
Patient: Keith Kevin    Procedure: Procedure(s):  Ablation Atrial Fibrilation       Anesthesia Type:  General    Note:     Postop Pain Control: Uneventful            Sign Out: Well controlled pain   PONV: No   Neuro/Psych: Uneventful            Sign Out: Acceptable/Baseline neuro status   Airway/Respiratory: Uneventful            Sign Out: Acceptable/Baseline resp. status   CV/Hemodynamics: Uneventful            Sign Out: Acceptable CV status; No obvious hypovolemia; No obvious fluid overload   Other NRE: NONE   DID A NON-ROUTINE EVENT OCCUR? No           Last vitals:  Vitals Value Taken Time   /99 03/10/25 1100   Temp 36  C (96.8  F) 03/10/25 1045   Pulse 50 03/10/25 1107   Resp 8 03/10/25 1107   SpO2 96 % 03/10/25 1108   Vitals shown include unfiled device data.    Electronically Signed By: Renzo Burch MD  March 10, 2025  5:02 PM

## 2025-03-10 NOTE — PROGRESS NOTES
"Care Suites Post Procedure Note    Patient Information  Name: Keith Kevin  Age: 74 year old    Post Procedure  Time patient returned to Care Suites: 1115  Concerns/abnormal assessment: 2/10 chest \"heaviness\", EKG done in PACU, heaviness persists, per pt this was a sensation he would have on and off pre-procedure as well. Dr. Garcia aware.   If abnormal assessment, provider notified: Yes - dr garcia aware - no changes to current plan  Plan/Other: 4 hours bedrest, remove stop cock at end of bedrest and ambulate    Katie Hester RN     "

## 2025-03-10 NOTE — Clinical Note
Arrhythmia Type: atrial fibrillation.   Method of Cardioversion: synchronous.   The arrhythmia was terminated.   Energy shock delivered: 150 joules.   Time shock delivered: 09:39 CDT.   Post cardioversion rhythm: sinus rhythm.   No early return to atrial fibrillation (ERAF). No immediate return to atrial fibrillation (IRAF).

## 2025-03-10 NOTE — PROGRESS NOTES
Care Suites Admission Nursing Note    Patient Information  Name: Keith Kevin  Age: 74 year old  Reason for admission: Atrial Fib Ablation   Care Suites arrival time: 0635    Visitor Information  Name: Suly     Patient Admission/Assessment   Pre-procedure assessment complete: Yes  If abnormal assessment/labs, provider notified: N/A  NPO: Yes  Medications held per instructions/orders: N/A  Consent: deferred  If applicable, pregnancy test status: deferred  Patient oriented to room: Yes  Education/questions answered: Yes  Plan/other:   Proceed with plan     Discharge Planning  Discharge name/phone number: [facility]  [phone]  Overnight post sedation caregiver: Suly  Discharge location: home    Keith Garcia RN

## 2025-03-10 NOTE — ANESTHESIA PROCEDURE NOTES
Airway       Patient location: [facility] - Operating Room or Procedural Area.       Procedure Start/Stop Times: 3/10/2025 8:32 AM and 3/10/2025 8:32 AM  Staff -        CRNA: Mat Hagen APRN CRNA       Performed By: CRNAIndications and Patient Condition       Indications for airway management: reggie-procedural and airway protection       Induction type:intravenous       Mask difficulty assessment: 3 - difficult mask (inadequate, unstable, or two providers) +/- NMBA (Beard)    Final Airway Details       Final airway type: endotracheal airway       Successful airway: ETT - single and Oral  Endotracheal Airway Details        ETT size (mm): 8.0       Cuffed: yes       Successful intubation technique: video laryngoscopy       VL Blade Size: Wheeler 4       Grade View of Cords: 1       Adjucts: stylet       Position: Center       Measured from: gums/teeth       Secured at (cm): 24       Bite block used: None    Post intubation assessment        Placement verified by: capnometry, equal breath sounds and chest rise        Number of attempts at approach: 1       Number of other approaches attempted: 0       Secured with: tape       Ease of procedure: easy       Dentition: Intact and Unchanged    Medication(s) Administered   Medication Administration Time: 3/10/2025 8:32 AM

## 2025-03-10 NOTE — ANESTHESIA CARE TRANSFER NOTE
Patient: Keith Kevin    Procedure: Procedure(s):  Ablation Atrial Fibrilation       Diagnosis: persistent atrial fibrillation  Diagnosis Additional Information: No value filed.    Anesthesia Type:   General     Note:    Oropharynx: oropharynx clear of all foreign objects and spontaneously breathing  Level of Consciousness: awake  Oxygen Supplementation: face mask  Level of Supplemental Oxygen (L/min / FiO2): 6  Independent Airway: airway patency satisfactory and stable  Dentition: dentition unchanged  Vital Signs Stable: post-procedure vital signs reviewed and stable  Report to RN Given: handoff report given  Patient transferred to: PACU  Comments:   Neuromuscular blockade reversed with sugammadex to TOF ratio equal or greater than 90% (measured by TwitchView at the adductor pollicis), spontaneous respirations, adequate tidal volumes, followed commands to voice, oropharynx suctioned with soft flexible catheter, extubated atraumatically, extubated with suction, airway patent after extubation.  Oxygen via facemask at 6 liters per minute to PACU. Oxygen tubing connected to wall O2 in PACU, SpO2, NiBP, and EKG monitors and alarms on and functioning, report on patient's clinical status given to PACU RN, RN questions answered.             Handoff Report: Identifed the Patient, Identified the Reponsible Provider, Reviewed the pertinent medical history, Discussed the surgical course, Reviewed Intra-OP anesthesia mangement and issues during anesthesia, Set expectations for post-procedure period and Allowed opportunity for questions and acknowledgement of understanding  Vitals:  Vitals Value Taken Time   /80 03/10/25 1006   Temp     Pulse 50 03/10/25 1008   Resp 13 03/10/25 1008   SpO2 100 % 03/10/25 1008   Vitals shown include unfiled device data.    Electronically Signed By: CLYDE Pinedo CRNA  March 10, 2025  10:09 AM

## 2025-03-10 NOTE — Clinical Note
Addended by: KAY COVINGTON on: 8/31/2021 10:19 AM     Modules accepted: Orders     Arrhythmia Type: atrial fibrillation.   Method of Cardioversion: synchronous.   The arrhythmia was terminated.   Energy shock delivered: 150 joules.   Time shock delivered: 09:05 CDT.   Post cardioversion rhythm: sinus rhythm.   No early return to atrial fibrillation (ERAF). No immediate return to atrial fibrillation (IRAF).

## 2025-03-10 NOTE — DISCHARGE INSTRUCTIONS
A Fib Ablation Discharge Instructions    After you go home:  Have an adult stay with you until tomorrow.  You may eat your normal diet, unless your doctor tells you otherwise.  RELAX and take it easy for 3 days.        For 24-48 hours (due to the sedation you received):  DO NOT DRIVE FOR 2 DAYS!   Do NOT make any important or legal decisions.  Do NOT drive or operate machines at home or at work.  Do NOT drink alcohol.    Care of Puncture Site:  Check the puncture site severy 1-2 hours while awake.  For 2-3 days, when you cough, sneeze, laugh or move your bowels, hold your hand over the puncture sites and press firmly.  Please remove Dressing after 24 hours, works best to take off in shower.  Then apply a band aid daily for at least 3 days (if needed). If there is minor oozing, apply another band aid and remove it after 12 hours.   It is normal to have a bruising or a small lump that is present under the skin . This will go away on its own after 3-4 weeks.   You may shower. Do NOT take a bath, or use a hot tub or pool until groin site heals, which may take up to a week.  Do NOT scrub the site. Do NOT use lotion or powder near the puncture site.    Activity:  NO bending, stooping over or squatting  for 3 days  Do NOT lift, push or pull more than 10 pounds (equal to a gallon of milk) for 3 days.  NO repetitive motions such as loading , vacuuming, raking, shoveling,   Limit going up and down the stairs repetitively, for the first 24 hours after procedure.     Bleeding:  If you start bleeding from the groin site, lie down flat and press firmly on the site for 10 minutes or until bleeding stops.   Once bleeding stops, lay flat for 1-2 hours.  Call your A Fib nurse if bleeding does not stop or after hours will need to go to ER.       Go to ER or Call 911 right away if you have heavy bleeding or bleeding that does not stop.    Medications:  Take your medications, including blood thinners, unless your doctor tells  you not to.  If you have stopped any other medicines, check with your nurse or provider about when to restart them.  If you have PAIN or a TIGHTNESS in your chest, you MAY take Tylenol (acetaminophen) and if this does not help, you MAY take Advil (ibuprofen-400 mg with food).  If you have constipation or prone to constipation,  take a fiber supplement, ie metamucil or stool softners.    Call the A Fib RN if:  Chest pain not relieved by acetaminophen or ibuprofen  Difficulty swallowing and/or coughing up blood  Shortness of breath  Increased groin pain or a large or growing hard lump around the site.  Groin site is red, swollen, hot or tender.  Blood or fluid is draining from the groin site.  You have chills or a fever greater than 101 F (38 C).  Your leg feels numb, cool or changes color.  If groin pain is not relieved by Tylenol or Ibuprofen.  Recurrent irregular or fast heart rate lasting over 2-3 hours.  Any questions or concerns.    Heart rhythms:  You may have some irregular heartbeats. These feel very strong. They may make you feel that the A Fib is going to start again.  Give it time. The irregular beats should occur less often.    Follow Up Appointments:  Elise Rao on 3/19/25 at 2:00 with an EKG  [place]  Dr Gutiérrez 6/12/25 on 10:45 with an EKG  [facility]   A Fib clinic RN's Suly Mccall Kathy, Madalyn  [phone] (Mon-Fri, 8:00-4:30)   [phone] Option 2 (7 days a week) after hours for on call Cardiologist.

## 2025-03-10 NOTE — PROGRESS NOTES
Care Suites Discharge Nursing Note    Patient Information  Name: Keith Kevin  Age: 74 year old    Discharge Education:  Discharge instructions reviewed: Yes  Additional education/resources provided: none  Patient/patient representative verbalizes understanding: Yes  Patient discharging on new medications: Yes  Medication education completed: Yes    Discharge Plans:   Discharge location: home  Discharge ride contacted: Yes  Approximate discharge time: 1445    Discharge Criteria:  Discharge criteria met and vital signs stable: Yes  Right groin CDI with gauze and tegaderm after loosen stopcock and removed suture.  No bleeding no hematoma noted   CMS intact to right foot  Patient able to ambulate to bathroom, eating, and drinking without difficulty     Patient Belongs:  Patient belongings returned to patient: Yes    Keith Garcia RN

## 2025-03-10 NOTE — PROGRESS NOTES
Uneventful isolation of PVs and posterior LA. Empiric ablation of CTI  Remove suture from right groin after 4 hours of bedrest  Stop toprol  Amio 100 mg daily

## 2025-03-11 ENCOUNTER — TELEPHONE (OUTPATIENT)
Dept: CARDIOLOGY | Facility: CLINIC | Age: 75
End: 2025-03-11
Payer: COMMERCIAL

## 2025-03-11 NOTE — TELEPHONE ENCOUNTER
[address], APRN-[facility]  [address]  Phone:  [phone]  Fax:  [phone]  James Mayfield is a 79 Lexa Trujillo    Brain metastasis [facility])     Brain tumor (benign) ([address] 08/09/2021    sees [facility]     CAD (coronary artery disease)     Chronic back pain     Chronic right hip pain     Colon polyp     GERD (gastroesophageal reflux disease)     Gout     [place]'s disease 2021    Dr Cordell Ambrocio    Hyperlipidemia     Hypertension     Intraparenchymal hemorrhage of brain ([address]     Lumbar postlaminectomy syndrome 01/09/2017    Vilma Christensen Spoke to pt who is doing well. States that he had a headache yesterday, but did not last long. Pt has been taking Ibuprofen and tylenol with some chest pain and that is helping. Pt states that he can take a deep breath with no issues.  Pt Groin is still covered at this time and will be removed later today. Pt has no pain in his groin site and took to 1.5 mile slow walks today. Pt reminded that he is to be taking it easy to allow the groin to heal. Pt has 3 flights of stairs, but has been limiting his going up and down.  Pt is urinating good and Bowels are slow to wake up, but has passed some gas and did take a stool softer this morning. Pt to stay on top of his bowels.  Pt taking his Amiodarone and Metoprolol was stopped, pt notes BP is slightly up and asked the pt continue to monitor as it may go up without the Metoprolol. Pt very happy with waiting for he PFA and states the his SOB is better and he feels better.  Pt will call with any issues. Yi    [facility]    Macular edema 2017    Dr Kiser Files    THAIS (obstructive sleep apnea)     Osteoarthritis     Presence of right artificial knee joint 2004    Dr Favio Blackburn [facility])     Pulmonary embolism ([address] Eloy Soto MD at [address]  2005   [address]  Miracle Kim MD at [facility] [address] [place] [address] Right 09/22/2017    Right SACROILIAC & Piriformis Inj's performed by Eloy Soto MD at [address] Right 10/03/2017    Right SACROILIAC & Piriformis Inj's performed by Eloy Soto MD at [address] [facility] N/A 12/08/2017    L2  KYPHOPLASTY W/ BONE BX & Radiofrequency lesions performed by Eloy Soto MD at [address]  2005    direct   [address]  [address] [facility] 5180-7806 Healthwise, [employer]. Care instructions adapted under license by [facility] ([facility]). MELISSA Erazo NP on12/2/2021

## 2025-03-16 ENCOUNTER — MYC MEDICAL ADVICE (OUTPATIENT)
Dept: CARDIOLOGY | Facility: CLINIC | Age: 75
End: 2025-03-16
Payer: COMMERCIAL

## 2025-03-16 DIAGNOSIS — I48.0 PAROXYSMAL ATRIAL FIBRILLATION (H): ICD-10-CM

## 2025-03-17 RX ORDER — AMIODARONE HYDROCHLORIDE 200 MG/1
TABLET ORAL
Qty: 90 TABLET | Refills: 1 | Status: SHIPPED | OUTPATIENT
Start: 2025-03-17

## 2025-03-19 ENCOUNTER — OFFICE VISIT (OUTPATIENT)
Dept: CARDIOLOGY | Facility: CLINIC | Age: 75
End: 2025-03-19
Payer: COMMERCIAL

## 2025-03-19 ENCOUNTER — HOSPITAL ENCOUNTER (OUTPATIENT)
Facility: CLINIC | Age: 75
End: 2025-03-19
Payer: COMMERCIAL

## 2025-03-19 ENCOUNTER — HOSPITAL ENCOUNTER (OUTPATIENT)
Facility: CLINIC | Age: 75
End: 2025-03-19
Admitting: INTERNAL MEDICINE
Payer: COMMERCIAL

## 2025-03-19 VITALS
OXYGEN SATURATION: 99 % | HEART RATE: 79 BPM | WEIGHT: 187.2 LBS | HEIGHT: 70 IN | DIASTOLIC BLOOD PRESSURE: 58 MMHG | SYSTOLIC BLOOD PRESSURE: 132 MMHG | BODY MASS INDEX: 26.8 KG/M2

## 2025-03-19 DIAGNOSIS — I48.19 PERSISTENT ATRIAL FIBRILLATION (H): ICD-10-CM

## 2025-03-19 DIAGNOSIS — R00.2 PALPITATIONS: Primary | ICD-10-CM

## 2025-03-19 NOTE — LETTER
3/19/2025    Abram Villanueva MD  [address] [place] [address]    RE: Keith Kevin       Dear Colleague,     I had the pleasure of seeing Keith Kevin in the [facility].  EP Cardiology Clinic Progress Note  Keith Kevin MRN# [id number]   YOB: 1950 Age: 74 year old   Primary Cardiologist: Dr. Gutiérrez  Reason for visit: EP follow-up              Assessment and Plan:   Keith Kevin is a very pleasant 74 year old male who is here today for EP follow-up     1.  Persistent atrial fibrillation, initially managed with flecainide.  Sotalol trialed later with recurrent atrial fibrillation after cardioversion.  Now status post PVI, LA posterior wall isolation, and CTI via PFA 3/10/2025.  Started on low-dose amiodarone, 100 mg once daily postprocedure.  Developed recurrent persistent atrial fibrillation 6 days postprocedure.  Remains in rate controlled atrial fibrillation today.  On Eliquis 5 mg twice daily for cardioembolic risk reduction in the setting of elevated [id number] score of 3 (age x 2, hypertension).  No missed doses in the last 1+ month.  2.  Typical atrial flutter status post CTI 3/10/2025.  3.  Hyperlipidemia, on rosuvastatin  4.  Hypertension, well-controlled    Changes today: Increase amiodarone to 300 mg twice daily x 2 weeks, then 200 mg once daily as previously recommended by Dr. Gutiérrez.    Cardioversion in 2 weeks if Harshil remains in persistent atrial fibrillation.    The risks and benefits of direct current cardioversion were reviewed with the patient including but not limited to the inherent risks of anesthesia, skin irritation, the development of profound bradycardia, and stroke. Patient verbalized understanding and wishes to proceed.    EP follow-up postprocedure.    SHONNA Dominguez [facility]  Pager: [phone]          History of Presenting Illness:    Keith Kevin is a very pleasant 74 year old male with a  history of persistent atrial fibrillation, typical atrial flutter, hypertension and hyperlipidemia.  Patient is a retired pharmacist.    Patient was initially diagnosed with atrial fibrillation during a pre-colonoscopy evaluation in 2021.  He was then seen by Dr. Dubon who initiated flecainide given recurrent atrial fibrillation with symptoms.  He also has a hx of typical atrial flutter as documented on ECG 10/2023.    Harshil underwent a nuclear stress test 11/2023 that showed a rate related left bundle branch block with a questionable perfusion defect.  Flecainide was subsequently discontinued.  In March 2024, he saw Dr. Gutiérrez to discuss options for atrial fibrillation management moving forward.  Given minimal symptoms with atrial fibrillation, a rate control strategy was adopted.  Since that visit, patient remained in persistent atrial fibrillation and developed fatigue and malaise.  He underwent cardioversion 9/2024 with subsequent ERAF.      Most recent echocardiogram was completed 10/2024 and revealed LVEF 50 to 55%, mild MR, mild pulmonary hypertension, mild to moderate AI, and mild to moderate pulmonic valvular regurgitation.    Again, discussions were had regarding potential management options.  Patient ultimately elected to proceed with electrophysiology study and catheter ablation of his atrial fibrillation and typical atrial flutter.  This took place 3/10/2025 at which time patient underwent uneventful wide circumferential PVI, LA posterior wall isolation, and CTI using PFA.  He tolerated the procedure well without apparent complications.  He was started on amiodarone 100 mg once daily at time of discharge.    Patient contacted the clinic 6 days postprocedure reporting recurrent atrial fibrillation with controlled ventricular rates.  He was advised to increase his amiodarone to 300 mg twice daily x 2 weeks, then 200 mg daily,  with plan for cardioversion in 1 to 2 weeks if AFIB remained persistent.    Patient  "presents to clinic today for routine postprocedure follow-up.  Patient has overall been feeling well since his procedure.  He is  \"bummed\" he is back in atrial fibrillation so soon after the procedure.  He was concerned about taking so much amiodarone and has not increased the dose as recommended a few days ago.  Denies chest pain, palpitations, lightheadedness, dizziness, near-syncope or syncope.  No shortness of breath, orthopnea or PND.     No missed doses of Eliquis in the last 1+ month.    Blood pressure 132/58 and heart rate 79 in clinic today.  ECG completed in clinic today reveals atrial fibrillation, VR 71, QRS 90.        Social History       Social History     Socioeconomic History     Marital status:      Spouse name: Mary     Number of children: 2     Years of education: Not on file     Highest education level: Not on file   Occupational History     Employer: RETIRED     Comment: Worked as a pharmacist   Tobacco Use     Smoking status: Never     Passive exposure: Never     Smokeless tobacco: Never   Vaping Use     Vaping status: Never Used   Substance and Sexual Activity     Alcohol use: Yes     Comment: 1 beer weekly     Drug use: No     Sexual activity: Yes     Partners: Female     Birth control/protection: None   Other Topics Concern      Service Not Asked     Blood Transfusions Not Asked     Caffeine Concern Not Asked     Occupational Exposure Not Asked     Hobby Hazards Not Asked     Sleep Concern Not Asked     Stress Concern Not Asked     Weight Concern Not Asked     Special Diet Not Asked     Back Care No     Exercise Yes     Comment: daily walk and ride bike     Bike Helmet Yes     Seat Belt Yes     Self-Exams Not Asked     Parent/sibling w/ CABG, MI or angioplasty before 65F 55M? No   Social History Narrative     Not on file     Social Drivers of Health     Financial Resource Strain: Low Risk  (4/19/2024)    Financial Resource Strain      Within the past 12 months, have you or " your family members you live with been unable to get utilities (heat, electricity) when it was really needed?: No   Food Insecurity: Low Risk  (4/19/2024)    Food Insecurity      Within the past 12 months, did you worry that your food would run out before you got money to buy more?: No      Within the past 12 months, did the food you bought just not last and you didn t have money to get more?: No   Transportation Needs: Low Risk  (4/19/2024)    Transportation Needs      Within the past 12 months, has lack of transportation kept you from medical appointments, getting your medicines, non-medical meetings or appointments, work, or from getting things that you need?: No   Physical Activity: Sufficiently Active (4/19/2024)    Exercise Vital Sign      Days of Exercise per Week: 7 days      Minutes of Exercise per Session: 130 min   Stress: No Stress Concern Present (4/19/2024)    [employer] [place] [employer] - Occupational Stress Questionnaire      Feeling of Stress : Not at all   Social Connections: Unknown (4/19/2024)    Social Connection and Isolation Panel [NHANES]      Frequency of Communication with Friends and Family: Not on file      Frequency of Social Gatherings with Friends and Family: More than three times a week      Attends Jain Services: Not on file      Active Member of Clubs or Organizations: Not on file      Attends Club or Organization Meetings: Not on file      Marital Status: Not on file   Interpersonal Safety: Low Risk  (3/10/2025)    Interpersonal Safety      Do you feel physically and emotionally safe where you currently live?: Yes      Within the past 12 months, have you been hit, slapped, kicked or otherwise physically hurt by someone?: No      Within the past 12 months, have you been humiliated or emotionally abused in other ways by your partner or ex-partner?: No   Housing Stability: Low Risk  (4/19/2024)    Housing Stability      Do you have housing? : Yes      Are you worried  "about losing your housing?: No            Review of Systems:   Please see HPI         Physical Exam:   Vitals: /58 (BP Location: Right arm, Patient Position: Sitting, Cuff Size: Adult Regular)   Pulse 79   Ht 1.778 m (5' 10\")   Wt 84.9 kg (187 lb 3.2 oz)   SpO2 99%   BMI 26.86 kg/m     Wt Readings from Last 4 Encounters:   03/19/25 84.9 kg (187 lb 3.2 oz)   03/10/25 83.9 kg (185 lb)   02/07/25 83.9 kg (185 lb)   10/23/24 81.6 kg (180 lb)     GEN: well nourished, in no acute distress.  HEENT:  Pupils equal, round. Sclerae nonicteric.   NECK: Supple, no masses appreciated. No JVD  C/V: Irregularly irregular rhythm, controlled rates.  No murmur appreciated  RESP: Respirations are unlabored. Clear to auscultation bilaterally without wheezing, rales, or rhonchi.  GI: Abdomen soft, nontender.  EXTREM: no LE edema.  NEURO: Alert and oriented, cooperative.  SKIN: Warm and dry.        Data:   LIPID RESULTS:  Lab Results   Component Value Date    CHOL 158 04/22/2024    CHOL 229 (H) 02/28/2020    HDL 62 04/22/2024    HDL 62 02/28/2020    LDL 83 04/22/2024     (H) 02/28/2020    TRIG 67 04/22/2024    TRIG 74 02/28/2020    CHOLHDLRATIO 3.0 01/16/2015     LIVER ENZYME RESULTS:  Lab Results   Component Value Date    AST 22 04/22/2024    AST 32 02/08/2010    ALT 18 04/22/2024    ALT 18 02/08/2010     CBC RESULTS:  Lab Results   Component Value Date    WBC 5.6 03/10/2025    WBC 7.1 07/08/2021    RBC 4.68 03/10/2025    RBC 4.80 07/08/2021    HGB 15.1 03/10/2025    HGB 15.7 07/08/2021    HCT 44.2 03/10/2025    HCT 46.0 07/08/2021    MCV 94 03/10/2025    MCV 96 07/08/2021    MCH 32.3 03/10/2025    MCH 32.7 07/08/2021    MCHC 34.2 03/10/2025    MCHC 34.1 07/08/2021    RDW 12.9 03/10/2025    RDW 12.9 07/08/2021     03/10/2025     07/08/2021     BMP RESULTS:  Lab Results   Component Value Date     03/10/2025     07/08/2021    POTASSIUM 4.1 03/10/2025    POTASSIUM 4.0 07/08/2021    CHLORIDE 108 " "(H) 03/10/2025    CHLORIDE 108 07/08/2021    CO2 25 03/10/2025    CO2 22 07/08/2021    ANIONGAP 9 03/10/2025    ANIONGAP 8 07/08/2021     (H) 03/10/2025     (H) 07/08/2021    BUN 17.9 03/10/2025    BUN 21 07/08/2021    CR 0.86 03/10/2025    CR 0.97 07/08/2021    GFRESTIMATED 90 03/10/2025    GFRESTIMATED 78 07/08/2021    GFRESTBLACK >90 07/08/2021    STEPHEN 9.0 03/10/2025    STEPHEN 9.1 07/08/2021      A1C RESULTS:  No results found for: \"A1C\"  INR RESULTS:  Lab Results   Component Value Date    INR 1.27 (H) 09/12/2024            Medications     Current Outpatient Medications   Medication Sig Dispense Refill     amiodarone (PACERONE) 200 MG tablet Take 300mg twice daily for 2 weeks, then 200mg daily 90 tablet 1     amLODIPine (NORVASC) 5 MG tablet Take 1 tablet (5 mg) by mouth daily. 90 tablet 3     apixaban ANTICOAGULANT (ELIQUIS ANTICOAGULANT) 5 MG tablet Take 1 tablet (5 mg) by mouth 2 times daily. 180 tablet 4     Calcium-Magnesium-Zinc 333-133-5 MG TABS per tablet Take 1 tablet by mouth daily.       Cholecalciferol (VITAMIN D) 1000 UNIT capsule Take 1 capsule by mouth daily. 100 capsule 12     clobetasol (TEMOVATE) 0.05 % external solution Apply topically twice a week. Apply to affected areas of scalp       clonazePAM (KLONOPIN) 0.5 MG tablet TAKE ONE TABLET BY MOUTH NIGHTLY AS NEEDED FOR REM BEHAVIOR DISORDER (Patient taking differently: Take 0.25 mg by mouth at bedtime.) 30 tablet 5     FISH OIL 1200 MG OR CAPS Take 1,200 mg by mouth daily.       GLUCOSAMINE 500 MG OR CAPS Take 500 mg by mouth daily.  0     ketoconazole (NIZORAL) 2 % external shampoo Apply topically 2 times daily. Wash affected areas on face, scalp & beard. Lather & let sit 5 minutes before rinsing.       melatonin 3-10 MG TABS Take by mouth at bedtime.       metroNIDAZOLE (METROCREAM) 0.75 % external cream Apply topically 2 times daily.       rosuvastatin (CRESTOR) 5 MG tablet Take 1 tablet (5 mg) by mouth daily 90 tablet 4     " sildenafil (VIAGRA) 100 MG tablet TAKE ONE TABLET BY MOUTH DAILY AS NEEDED FOR ERECTILE DYSFUNCTION. DO NOT TAKE WITH NITRO, TERAZOSIN, OR DOXAZOSIN 30 tablet 0          Past Medical History     Past Medical History:   Diagnosis Date     Aortic regurgitation     moderate     New onset atrial fibrillation (H)      Varicose veins of lower extremities with inflammation      Past Surgical History:   Procedure Laterality Date     ABDOMEN SURGERY  7/27/2020     ANESTHESIA CARDIOVERSION N/A 9/12/2024    Procedure: Anesthesia cardioversion;  Surgeon: GENERIC ANESTHESIA PROVIDER;  Location:  OR     APPENDECTOMY  Age 20     DAVINCI HERNIORRHAPHY INGUINAL Left 08/18/2020    Procedure: ROBOTIC ASSISTED LEFT INGUINAL HERNIA REPAIR WITH MESH;  Surgeon: Keshav Mehta MD;  Location: [facility] OR     EP ABLATION FOCAL AFIB N/A 3/10/2025    Procedure: Ablation Atrial Fibrilation;  Surgeon: Fred Gutiérrez MD;  Location:  HEART CARDIAC CATH LAB     LIGATE VEIN      Varicose veins, several times     TONSILLECTOMY & ADENOIDECTOMY  Approx age 10     [facility] LAP,HERNIA REPAIR PROC,UNLIST  2002     [facility] NONSPECIFIC PROCEDURE  2001    vein stripping     Family History   Problem Relation Age of Onset     Eye Disorder Mother         glaucoma     Diabetes Mother      Cancer Sister         breast Ca, diagnosed age 49     Cardiovascular Father         Abdominal aortic aneurysm     Obesity Father      Hypertension Brother      C.A.D. No family hx of      Cancer - colorectal No family hx of      Prostate Cancer No family hx of             Allergies   No known drug allergy    The longitudinal plan of care for the diagnosis(es)/condition(s) as documented were addressed during this visit. Due to the added complexity in care, I will continue to support Harshil in the subsequent management and with ongoing continuity of care.     35 minutes spent on the date of the encounter doing chart review, history and exam, documentation and further activities as noted  above    SHONNA Dominguez [facility]  Pager: [phone]      Thank you for allowing me to participate in the care of your patient.      Sincerely,     SHONNA Dominguez [facility]  cc:   No referring provider defined for this encounter.

## 2025-03-19 NOTE — PROGRESS NOTES
EP Cardiology Clinic Progress Note  Keith Kevin MRN# [id number]   YOB: 1950 Age: 74 year old   Primary Cardiologist: Dr. Gutiérrez  Reason for visit: EP follow-up              Assessment and Plan:   Keith Kevin is a very pleasant 74 year old male who is here today for EP follow-up     1.  Persistent atrial fibrillation, initially managed with flecainide.  Sotalol trialed later with recurrent atrial fibrillation after cardioversion.  Now status post PVI, LA posterior wall isolation, and CTI via PFA 3/10/2025.  Started on low-dose amiodarone, 100 mg once daily postprocedure.  Developed recurrent persistent atrial fibrillation 6 days postprocedure.  Remains in rate controlled atrial fibrillation today.  On Eliquis 5 mg twice daily for cardioembolic risk reduction in the setting of elevated [id number] score of 3 (age x 2, hypertension).  No missed doses in the last 1+ month.  2.  Typical atrial flutter status post CTI 3/10/2025.  3.  Hyperlipidemia, on rosuvastatin  4.  Hypertension, well-controlled    Changes today: Increase amiodarone to 300 mg twice daily x 2 weeks, then 200 mg once daily as previously recommended by Dr. Gutiérrez.    Cardioversion in 2 weeks if Harshil remains in persistent atrial fibrillation.    The risks and benefits of direct current cardioversion were reviewed with the patient including but not limited to the inherent risks of anesthesia, skin irritation, the development of profound bradycardia, and stroke. Patient verbalized understanding and wishes to proceed.    EP follow-up postprocedure.    Elise Rao PA-C  [facility]  Pager: [phone]          History of Presenting Illness:    Keith Kevin is a very pleasant 74 year old male with a history of persistent atrial fibrillation, typical atrial flutter, hypertension and hyperlipidemia.  Patient is a retired pharmacist.    Patient was initially diagnosed with atrial fibrillation during a  "pre-colonoscopy evaluation in 2021.  He was then seen by Dr. Dubon who initiated flecainide given recurrent atrial fibrillation with symptoms.  He also has a hx of typical atrial flutter as documented on ECG 10/2023.    Harshil underwent a nuclear stress test 11/2023 that showed a rate related left bundle branch block with a questionable perfusion defect.  Flecainide was subsequently discontinued.  In March 2024, he saw Dr. Gutiérrez to discuss options for atrial fibrillation management moving forward.  Given minimal symptoms with atrial fibrillation, a rate control strategy was adopted.  Since that visit, patient remained in persistent atrial fibrillation and developed fatigue and malaise.  He underwent cardioversion 9/2024 with subsequent ERAF.      Most recent echocardiogram was completed 10/2024 and revealed LVEF 50 to 55%, mild MR, mild pulmonary hypertension, mild to moderate AI, and mild to moderate pulmonic valvular regurgitation.    Again, discussions were had regarding potential management options.  Patient ultimately elected to proceed with electrophysiology study and catheter ablation of his atrial fibrillation and typical atrial flutter.  This took place 3/10/2025 at which time patient underwent uneventful wide circumferential PVI, LA posterior wall isolation, and CTI using PFA.  He tolerated the procedure well without apparent complications.  He was started on amiodarone 100 mg once daily at time of discharge.    Patient contacted the clinic 6 days postprocedure reporting recurrent atrial fibrillation with controlled ventricular rates.  He was advised to increase his amiodarone to 300 mg twice daily x 2 weeks, then 200 mg daily,  with plan for cardioversion in 1 to 2 weeks if AFIB remained persistent.    Patient presents to clinic today for routine postprocedure follow-up.  Patient has overall been feeling well since his procedure.  He is  \"bummed\" he is back in atrial fibrillation so soon after the procedure.  He " was concerned about taking so much amiodarone and has not increased the dose as recommended a few days ago.  Denies chest pain, palpitations, lightheadedness, dizziness, near-syncope or syncope.  No shortness of breath, orthopnea or PND.     No missed doses of Eliquis in the last 1+ month.    Blood pressure 132/58 and heart rate 79 in clinic today.  ECG completed in clinic today reveals atrial fibrillation, VR 71, QRS 90.        Social History       Social History     Socioeconomic History    Marital status:      Spouse name: Mary    Number of children: 2    Years of education: Not on file    Highest education level: Not on file   Occupational History     Employer: RETIRED     Comment: Worked as a pharmacist   Tobacco Use    Smoking status: Never     Passive exposure: Never    Smokeless tobacco: Never   Vaping Use    Vaping status: Never Used   Substance and Sexual Activity    Alcohol use: Yes     Comment: 1 beer weekly    Drug use: No    Sexual activity: Yes     Partners: Female     Birth control/protection: None   Other Topics Concern     Service Not Asked    Blood Transfusions Not Asked    Caffeine Concern Not Asked    Occupational Exposure Not Asked    Hobby Hazards Not Asked    Sleep Concern Not Asked    Stress Concern Not Asked    Weight Concern Not Asked    Special Diet Not Asked    Back Care No    Exercise Yes     Comment: daily walk and ride bike    Bike Helmet Yes    Seat Belt Yes    Self-Exams Not Asked    Parent/sibling w/ CABG, MI or angioplasty before 65F 55M? No   Social History Narrative    Not on file     Social Drivers of Health     Financial Resource Strain: Low Risk  (4/19/2024)    Financial Resource Strain     Within the past 12 months, have you or your family members you live with been unable to get utilities (heat, electricity) when it was really needed?: No   Food Insecurity: Low Risk  (4/19/2024)    Food Insecurity     Within the past 12 months, did you worry that your  "food would run out before you got money to buy more?: No     Within the past 12 months, did the food you bought just not last and you didn t have money to get more?: No   Transportation Needs: Low Risk  (4/19/2024)    Transportation Needs     Within the past 12 months, has lack of transportation kept you from medical appointments, getting your medicines, non-medical meetings or appointments, work, or from getting things that you need?: No   Physical Activity: Sufficiently Active (4/19/2024)    Exercise Vital Sign     Days of Exercise per Week: 7 days     Minutes of Exercise per Session: 130 min   Stress: No Stress Concern Present (4/19/2024)    [employer] - Occupational Stress Questionnaire     Feeling of Stress : Not at all   Social Connections: Unknown (4/19/2024)    Social Connection and Isolation Panel [NHANES]     Frequency of Communication with Friends and Family: Not on file     Frequency of Social Gatherings with Friends and Family: More than three times a week     Attends Rastafari Services: Not on file     Active Member of Clubs or Organizations: Not on file     Attends Club or Organization Meetings: Not on file     Marital Status: Not on file   Interpersonal Safety: Low Risk  (3/10/2025)    Interpersonal Safety     Do you feel physically and emotionally safe where you currently live?: Yes     Within the past 12 months, have you been hit, slapped, kicked or otherwise physically hurt by someone?: No     Within the past 12 months, have you been humiliated or emotionally abused in other ways by your partner or ex-partner?: No   Housing Stability: Low Risk  (4/19/2024)    Housing Stability     Do you have housing? : Yes     Are you worried about losing your housing?: No            Review of Systems:   Please see HPI         Physical Exam:   Vitals: /58 (BP Location: Right arm, Patient Position: Sitting, Cuff Size: Adult Regular)   Pulse 79   Ht 1.778 m (5' 10\")   Wt 84.9 " kg (187 lb 3.2 oz)   SpO2 99%   BMI 26.86 kg/m     Wt Readings from Last 4 Encounters:   03/19/25 84.9 kg (187 lb 3.2 oz)   03/10/25 83.9 kg (185 lb)   02/07/25 83.9 kg (185 lb)   10/23/24 81.6 kg (180 lb)     GEN: well nourished, in no acute distress.  HEENT:  Pupils equal, round. Sclerae nonicteric.   NECK: Supple, no masses appreciated. No JVD  C/V: Irregularly irregular rhythm, controlled rates.  No murmur appreciated  RESP: Respirations are unlabored. Clear to auscultation bilaterally without wheezing, rales, or rhonchi.  GI: Abdomen soft, nontender.  EXTREM: no LE edema.  NEURO: Alert and oriented, cooperative.  SKIN: Warm and dry.        Data:   LIPID RESULTS:  Lab Results   Component Value Date    CHOL 158 04/22/2024    CHOL 229 (H) 02/28/2020    HDL 62 04/22/2024    HDL 62 02/28/2020    LDL 83 04/22/2024     (H) 02/28/2020    TRIG 67 04/22/2024    TRIG 74 02/28/2020    CHOLHDLRATIO 3.0 01/16/2015     LIVER ENZYME RESULTS:  Lab Results   Component Value Date    AST 22 04/22/2024    AST 32 02/08/2010    ALT 18 04/22/2024    ALT 18 02/08/2010     CBC RESULTS:  Lab Results   Component Value Date    WBC 5.6 03/10/2025    WBC 7.1 07/08/2021    RBC 4.68 03/10/2025    RBC 4.80 07/08/2021    HGB 15.1 03/10/2025    HGB 15.7 07/08/2021    HCT 44.2 03/10/2025    HCT 46.0 07/08/2021    MCV 94 03/10/2025    MCV 96 07/08/2021    MCH 32.3 03/10/2025    MCH 32.7 07/08/2021    MCHC 34.2 03/10/2025    MCHC 34.1 07/08/2021    RDW 12.9 03/10/2025    RDW 12.9 07/08/2021     03/10/2025     07/08/2021     BMP RESULTS:  Lab Results   Component Value Date     03/10/2025     07/08/2021    POTASSIUM 4.1 03/10/2025    POTASSIUM 4.0 07/08/2021    CHLORIDE 108 (H) 03/10/2025    CHLORIDE 108 07/08/2021    CO2 25 03/10/2025    CO2 22 07/08/2021    ANIONGAP 9 03/10/2025    ANIONGAP 8 07/08/2021     (H) 03/10/2025     (H) 07/08/2021    BUN 17.9 03/10/2025    BUN 21 07/08/2021    CR 0.86  "03/10/2025    CR 0.97 07/08/2021    GFRESTIMATED 90 03/10/2025    GFRESTIMATED 78 07/08/2021    GFRESTBLACK >90 07/08/2021    STEPHEN 9.0 03/10/2025    STEPHEN 9.1 07/08/2021      A1C RESULTS:  No results found for: \"A1C\"  INR RESULTS:  Lab Results   Component Value Date    INR 1.27 (H) 09/12/2024            Medications     Current Outpatient Medications   Medication Sig Dispense Refill    amiodarone (PACERONE) 200 MG tablet Take 300mg twice daily for 2 weeks, then 200mg daily 90 tablet 1    amLODIPine (NORVASC) 5 MG tablet Take 1 tablet (5 mg) by mouth daily. 90 tablet 3    apixaban ANTICOAGULANT (ELIQUIS ANTICOAGULANT) 5 MG tablet Take 1 tablet (5 mg) by mouth 2 times daily. 180 tablet 4    Calcium-Magnesium-Zinc 333-133-5 MG TABS per tablet Take 1 tablet by mouth daily.      Cholecalciferol (VITAMIN D) 1000 UNIT capsule Take 1 capsule by mouth daily. 100 capsule 12    clobetasol (TEMOVATE) 0.05 % external solution Apply topically twice a week. Apply to affected areas of scalp      clonazePAM (KLONOPIN) 0.5 MG tablet TAKE ONE TABLET BY MOUTH NIGHTLY AS NEEDED FOR REM BEHAVIOR DISORDER (Patient taking differently: Take 0.25 mg by mouth at bedtime.) 30 tablet 5    FISH OIL 1200 MG OR CAPS Take 1,200 mg by mouth daily.      GLUCOSAMINE 500 MG OR CAPS Take 500 mg by mouth daily.  0    ketoconazole (NIZORAL) 2 % external shampoo Apply topically 2 times daily. Wash affected areas on face, scalp & beard. Lather & let sit 5 minutes before rinsing.      melatonin 3-10 MG TABS Take by mouth at bedtime.      metroNIDAZOLE (METROCREAM) 0.75 % external cream Apply topically 2 times daily.      rosuvastatin (CRESTOR) 5 MG tablet Take 1 tablet (5 mg) by mouth daily 90 tablet 4    sildenafil (VIAGRA) 100 MG tablet TAKE ONE TABLET BY MOUTH DAILY AS NEEDED FOR ERECTILE DYSFUNCTION. DO NOT TAKE WITH NITRO, TERAZOSIN, OR DOXAZOSIN 30 tablet 0          Past Medical History     Past Medical History:   Diagnosis Date    Aortic regurgitation     " moderate    New onset atrial fibrillation (H)     Varicose veins of lower extremities with inflammation      Past Surgical History:   Procedure Laterality Date    ABDOMEN SURGERY  7/27/2020    ANESTHESIA CARDIOVERSION N/A 9/12/2024    Procedure: Anesthesia cardioversion;  Surgeon: GENERIC ANESTHESIA PROVIDER;  Location:  OR    APPENDECTOMY  Age 20    DAVINCI HERNIORRHAPHY INGUINAL Left 08/18/2020    Procedure: ROBOTIC ASSISTED LEFT INGUINAL HERNIA REPAIR WITH MESH;  Surgeon: Keshav Mehta MD;  Location: [facility] OR    EP ABLATION FOCAL AFIB N/A 3/10/2025    Procedure: Ablation Atrial Fibrilation;  Surgeon: Fred Gutiérrez MD;  Location:  HE[facility] CATH LAB    LIGATE VEIN      Varicose veins, several times    TONSILLECTOMY & ADENOIDECTOMY  Approx age 10    Z LAP,HERNIA REPAIR PROC,UNLIST  2002    [facility] NONSPECIFIC PROCEDURE  2001    vein stripping     Family History   Problem Relation Age of Onset    Eye Disorder Mother         glaucoma    Diabetes Mother     Cancer Sister         breast Ca, diagnosed age 49    Cardiovascular Father         Abdominal aortic aneurysm    Obesity Father     Hypertension Brother     C.A.D. No family hx of     Cancer - colorectal No family hx of     Prostate Cancer No family hx of             Allergies   No known drug allergy    The longitudinal plan of care for the diagnosis(es)/condition(s) as documented were addressed during this visit. Due to the added complexity in care, I will continue to support Harshil in the subsequent management and with ongoing continuity of care.     35 minutes spent on the date of the encounter doing chart review, history and exam, documentation and further activities as noted above    SHONAN Dominguez [facility]  Pager: [phone]

## 2025-03-19 NOTE — PATIENT INSTRUCTIONS
Call the nurse for any questions or concerns at [phone].    Plan:  1. Medication changes:     Increase amiodarone to 300 mg twice daily x 2 weeks. On 4/3, decrease to 200 mg once daily and continue.     2. Cardioversion in ~2 weeks. Nothing to eat or drink after midnight the night prior to your procedure except sips of water with medications.     3. Follow-up post procedure   -Scheduling phone number: [phone]    It was great seeing you today!    Elise Rao PA-C  Physician Assistant  [facility]

## 2025-03-21 ENCOUNTER — HOSPITAL ENCOUNTER (OUTPATIENT)
Facility: CLINIC | Age: 75
End: 2025-03-21
Admitting: INTERNAL MEDICINE
Payer: COMMERCIAL

## 2025-03-24 ENCOUNTER — PATIENT OUTREACH (OUTPATIENT)
Dept: CARE COORDINATION | Facility: CLINIC | Age: 75
End: 2025-03-24
Payer: COMMERCIAL

## 2025-03-31 DIAGNOSIS — N52.9 ERECTILE DYSFUNCTION, UNSPECIFIED ERECTILE DYSFUNCTION TYPE: ICD-10-CM

## 2025-03-31 RX ORDER — SILDENAFIL 100 MG/1
TABLET, FILM COATED ORAL
Qty: 30 TABLET | Refills: 2 | Status: SHIPPED | OUTPATIENT
Start: 2025-03-31

## 2025-04-01 ENCOUNTER — TRANSFERRED RECORDS (OUTPATIENT)
Dept: HEALTH INFORMATION MANAGEMENT | Facility: CLINIC | Age: 75
End: 2025-04-01
Payer: COMMERCIAL

## 2025-04-02 ENCOUNTER — PATIENT OUTREACH (OUTPATIENT)
Dept: CARE COORDINATION | Facility: CLINIC | Age: 75
End: 2025-04-02
Payer: COMMERCIAL

## 2025-04-03 DIAGNOSIS — Z12.11 COLON CANCER SCREENING: ICD-10-CM

## 2025-04-07 ENCOUNTER — PATIENT OUTREACH (OUTPATIENT)
Dept: CARE COORDINATION | Facility: CLINIC | Age: 75
End: 2025-04-07
Payer: COMMERCIAL

## 2025-04-17 ENCOUNTER — ORDERS ONLY (AUTO-RELEASED) (OUTPATIENT)
Dept: URGENT CARE | Facility: CLINIC | Age: 75
End: 2025-04-17
Payer: COMMERCIAL

## 2025-04-17 DIAGNOSIS — Z12.11 COLON CANCER SCREENING: ICD-10-CM

## 2025-05-04 LAB — NONINV COLON CA DNA+OCC BLD SCRN STL QL: NEGATIVE

## 2025-06-12 ENCOUNTER — OFFICE VISIT (OUTPATIENT)
Dept: CARDIOLOGY | Facility: CLINIC | Age: 75
End: 2025-06-12
Payer: COMMERCIAL

## 2025-06-12 VITALS
SYSTOLIC BLOOD PRESSURE: 127 MMHG | HEIGHT: 70 IN | HEART RATE: 59 BPM | BODY MASS INDEX: 26.86 KG/M2 | OXYGEN SATURATION: 98 % | DIASTOLIC BLOOD PRESSURE: 59 MMHG | RESPIRATION RATE: 18 BRPM

## 2025-06-12 DIAGNOSIS — I48.0 PAROXYSMAL ATRIAL FIBRILLATION (H): Primary | ICD-10-CM

## 2025-06-12 RX ORDER — AMIODARONE HYDROCHLORIDE 200 MG/1
100 TABLET ORAL DAILY
Status: SHIPPED
Start: 2025-06-12

## 2025-06-12 NOTE — LETTER
6/12/2025    Abram Villanueva MD  [address] [place] [address]    RE: Keith Kevin       Dear Colleague,     I had the pleasure of seeing Keith Kevin in the [facility].    Electrophysiology Clinic Progress Note    Keith Kevin MRN# [id number]   YOB: 1950 Age: 74 year old     Primary cardiologist:          Assessment and Plan   Delightful 75 yo pt noted to be in AF during pre-colonoscopy eval in 2021. Saw my parter, Dr. Dubon at that time whom recommended Flecainide soon thereafter as pt did have AF recurrence with symptoms. He has been doing well without AF. He was noted to be in typical AFL in  October of '23  as documented on ECG. Subsequent nuc stress shows rate related LBBB with ?perfusion defect. Flecainide was discontinued.   I saw him in 3/24 to discuss options.  As his AF sxs were not that severe pt opted for rate control . Should sxs worsen in the future rhythm control may be considered with amio or ablation.     Since last visit has been in persistent AF with fatigue and malaise. He had ERAF after last cardioversion. Pt had thought about rhythm control options and decided for ablation which was performed in 3/10/25 with isolation of PVs and posterior LA with PFA. Had AF a week later requiring cardioversion. Amio was started. Since then has had a few more AF lasting for <1 hour.     Doing well otherwise, quite active w/o sob, or palpitations. ECG today shows sinus     Too early to gauge long term success. Will reduce amio to 100 mg daily for now and may consider stopping in a year if no AF. Check tsh/alt in 3 months.       The longitudinal plan of care of the diagnosis(es)/condition (s) as documented were addressed during this visit. Due to the added complexity in care, I will continue to support the patient in the subsequent management and with ongoing continuity of care.       Review of Systems     12-pt ROS is negative except for as noted in the  "HPI.          Physical Exam     Vitals: /59   Pulse 59   Resp 18   Ht 1.778 m (5' 10\")   SpO2 98%   BMI 26.86 kg/m    Wt Readings from Last 10 Encounters:   03/19/25 84.9 kg (187 lb 3.2 oz)   03/10/25 83.9 kg (185 lb)   02/07/25 83.9 kg (185 lb)   10/23/24 81.6 kg (180 lb)   10/09/24 83.9 kg (185 lb)   09/13/24 80.5 kg (177 lb 6.4 oz)   07/09/24 82.7 kg (182 lb 6.4 oz)   04/22/24 82.9 kg (182 lb 11.2 oz)   03/06/24 83.9 kg (185 lb)   11/29/23 83.9 kg (185 lb)       Constitutional:  Patient is pleasant, alert, cooperative, and in NAD.  HEENT:  NCAT. PERRLA. EOM's intact.   Neck:  CVP appears normal. No carotid bruits.   Pulmonary: Normal respiratory effort. CTAB.   Cardiac: RRR, normal S1/S2, no S3/S4, no murmur or rub.   Abdomen:  Non-tender abdomen, no hepatosplenomegaly appreciated.   Vascular: Pulses in the upper and lower extremities are 2+ and equal bilaterally.  Extremities: No edema, erythema, cyanosis or tenderness appreciated.  Skin:  No rashes or lesions appreciated.   Neurological:  No gross motor or sensory deficits.   Psych: Appropriate affect.          Data   Labs reviewed:  Recent Labs   Lab Test 04/22/24  0844 06/16/23  0917 04/19/23  1002 07/08/21  1402   LDL 83 82 157*  --    HDL 62 61 62  --    NHDL 96 93 170*  --    CHOL 158 154 232*  --    TRIG 67 54 67  --    TSH  --   --  1.45 1.04       Lab Results   Component Value Date    WBC 5.6 03/10/2025    WBC 7.1 07/08/2021    RBC 4.68 03/10/2025    RBC 4.80 07/08/2021    HGB 15.1 03/10/2025    HGB 15.7 07/08/2021    HCT 44.2 03/10/2025    HCT 46.0 07/08/2021    MCV 94 03/10/2025    MCV 96 07/08/2021    MCH 32.3 03/10/2025    MCH 32.7 07/08/2021    MCHC 34.2 03/10/2025    MCHC 34.1 07/08/2021    RDW 12.9 03/10/2025    RDW 12.9 07/08/2021     03/10/2025     07/08/2021       Lab Results   Component Value Date     03/10/2025     07/08/2021    POTASSIUM 4.1 03/10/2025    POTASSIUM 4.0 07/08/2021    CHLORIDE 108 (H) " "03/10/2025    CHLORIDE 108 07/08/2021    CO2 25 03/10/2025    CO2 22 07/08/2021    ANIONGAP 9 03/10/2025    ANIONGAP 8 07/08/2021     (H) 03/10/2025     (H) 07/08/2021    BUN 17.9 03/10/2025    BUN 21 07/08/2021    CR 0.86 03/10/2025    CR 0.97 07/08/2021    GFRESTIMATED 90 03/10/2025    GFRESTIMATED 78 07/08/2021    GFRESTBLACK >90 07/08/2021    STEPHEN 9.0 03/10/2025    STEPHEN 9.1 07/08/2021      Lab Results   Component Value Date    AST 22 04/22/2024    AST 32 02/08/2010    ALT 18 04/22/2024    ALT 18 02/08/2010       No results found for: \"A1C\"    Lab Results   Component Value Date    INR 1.27 (H) 09/12/2024            Problem List     Patient Active Problem List   Diagnosis     Hyperlipidemia LDL goal <130     External hemorrhoids     Varicose veins of both lower extremities     Acute reaction to stress     Left inguinal hernia     Paroxysmal atrial fibrillation (H)     PAF (paroxysmal atrial fibrillation) (H)            Medications     Current Outpatient Medications   Medication Sig Dispense Refill     amiodarone (PACERONE) 200 MG tablet Take 0.5 tablets (100 mg) by mouth daily. Take 300mg twice daily for 2 weeks, then 200mg daily       amLODIPine (NORVASC) 5 MG tablet Take 1 tablet (5 mg) by mouth daily. 90 tablet 3     apixaban ANTICOAGULANT (ELIQUIS ANTICOAGULANT) 5 MG tablet Take 1 tablet (5 mg) by mouth 2 times daily. 180 tablet 4     Calcium-Magnesium-Zinc 333-133-5 MG TABS per tablet Take 1 tablet by mouth daily.       Cholecalciferol (VITAMIN D) 1000 UNIT capsule Take 1 capsule by mouth daily. 100 capsule 12     clobetasol (TEMOVATE) 0.05 % external solution Apply topically twice a week. Apply to affected areas of scalp       clonazePAM (KLONOPIN) 0.5 MG tablet TAKE ONE TABLET BY MOUTH NIGHTLY AS NEEDED FOR REM BEHAVIOR DISORDER 30 tablet 5     FISH OIL 1200 MG OR CAPS Take 1,200 mg by mouth daily.       GLUCOSAMINE 500 MG OR CAPS Take 500 mg by mouth daily.  0     ketoconazole (NIZORAL) 2 % " external shampoo Apply topically 2 times daily. Wash affected areas on face, scalp & beard. Lather & let sit 5 minutes before rinsing.       melatonin 3-10 MG TABS Take by mouth at bedtime.       metroNIDAZOLE (METROCREAM) 0.75 % external cream Apply topically 2 times daily.       rosuvastatin (CRESTOR) 5 MG tablet TAKE ONE TABLET BY MOUTH ONCE DAILY 90 tablet 0     sildenafil (VIAGRA) 100 MG tablet TAKE ONE TABLET BY MOUTH DAILY AS NEEDED FOR ERECTILE DYSFUNCTION. DO NOT TAKE WITH NITRO, TERAZOSIN, OR DOXAZOSIN 30 tablet 2            Past Medical History     Past Medical History:   Diagnosis Date     Aortic regurgitation     moderate     New onset atrial fibrillation (H)      Varicose veins of lower extremities with inflammation      Past Surgical History:   Procedure Laterality Date     ABDOMEN SURGERY  7/27/2020     ANESTHESIA CARDIOVERSION N/A 9/12/2024    Procedure: Anesthesia cardioversion;  Surgeon: GENERIC ANESTHESIA PROVIDER;  Location:  OR     APPENDECTOMY  Age 20     DAVINCI HERNIORRHAPHY INGUINAL Left 08/18/2020    Procedure: ROBOTIC ASSISTED LEFT INGUINAL HERNIA REPAIR WITH MESH;  Surgeon: Keshav Mehta MD;  Location: [facility] OR     EP ABLATION FOCAL AFIB N/A 3/10/2025    Procedure: Ablation Atrial Fibrilation;  Surgeon: Fred Gutiérrez MD;  Location:  HEART CARDIAC CATH LAB     LIGATE VEIN      Varicose veins, several times     TONSILLECTOMY & ADENOIDECTOMY  Approx age 10     [facility] LAP,HERNIA REPAIR PROC,UNLIST  2002     [facility] NONSPECIFIC PROCEDURE  2001    vein stripping     Family History   Problem Relation Age of Onset     Eye Disorder Mother         glaucoma     Diabetes Mother      Cancer Sister         breast Ca, diagnosed age 49     Cardiovascular Father         Abdominal aortic aneurysm     Obesity Father      Hypertension Brother      C.A.D. No family hx of      Cancer - colorectal No family hx of      Prostate Cancer No family hx of      Social History     Socioeconomic History     Marital  status:      Spouse name: Mary     Number of children: 2     Years of education: Not on file     Highest education level: Not on file   Occupational History     Employer: RETIRED     Comment: Worked as a pharmacist   Tobacco Use     Smoking status: Never     Passive exposure: Never     Smokeless tobacco: Never   Vaping Use     Vaping status: Never Used   Substance and Sexual Activity     Alcohol use: Yes     Comment: 1 beer weekly     Drug use: No     Sexual activity: Yes     Partners: Female     Birth control/protection: None   Other Topics Concern      Service Not Asked     Blood Transfusions Not Asked     Caffeine Concern Not Asked     Occupational Exposure Not Asked     Hobby Hazards Not Asked     Sleep Concern Not Asked     Stress Concern Not Asked     Weight Concern Not Asked     Special Diet Not Asked     Back Care No     Exercise Yes     Comment: daily walk and ride bike     Bike Helmet Yes     Seat Belt Yes     Self-Exams Not Asked     Parent/sibling w/ CABG, MI or angioplasty before 65F 55M? No   Social History Narrative     Not on file     Social Drivers of Health     Financial Resource Strain: Low Risk  (4/19/2024)    Financial Resource Strain      Within the past 12 months, have you or your family members you live with been unable to get utilities (heat, electricity) when it was really needed?: No   Food Insecurity: Low Risk  (4/19/2024)    Food Insecurity      Within the past 12 months, did you worry that your food would run out before you got money to buy more?: No      Within the past 12 months, did the food you bought just not last and you didn t have money to get more?: No   Transportation Needs: Low Risk  (4/19/2024)    Transportation Needs      Within the past 12 months, has lack of transportation kept you from medical appointments, getting your medicines, non-medical meetings or appointments, work, or from getting things that you need?: No   Physical Activity: Sufficiently  Active (4/19/2024)    Exercise Vital Sign      Days of Exercise per Week: 7 days      Minutes of Exercise per Session: 130 min   Stress: No Stress Concern Present (4/19/2024)    [employer] - Occupational Stress Questionnaire      Feeling of Stress : Not at all   Social Connections: Unknown (4/19/2024)    Social Connection and Isolation Panel [NHANES]      Frequency of Communication with Friends and Family: Not on file      Frequency of Social Gatherings with Friends and Family: More than three times a week      Attends Scientologist Services: Not on file      Active Member of Clubs or Organizations: Not on file      Attends Club or Organization Meetings: Not on file      Marital Status: Not on file   Interpersonal Safety: Low Risk  (3/10/2025)    Interpersonal Safety      Do you feel physically and emotionally safe where you currently live?: Yes      Within the past 12 months, have you been hit, slapped, kicked or otherwise physically hurt by someone?: No      Within the past 12 months, have you been humiliated or emotionally abused in other ways by your partner or ex-partner?: No   Housing Stability: Low Risk  (4/19/2024)    Housing Stability      Do you have housing? : Yes      Are you worried about losing your housing?: No            Allergies   No known drug allergy    Today's clinic visit entailed:  The following tests were independently interpreted by me as noted in my documentation: ecg  30 minutes spent by me on the date of the encounter doing chart review, history and exam, documentation and further activities per the note  Provider  Link to [facility] Help Grid     The level of medical decision making during this visit was of moderate complexity.     Thank you for allowing me to participate in the care of your patient.      Sincerely,     Fred Rowe MD     [facility]  cc:   No referring provider defined for this  encounter.

## 2025-06-12 NOTE — PROGRESS NOTES
"  Electrophysiology Clinic Progress Note    Keith Kevin MRN# [id number]   YOB: 1950 Age: 74 year old     Primary cardiologist:          Assessment and Plan   Delightful 73 yo pt noted to be in AF during pre-colonoscopy eval in 2021. Saw my parter, Dr. Dubon at that time whom recommended Flecainide soon thereafter as pt did have AF recurrence with symptoms. He has been doing well without AF. He was noted to be in typical AFL in  October of '23  as documented on ECG. Subsequent nuc stress shows rate related LBBB with ?perfusion defect. Flecainide was discontinued.   I saw him in 3/24 to discuss options.  As his AF sxs were not that severe pt opted for rate control . Should sxs worsen in the future rhythm control may be considered with amio or ablation.     Since last visit has been in persistent AF with fatigue and malaise. He had ERAF after last cardioversion. Pt had thought about rhythm control options and decided for ablation which was performed in 3/10/25 with isolation of PVs and posterior LA with PFA. Had AF a week later requiring cardioversion. Amio was started. Since then has had a few more AF lasting for <1 hour.     Doing well otherwise, quite active w/o sob, or palpitations. ECG today shows sinus     Too early to gauge long term success. Will reduce amio to 100 mg daily for now and may consider stopping in a year if no AF. Check tsh/alt in 3 months.       The longitudinal plan of care of the diagnosis(es)/condition (s) as documented were addressed during this visit. Due to the added complexity in care, I will continue to support the patient in the subsequent management and with ongoing continuity of care.       Review of Systems     12-pt ROS is negative except for as noted in the HPI.          Physical Exam     Vitals: /59   Pulse 59   Resp 18   Ht 1.778 m (5' 10\")   SpO2 98%   BMI 26.86 kg/m    Wt Readings from Last 10 Encounters:   03/19/25 84.9 kg (187 lb 3.2 oz)   03/10/25 " 83.9 kg (185 lb)   02/07/25 83.9 kg (185 lb)   10/23/24 81.6 kg (180 lb)   10/09/24 83.9 kg (185 lb)   09/13/24 80.5 kg (177 lb 6.4 oz)   07/09/24 82.7 kg (182 lb 6.4 oz)   04/22/24 82.9 kg (182 lb 11.2 oz)   03/06/24 83.9 kg (185 lb)   11/29/23 83.9 kg (185 lb)       Constitutional:  Patient is pleasant, alert, cooperative, and in NAD.  HEENT:  NCAT. PERRLA. EOM's intact.   Neck:  CVP appears normal. No carotid bruits.   Pulmonary: Normal respiratory effort. CTAB.   Cardiac: RRR, normal S1/S2, no S3/S4, no murmur or rub.   Abdomen:  Non-tender abdomen, no hepatosplenomegaly appreciated.   Vascular: Pulses in the upper and lower extremities are 2+ and equal bilaterally.  Extremities: No edema, erythema, cyanosis or tenderness appreciated.  Skin:  No rashes or lesions appreciated.   Neurological:  No gross motor or sensory deficits.   Psych: Appropriate affect.          Data   Labs reviewed:  Recent Labs   Lab Test 04/22/24  0844 06/16/23  0917 04/19/23  1002 07/08/21  1402   LDL 83 82 157*  --    HDL 62 61 62  --    NHDL 96 93 170*  --    CHOL 158 154 232*  --    TRIG 67 54 67  --    TSH  --   --  1.45 1.04       Lab Results   Component Value Date    WBC 5.6 03/10/2025    WBC 7.1 07/08/2021    RBC 4.68 03/10/2025    RBC 4.80 07/08/2021    HGB 15.1 03/10/2025    HGB 15.7 07/08/2021    HCT 44.2 03/10/2025    HCT 46.0 07/08/2021    MCV 94 03/10/2025    MCV 96 07/08/2021    MCH 32.3 03/10/2025    MCH 32.7 07/08/2021    MCHC 34.2 03/10/2025    MCHC 34.1 07/08/2021    RDW 12.9 03/10/2025    RDW 12.9 07/08/2021     03/10/2025     07/08/2021       Lab Results   Component Value Date     03/10/2025     07/08/2021    POTASSIUM 4.1 03/10/2025    POTASSIUM 4.0 07/08/2021    CHLORIDE 108 (H) 03/10/2025    CHLORIDE 108 07/08/2021    CO2 25 03/10/2025    CO2 22 07/08/2021    ANIONGAP 9 03/10/2025    ANIONGAP 8 07/08/2021     (H) 03/10/2025     (H) 07/08/2021    BUN 17.9 03/10/2025    BUN 21  "07/08/2021    CR 0.86 03/10/2025    CR 0.97 07/08/2021    GFRESTIMATED 90 03/10/2025    GFRESTIMATED 78 07/08/2021    GFRESTBLACK >90 07/08/2021    STEPHEN 9.0 03/10/2025    STEPHEN 9.1 07/08/2021      Lab Results   Component Value Date    AST 22 04/22/2024    AST 32 02/08/2010    ALT 18 04/22/2024    ALT 18 02/08/2010       No results found for: \"A1C\"    Lab Results   Component Value Date    INR 1.27 (H) 09/12/2024            Problem List     Patient Active Problem List   Diagnosis    Hyperlipidemia LDL goal <130    External hemorrhoids    Varicose veins of both lower extremities    Acute reaction to stress    Left inguinal hernia    Paroxysmal atrial fibrillation (H)    PAF (paroxysmal atrial fibrillation) (H)            Medications     Current Outpatient Medications   Medication Sig Dispense Refill    amiodarone (PACERONE) 200 MG tablet Take 0.5 tablets (100 mg) by mouth daily. Take 300mg twice daily for 2 weeks, then 200mg daily      amLODIPine (NORVASC) 5 MG tablet Take 1 tablet (5 mg) by mouth daily. 90 tablet 3    apixaban ANTICOAGULANT (ELIQUIS ANTICOAGULANT) 5 MG tablet Take 1 tablet (5 mg) by mouth 2 times daily. 180 tablet 4    Calcium-Magnesium-Zinc 333-133-5 MG TABS per tablet Take 1 tablet by mouth daily.      Cholecalciferol (VITAMIN D) 1000 UNIT capsule Take 1 capsule by mouth daily. 100 capsule 12    clobetasol (TEMOVATE) 0.05 % external solution Apply topically twice a week. Apply to affected areas of scalp      clonazePAM (KLONOPIN) 0.5 MG tablet TAKE ONE TABLET BY MOUTH NIGHTLY AS NEEDED FOR REM BEHAVIOR DISORDER 30 tablet 5    FISH OIL 1200 MG OR CAPS Take 1,200 mg by mouth daily.      GLUCOSAMINE 500 MG OR CAPS Take 500 mg by mouth daily.  0    ketoconazole (NIZORAL) 2 % external shampoo Apply topically 2 times daily. Wash affected areas on face, scalp & beard. Lather & let sit 5 minutes before rinsing.      melatonin 3-10 MG TABS Take by mouth at bedtime.      metroNIDAZOLE (METROCREAM) 0.75 % " external cream Apply topically 2 times daily.      rosuvastatin (CRESTOR) 5 MG tablet TAKE ONE TABLET BY MOUTH ONCE DAILY 90 tablet 0    sildenafil (VIAGRA) 100 MG tablet TAKE ONE TABLET BY MOUTH DAILY AS NEEDED FOR ERECTILE DYSFUNCTION. DO NOT TAKE WITH NITRO, TERAZOSIN, OR DOXAZOSIN 30 tablet 2            Past Medical History     Past Medical History:   Diagnosis Date    Aortic regurgitation     moderate    New onset atrial fibrillation (H)     Varicose veins of lower extremities with inflammation      Past Surgical History:   Procedure Laterality Date    ABDOMEN SURGERY  7/27/2020    ANESTHESIA CARDIOVERSION N/A 9/12/2024    Procedure: Anesthesia cardioversion;  Surgeon: GENERIC ANESTHESIA PROVIDER;  Location: [facility] OR    APPENDECTOMY  Age 20    DAVINCI HERNIORRHAPHY INGUINAL Left 08/18/2020    Procedure: ROBOTIC ASSISTED LEFT INGUINAL HERNIA REPAIR WITH MESH;  Surgeon: Keshav Mehta MD;  Location: [facility] OR    EP ABLATION FOCAL AFIB N/A 3/10/2025    Procedure: Ablation Atrial Fibrilation;  Surgeon: Fred Gutiérrez MD;  Location:  HEART CARDIAC CATH LAB    LIGATE VEIN      Varicose veins, several times    TONSILLECTOMY & ADENOIDECTOMY  Approx age 10    [facility] LAP,HERNIA REPAIR PROC,UNLIST  2002    [facility] NONSPECIFIC PROCEDURE  2001    vein stripping     Family History   Problem Relation Age of Onset    Eye Disorder Mother         glaucoma    Diabetes Mother     Cancer Sister         breast Ca, diagnosed age 49    Cardiovascular Father         Abdominal aortic aneurysm    Obesity Father     Hypertension Brother     C.A.D. No family hx of     Cancer - colorectal No family hx of     Prostate Cancer No family hx of      Social History     Socioeconomic History    Marital status:      Spouse name: Mary    Number of children: 2    Years of education: Not on file    Highest education level: Not on file   Occupational History     Employer: RETIRED     Comment: Worked as a pharmacist   Tobacco Use    Smoking status:  Never     Passive exposure: Never    Smokeless tobacco: Never   Vaping Use    Vaping status: Never Used   Substance and Sexual Activity    Alcohol use: Yes     Comment: 1 beer weekly    Drug use: No    Sexual activity: Yes     Partners: Female     Birth control/protection: None   Other Topics Concern     Service Not Asked    Blood Transfusions Not Asked    Caffeine Concern Not Asked    Occupational Exposure Not Asked    Hobby Hazards Not Asked    Sleep Concern Not Asked    Stress Concern Not Asked    Weight Concern Not Asked    Special Diet Not Asked    Back Care No    Exercise Yes     Comment: daily walk and ride bike    Bike Helmet Yes    Seat Belt Yes    Self-Exams Not Asked    Parent/sibling w/ CABG, MI or angioplasty before 65F 55M? No   Social History Narrative    Not on file     Social Drivers of Health     Financial Resource Strain: Low Risk  (4/19/2024)    Financial Resource Strain     Within the past 12 months, have you or your family members you live with been unable to get utilities (heat, electricity) when it was really needed?: No   Food Insecurity: Low Risk  (4/19/2024)    Food Insecurity     Within the past 12 months, did you worry that your food would run out before you got money to buy more?: No     Within the past 12 months, did the food you bought just not last and you didn t have money to get more?: No   Transportation Needs: Low Risk  (4/19/2024)    Transportation Needs     Within the past 12 months, has lack of transportation kept you from medical appointments, getting your medicines, non-medical meetings or appointments, work, or from getting things that you need?: No   Physical Activity: Sufficiently Active (4/19/2024)    Exercise Vital Sign     Days of Exercise per Week: 7 days     Minutes of Exercise per Session: 130 min   Stress: No Stress Concern Present (4/19/2024)    [employer] [place] [employer] - Occupational Stress Questionnaire     Feeling of Stress : Not at all    Social Connections: Unknown (4/19/2024)    Social Connection and Isolation Panel [NHANES]     Frequency of Communication with Friends and Family: Not on file     Frequency of Social Gatherings with Friends and Family: More than three times a week     Attends Lutheran Services: Not on file     Active Member of Clubs or Organizations: Not on file     Attends Club or Organization Meetings: Not on file     Marital Status: Not on file   Interpersonal Safety: Low Risk  (3/10/2025)    Interpersonal Safety     Do you feel physically and emotionally safe where you currently live?: Yes     Within the past 12 months, have you been hit, slapped, kicked or otherwise physically hurt by someone?: No     Within the past 12 months, have you been humiliated or emotionally abused in other ways by your partner or ex-partner?: No   Housing Stability: Low Risk  (4/19/2024)    Housing Stability     Do you have housing? : Yes     Are you worried about losing your housing?: No            Allergies   No known drug allergy    Today's clinic visit entailed:  The following tests were independently interpreted by me as noted in my documentation: ecg  30 minutes spent by me on the date of the encounter doing chart review, history and exam, documentation and further activities per the note  Provider  Link to MDM Help Grid     The level of medical decision making during this visit was of moderate complexity.

## 2025-06-18 ENCOUNTER — OFFICE VISIT (OUTPATIENT)
Dept: PEDIATRICS | Facility: CLINIC | Age: 75
End: 2025-06-18
Payer: COMMERCIAL

## 2025-06-18 VITALS
RESPIRATION RATE: 16 BRPM | HEART RATE: 51 BPM | DIASTOLIC BLOOD PRESSURE: 67 MMHG | OXYGEN SATURATION: 97 % | SYSTOLIC BLOOD PRESSURE: 136 MMHG | WEIGHT: 186.3 LBS | BODY MASS INDEX: 26.67 KG/M2 | TEMPERATURE: 97.1 F | HEIGHT: 70 IN

## 2025-06-18 DIAGNOSIS — Z00.00 ENCOUNTER FOR MEDICARE ANNUAL WELLNESS EXAM: Primary | ICD-10-CM

## 2025-06-18 DIAGNOSIS — I48.0 PAROXYSMAL ATRIAL FIBRILLATION (H): ICD-10-CM

## 2025-06-18 DIAGNOSIS — E78.5 HYPERLIPIDEMIA LDL GOAL <130: ICD-10-CM

## 2025-06-18 PROCEDURE — 3075F SYST BP GE 130 - 139MM HG: CPT | Performed by: INTERNAL MEDICINE

## 2025-06-18 PROCEDURE — 3078F DIAST BP <80 MM HG: CPT | Performed by: INTERNAL MEDICINE

## 2025-06-18 PROCEDURE — G0439 PPPS, SUBSEQ VISIT: HCPCS | Performed by: INTERNAL MEDICINE

## 2025-06-18 PROCEDURE — 1126F AMNT PAIN NOTED NONE PRSNT: CPT | Performed by: INTERNAL MEDICINE

## 2025-06-18 SDOH — HEALTH STABILITY: PHYSICAL HEALTH: ON AVERAGE, HOW MANY DAYS PER WEEK DO YOU ENGAGE IN MODERATE TO STRENUOUS EXERCISE (LIKE A BRISK WALK)?: 7 DAYS

## 2025-06-18 ASSESSMENT — PAIN SCALES - GENERAL: PAINLEVEL_OUTOF10: NO PAIN (0)

## 2025-06-18 ASSESSMENT — SOCIAL DETERMINANTS OF HEALTH (SDOH): HOW OFTEN DO YOU GET TOGETHER WITH FRIENDS OR RELATIVES?: THREE TIMES A WEEK

## 2025-06-18 NOTE — PROGRESS NOTES
"Preventive Care Visit  [facility]  Abram Villanueva MD, Internal Medicine - Pediatrics  Jun 18, 2025      Assessment & Plan       ICD-10-CM    1. Encounter for Medicare annual wellness exam  Z00.00       2. Hyperlipidemia LDL goal <130  E78.5 Lipid panel reflex to direct LDL Fasting      3. Paroxysmal atrial fibrillation (H)  I48.0         Here for AWV. Overall is feeling well. HM reviewed. Is not fasting today, will defer labwork.    PAF, hyperlipidemia. Followed by cardiology. Future order for lipids signed. No med changes made today.       BMI  Estimated body mass index is 26.73 kg/m  as calculated from the following:    Height as of this encounter: 1.778 m (5' 10\").    Weight as of this encounter: 84.5 kg (186 lb 4.8 oz).       Counseling  Appropriate preventive services were addressed with this patient via screening, questionnaire, or discussion as appropriate for fall prevention, nutrition, physical activity, Tobacco-use cessation, social engagement, weight loss and cognition.  Checklist reviewing preventive services available has been given to the patient.  Reviewed patient's diet, addressing concerns and/or questions.   Patient reported safety concerns were addressed today.The patient was provided with written information regarding signs of hearing loss.         Abram Villanueva MD      Sarahi Chua is a 74 year old, presenting for the following:  Wellness Visit        6/18/2025     8:18 AM   Additional Questions   Roomed by Kamilla Connors          [facility]  Here for AWV. Overall is feeling well. No acute concerns.     Atrial fibrillation history as well as elevated lipids. Primarily managed by cardiology. No active cardiac symptoms at this time.   Is currently treated with low dose amiodarone which feels to be helping him remain in sinus.     Advance Care Planning    Document on file is a Health Care Directive or POLST.        6/18/2025   General Health   How would you rate your overall physical " health? Good   Feel stress (tense, anxious, or unable to sleep) Only a little   (!) STRESS CONCERN      6/18/2025   Nutrition   Diet: Regular (no restrictions)         6/18/2025   Exercise   Days per week of moderate/strenous exercise 7 days         6/18/2025   Social Factors   Frequency of gathering with friends or relatives Three times a week   Worry food won't last until get money to buy more No   Food not last or not have enough money for food? No   Do you have housing? (Housing is defined as stable permanent housing and does not include staying outside in a car, in a tent, in an abandoned building, in an overnight shelter, or couch-surfing.) Yes   Are you worried about losing your housing? No   Lack of transportation? No   Unable to get utilities (heat,electricity)? No         6/18/2025   Fall Risk   Fallen 2 or more times in the past year? No   Trouble with walking or balance? No          6/18/2025   Activities of Daily Living- Home Safety   Needs help with the following daily activites None of the above   Safety concerns in the home Throw rugs in the hallway    No grab bars in the bathroom       Multiple values from one day are sorted in reverse-chronological order         6/18/2025   Dental   Dentist two times every year? Yes         6/18/2025   Hearing Screening   Hearing concerns? (!) I NEED TO ASK PEOPLE TO SPEAK UP OR REPEAT THEMSELVES.    (!) IT'S HARDER TO UNDERSTAND WOMEN'S VOICES THAN MEN'S VOICES.    (!) IT'S HARD TO FOLLOW A CONVERSATION IN A NOISY RESTAURANT OR CROWDED ROOM.    (!) TROUBLE UNDESTANDING A SPEAKER IN A PUBLIC MEETING OR [employer] SERVICE.    (!) TROUBLE UNDERSTANDING SOFT OR WHISPERED SPEECH.   Would you like a referral for hearing testing? I already have hearing aids       Multiple values from one day are sorted in reverse-chronological order         6/18/2025   Driving Risk Screening   Patient/family members have concerns about driving No         6/18/2025   General  Alertness/Fatigue Screening   Have you been more tired than usual lately? No         6/18/2025   Urinary Incontinence Screening   Bothered by leaking urine in past 6 months No         Today's PHQ-2 Score:       6/18/2025     8:11 AM   PHQ-2 ( 1999 [employer])   Q1: Little interest or pleasure in doing things 0   Q2: Feeling down, depressed or hopeless 0   PHQ-2 Score 0    Q1: Little interest or pleasure in doing things Not at all   Q2: Feeling down, depressed or hopeless Not at all   PHQ-2 Score 0       Patient-reported           6/18/2025   Substance Use   Alcohol more than 3/day or more than 7/wk No   Do you have a current opioid prescription? No   How severe/bad is pain from 1 to 10? 0/10 (No Pain)   Do you use any other substances recreationally? No     Social History     Tobacco Use    Smoking status: Never     Passive exposure: Never    Smokeless tobacco: Never   Vaping Use    Vaping status: Never Used   Substance Use Topics    Alcohol use: Yes     Comment: 1 beer weekly    Drug use: No           6/18/2025   AAA Screening   Family history of Abdominal Aortic Aneurysm (AAA)? (!) YES    ASCVD Risk   The 10-year ASCVD risk score (Jean Paul ACEVEDO, et al., 2019) is: 23.7%    Values used to calculate the score:      Age: 74 years      Sex: Male      Is Non- : No      Diabetic: No      Tobacco smoker: No      Systolic Blood Pressure: 136 mmHg      Is BP treated: No      HDL Cholesterol: 62 mg/dL      Total Cholesterol: 158 mg/dL      Current providers sharing in care for this patient include:  Patient Care Team:  Abram Villanueva MD as PCP - General  Abram Villanueva MD as Assigned PCP  Elise Rao PA-C as Physician Assistant (Cardiology)  Fred Gutiérrez MD as MD (Cardiovascular Disease)  Renzo Corcoran MD as Assigned Sleep Provider  Fred Gutiérrez MD as Assigned Heart and Vascular Provider    The following health maintenance items are reviewed in Epic and correct as of  "today:  Health Maintenance   Topic Date Due    ANNUAL REVIEW OF HM ORDERS  Never done    RSV VACCINE (1 - 1-dose 75+ series) Never done    COVID-19 VACCINE (10 - 2024-25 season) 03/03/2025    MEDICARE ANNUAL WELLNESS VISIT  04/22/2025    FALL RISK ASSESSMENT  06/18/2026    DTAP/TDAP/TD VACCINE (4 - Td or Tdap) 02/07/2028    DIABETES SCREENING  03/10/2028    ADVANCE CARE PLANNING  04/25/2028    COLORECTAL CANCER SCREENING  04/29/2028    LIPID  04/22/2029    HEPATITIS C SCREENING  Completed    PHQ-2 (once per calendar year)  Completed    INFLUENZA VACCINE  Completed    PNEUMOCOCCAL VACCINE 50+ YEARS  Completed    ZOSTER VACCINE  Completed    HPV VACCINE  Aged Out    MENINGITIS VACCINE  Aged Out            Objective    Exam  /67 (BP Location: Right arm, Patient Position: Sitting, Cuff Size: Adult Regular)   Pulse 51   Temp 97.1  F (36.2  C) (Temporal)   Resp 16   Ht 1.778 m (5' 10\")   Wt 84.5 kg (186 lb 4.8 oz)   SpO2 97%   BMI 26.73 kg/m     Estimated body mass index is 26.73 kg/m  as calculated from the following:    Height as of this encounter: 1.778 m (5' 10\").    Weight as of this encounter: 84.5 kg (186 lb 4.8 oz).    Physical Exam  GENERAL: healthy, alert and no distress  EYES: PERRL, EOMI  HENT: ear canals and TM's normal. No nasal discharge. OP moist.  NECK: no adenopathy  RESP: lungs clear to auscultation - no rales, rhonchi or wheezes  CV: regular rate and rhythm, normal S1 S2, no murmur, no peripheral edema  ABDOMEN: soft, nontender, bowel sounds normal  MS: no gross musculoskeletal defects noted  SKIN: no suspicious lesions or rashes  NEURO: Normal strength and tone  PSYCH: mentation appears normal, affect normal          6/18/2025   Mini Cog   Clock Draw Score 2 Normal   3 Item Recall 2 objects recalled   Mini Cog Total Score 4              Signed Electronically by: Abram Villanueva MD    "

## 2025-06-21 ENCOUNTER — MYC REFILL (OUTPATIENT)
Dept: CARDIOLOGY | Facility: CLINIC | Age: 75
End: 2025-06-21
Payer: COMMERCIAL

## 2025-06-21 DIAGNOSIS — I48.0 PAROXYSMAL ATRIAL FIBRILLATION (H): ICD-10-CM

## 2025-06-22 NOTE — PATIENT INSTRUCTIONS
Patient Education   Preventive Care Advice   This is general advice given by our system to help you stay healthy. However, your care team may have specific advice just for you. Please talk to your care team about your preventive care needs.  Nutrition  Eat 5 or more servings of fruits and vegetables each day.  Try wheat bread, brown rice and whole grain pasta (instead of white bread, rice, and pasta).  Get enough calcium and vitamin D. Check the label on foods and aim for 100% of the RDA (recommended daily allowance).  Lifestyle  Exercise at least 150 minutes each week  (30 minutes a day, 5 days a week).  Do muscle strengthening activities 2 days a week. These help control your weight and prevent disease.  No smoking.  Wear sunscreen to prevent skin cancer.  Have a dental exam and cleaning every 6 months.  Yearly exams  See your health care team every year to talk about:  Any changes in your health.  Any medicines your care team has prescribed.  Preventive care, family planning, and ways to prevent chronic diseases.  Shots (vaccines)   HPV shots (up to age 26), if you've never had them before.  Hepatitis B shots (up to age 59), if you've never had them before.  COVID-19 shot: Get this shot when it's due.  Flu shot: Get a flu shot every year.  Tetanus shot: Get a tetanus shot every 10 years.  Pneumococcal, hepatitis A, and RSV shots: Ask your care team if you need these based on your risk.  Shingles shot (for age 50 and up)  General health tests  Diabetes screening:  Starting at age 35, Get screened for diabetes at least every 3 years.  If you are younger than age 35, ask your care team if you should be screened for diabetes.  Cholesterol test: At age 39, start having a cholesterol test every 5 years, or more often if advised.  Bone density scan (DEXA): At age 50, ask your care team if you should have this scan for osteoporosis (brittle bones).  Hepatitis C: Get tested at least once in your life.  STIs (sexually  transmitted infections)  Before age 24: Ask your care team if you should be screened for STIs.  After age 24: Get screened for STIs if you're at risk. You are at risk for STIs (including HIV) if:  You are sexually active with more than one person.  You don't use condoms every time.  You or a partner was diagnosed with a sexually transmitted infection.  If you are at risk for HIV, ask about PrEP medicine to prevent HIV.  Get tested for HIV at least once in your life, whether you are at risk for HIV or not.  Cancer screening tests  Cervical cancer screening: If you have a cervix, begin getting regular cervical cancer screening tests starting at age 21.  Breast cancer scan (mammogram): If you've ever had breasts, begin having regular mammograms starting at age 40. This is a scan to check for breast cancer.  Colon cancer screening: It is important to start screening for colon cancer at age 45.  Have a colonoscopy test every 10 years (or more often if you're at risk) Or, ask your provider about stool tests like a FIT test every year or Cologuard test every 3 years.  To learn more about your testing options, visit:   .  For help making a decision, visit:   https://bit.ly/gg12670.  Prostate cancer screening test: If you have a prostate, ask your care team if a prostate cancer screening test (PSA) at age 55 is right for you.  Lung cancer screening: If you are a current or former smoker ages 50 to 80, ask your care team if ongoing lung cancer screenings are right for you.  For informational purposes only. Not to replace the advice of your health care provider. Copyright   2023 [facility]. All rights reserved. Clinically reviewed by the [facility] Transitions Program. [employer] [id number] - REV 01/24.  Learning About Activities of Daily Living  What are activities of daily living?     Activities of daily living (ADLs) are the basic self-care tasks you do every day. These include eating, bathing, dressing,  and moving around.  As you age, and if you have health problems, you may find that it's harder to do some of these tasks. If so, your doctor can suggest ideas that may help.  To measure what kind of help you may need, your doctor will ask how well you are able to do ADLs. Let your doctor know if there are any tasks that you are having trouble doing. This is an important first step to getting help. And when you have the help you need, you can stay as independent as possible.  How will a doctor assess your ADLs?  Asking about ADLs is part of a routine health checkup your doctor will likely do as you age. Your health check might be done in a doctor's office, in your home, or at a hospital. The goal is to find out if you are having any problems that could make it hard to care for yourself or that make it unsafe for you to be on your own.  To measure your ADLs, your doctor will ask how hard it is for you to do routine tasks. Your doctor may also want to know if you have changed the way you do a task because of a health problem. Your doctor may watch how you:  Walk back and forth.  Keep your balance while you stand or walk.  Move from sitting to standing or from a bed to a chair.  Button or unbutton a shirt or sweater.  Remove and put on your shoes.  It's common to feel a little worried or anxious if you find you can't do all the things you used to be able to do. Talking with your doctor about ADLs is a way to make sure you're as safe as possible and able to care for yourself as well as you can. You may want to bring a caregiver, friend, or family member to your checkup. They can help you talk to your doctor.  Follow-up care is a key part of your treatment and safety. Be sure to make and go to all appointments, and call your doctor if you are having problems. It's also a good idea to know your test results and keep a list of the medicines you take.  Current as of: October 24, 2024  Content Version: 14.5    3054-9916  [employer].   Care instructions adapted under license by your healthcare professional. If you have questions about a medical condition or this instruction, always ask your healthcare professional. [employer] disclaims any warranty or liability for your use of this information.    Hearing Loss: Care Instructions  Overview     Hearing loss is a sudden or slow decrease in how well you hear. It can range from slight to profound. Permanent hearing loss can occur with aging. It also can happen when you are exposed long-term to loud noise. Examples include listening to loud music, riding motorcycles, or being around other loud machines.  Hearing loss can affect your work and home life. It can make you feel lonely or depressed. You may feel that you have lost your independence. But hearing aids and other devices can help you hear better and feel connected to others.  Follow-up care is a key part of your treatment and safety. Be sure to make and go to all appointments, and call your doctor if you are having problems. It's also a good idea to know your test results and keep a list of the medicines you take.  How can you care for yourself at home?  Avoid loud noises whenever possible. This helps keep your hearing from getting worse.  Always wear hearing protection around loud noises.  Wear a hearing aid as directed.  A professional can help you pick a hearing aid that will work best for you.  You can also get hearing aids over the counter for mild to moderate hearing loss.  Have hearing tests as your doctor suggests. They can show whether your hearing has changed. Your hearing aid may need to be adjusted.  Use other devices as needed. These may include:  Telephone amplifiers and hearing aids that can connect to a television, stereo, radio, or microphone.  Devices that use lights or vibrations. These alert you to the doorbell, a ringing telephone, or a baby monitor.  Television closed-captioning. This  "shows the words at the bottom of the screen. Most new TVs can do this.  TTY (text telephone). This lets you type messages back and forth on the telephone instead of talking or listening. These devices are also called TDD. When messages are typed on the keyboard, they are sent over the phone line to a receiving TTY. The message is shown on a monitor.  Use text messaging, social media, and email if it is hard for you to communicate by telephone.  Try to learn a listening technique called speechreading. It is not lipreading. You pay attention to people's gestures, expressions, posture, and tone of voice. These clues can help you understand what a person is saying. Face the person you are talking to, and have them face you. Make sure the lighting is good. You need to see the other person's face clearly.  Think about counseling if you need help to adjust to your hearing loss.  When should you call for help?  Watch closely for changes in your health, and be sure to contact your doctor if:    You think your hearing is getting worse.     You have new symptoms, such as dizziness or nausea.   Where can you learn more?  Go to https://www.[employer].net/patiented  Enter R798 in the search box to learn more about \"Hearing Loss: Care Instructions.\"  Current as of: October 27, 2024  Content Version: 14.5 2024-2025 [employer].   Care instructions adapted under license by your healthcare professional. If you have questions about a medical condition or this instruction, always ask your healthcare professional. [employer] disclaims any warranty or liability for your use of this information.       "

## 2025-06-23 RX ORDER — AMIODARONE HYDROCHLORIDE 200 MG/1
100 TABLET ORAL DAILY
Qty: 90 TABLET | Refills: 3 | Status: SHIPPED | OUTPATIENT
Start: 2025-06-23 | End: 2025-06-26

## 2025-06-26 RX ORDER — AMIODARONE HYDROCHLORIDE 200 MG/1
100 TABLET ORAL DAILY
Qty: 90 TABLET | Refills: 3 | Status: SHIPPED | OUTPATIENT
Start: 2025-06-26

## 2025-06-26 NOTE — TELEPHONE ENCOUNTER
Received call from [facility] mail order pharmacy requesting clarification on Amiodarone dose as it currently says take 100mg daily but also still includes instructions from previous higher doses. Med list updated and new script sent and pharmacy called and left VM to inform.  GENEVIEVE Eisenberg

## 2025-07-22 DIAGNOSIS — G47.52 RBD (REM BEHAVIORAL DISORDER): ICD-10-CM

## 2025-07-23 RX ORDER — CLONAZEPAM 0.5 MG/1
TABLET ORAL
Qty: 30 TABLET | Refills: 5 | Status: SHIPPED | OUTPATIENT
Start: 2025-07-23

## 2025-08-28 DIAGNOSIS — E78.5 HYPERLIPIDEMIA LDL GOAL <130: ICD-10-CM

## 2025-08-28 RX ORDER — ROSUVASTATIN CALCIUM 5 MG/1
5 TABLET, COATED ORAL DAILY
Qty: 90 TABLET | Refills: 0 | Status: SHIPPED | OUTPATIENT
Start: 2025-08-28

## (undated) DEVICE — DAVINCI HOT SHEARS TIP COVER  400180

## (undated) DEVICE — CATH SOUNDSTAR 8FRX90CM 10439011

## (undated) DEVICE — SU VICRYL 0 CT-2 CR 8X18" J727D

## (undated) DEVICE — PULSED FIELD ABLATION CATHETER, 31MM

## (undated) DEVICE — LINEN ORTHO ACL PACK 5447

## (undated) DEVICE — INTRODUCER SHEATH FAST-CATH 9FRX12CM 406116

## (undated) DEVICE — Device

## (undated) DEVICE — PACK SET-UP STD 9102

## (undated) DEVICE — DAVINCI OBTURATOR 8MM BLADELESS 420023

## (undated) DEVICE — GUIDE WIRE SHEATH VERSACROSS D1 CURVE 93CM L180 VXAK0041

## (undated) DEVICE — SOL WATER IRRIG 1000ML BOTTLE 2F7114

## (undated) DEVICE — ESU PENCIL W/HOLSTER E2350H

## (undated) DEVICE — SYSTEM CLEARIFY VISUALIZATION 21-345

## (undated) DEVICE — ESU GROUND PAD ADULT W/CORD E7507

## (undated) DEVICE — ESU ELEC BLADE 2.75" COATED/INSULATED E1455

## (undated) DEVICE — ESU CORD MONOPOLAR 10'  E0510

## (undated) DEVICE — CATH NAV PENTARAY F CURVE

## (undated) DEVICE — DEFIB PRO-PADZ LVP LQD GEL ADULT 8900-2105-01

## (undated) DEVICE — GLOVE PROTEXIS POWDER FREE 8.0 ORTHOPEDIC 2D73ET80

## (undated) DEVICE — STEERABLE SHEATH CLEAR, 13F

## (undated) DEVICE — DECANTER VIAL 2006S

## (undated) DEVICE — GLOVE PROTEXIS POWDER FREE SMT 7.5  2D72PT75X

## (undated) DEVICE — 300CM, CATHETER CONNECTION CABLE

## (undated) DEVICE — SU VICRYL 3-0 SH 27" J316H

## (undated) DEVICE — INTRODUCER SHEATH GREEN 6.5FRX11CM .038IN PSI-6F-11-038ACT

## (undated) DEVICE — SU WND CLOSURE VLOC 90 ABS 3-0 VIOLET 6" CV-23 VLOCM0804

## (undated) DEVICE — SU VICRYL 4-0 PS-2 18" UND J496H

## (undated) DEVICE — BLADE CLIPPER 3M 9670

## (undated) DEVICE — DAVINCI S CANNULA SEAL 8.5-13MM 420206

## (undated) DEVICE — LUBRICANT INST ELECTROLUBE EL101

## (undated) DEVICE — CATH EP 6FR 2MM TIP 2-8-2 115C

## (undated) DEVICE — LIGHT HANDLE X2

## (undated) DEVICE — PACK EP SRG PROC LF DISP SAN32EPFSR

## (undated) DEVICE — DAVINCI SI DRAPE ACCESSORY KIT 3-ARM 420290

## (undated) RX ORDER — GLYCOPYRROLATE 0.2 MG/ML
INJECTION INTRAMUSCULAR; INTRAVENOUS
Status: DISPENSED
Start: 2020-08-18

## (undated) RX ORDER — LIDOCAINE HYDROCHLORIDE 10 MG/ML
INJECTION, SOLUTION EPIDURAL; INFILTRATION; INTRACAUDAL; PERINEURAL
Status: DISPENSED
Start: 2020-08-18

## (undated) RX ORDER — LABETALOL 20 MG/4 ML (5 MG/ML) INTRAVENOUS SYRINGE
Status: DISPENSED
Start: 2020-08-18

## (undated) RX ORDER — PROPOFOL 10 MG/ML
INJECTION, EMULSION INTRAVENOUS
Status: DISPENSED
Start: 2020-08-18

## (undated) RX ORDER — FENTANYL CITRATE 50 UG/ML
INJECTION, SOLUTION INTRAMUSCULAR; INTRAVENOUS
Status: DISPENSED
Start: 2020-08-18

## (undated) RX ORDER — NEOSTIGMINE METHYLSULFATE 1 MG/ML
VIAL (ML) INJECTION
Status: DISPENSED
Start: 2020-08-18

## (undated) RX ORDER — OXYCODONE HYDROCHLORIDE 5 MG/1
TABLET ORAL
Status: DISPENSED
Start: 2020-08-18

## (undated) RX ORDER — HEPARIN SODIUM 1000 [USP'U]/ML
INJECTION, SOLUTION INTRAVENOUS; SUBCUTANEOUS
Status: DISPENSED
Start: 2025-03-10

## (undated) RX ORDER — ONDANSETRON 2 MG/ML
INJECTION INTRAMUSCULAR; INTRAVENOUS
Status: DISPENSED
Start: 2020-08-18

## (undated) RX ORDER — BUPIVACAINE HYDROCHLORIDE AND EPINEPHRINE 5; 5 MG/ML; UG/ML
INJECTION, SOLUTION EPIDURAL; INTRACAUDAL; PERINEURAL
Status: DISPENSED
Start: 2020-08-18

## (undated) RX ORDER — DEXAMETHASONE SODIUM PHOSPHATE 4 MG/ML
INJECTION, SOLUTION INTRA-ARTICULAR; INTRALESIONAL; INTRAMUSCULAR; INTRAVENOUS; SOFT TISSUE
Status: DISPENSED
Start: 2020-08-18

## (undated) RX ORDER — CEFAZOLIN SODIUM 2 G/100ML
INJECTION, SOLUTION INTRAVENOUS
Status: DISPENSED
Start: 2020-08-18

## (undated) RX ORDER — PROTAMINE SULFATE 10 MG/ML
INJECTION, SOLUTION INTRAVENOUS
Status: DISPENSED
Start: 2025-03-10

## (undated) RX ORDER — FENTANYL CITRATE 50 UG/ML
INJECTION, SOLUTION INTRAMUSCULAR; INTRAVENOUS
Status: DISPENSED
Start: 2025-03-10